# Patient Record
Sex: MALE | URBAN - METROPOLITAN AREA
[De-identification: names, ages, dates, MRNs, and addresses within clinical notes are randomized per-mention and may not be internally consistent; named-entity substitution may affect disease eponyms.]

---

## 2021-06-29 ENCOUNTER — APPOINTMENT (OUTPATIENT)
Dept: URBAN - METROPOLITAN AREA CLINIC 254 | Age: 71
Setting detail: DERMATOLOGY
End: 2021-06-30

## 2021-06-29 VITALS — HEIGHT: 72 IN | WEIGHT: 175 LBS

## 2021-06-29 DIAGNOSIS — D485 NEOPLASM OF UNCERTAIN BEHAVIOR OF SKIN: ICD-10-CM

## 2021-06-29 DIAGNOSIS — R21 RASH AND OTHER NONSPECIFIC SKIN ERUPTION: ICD-10-CM

## 2021-06-29 PROBLEM — D48.5 NEOPLASM OF UNCERTAIN BEHAVIOR OF SKIN: Status: ACTIVE | Noted: 2021-06-29

## 2021-06-29 PROCEDURE — OTHER COUNSELING: OTHER

## 2021-06-29 PROCEDURE — OTHER MEDICATION COUNSELING: OTHER

## 2021-06-29 PROCEDURE — OTHER MIPS QUALITY: OTHER

## 2021-06-29 PROCEDURE — OTHER SEPARATE AND IDENTIFIABLE DOCUMENTATION: OTHER

## 2021-06-29 PROCEDURE — 11104 PUNCH BX SKIN SINGLE LESION: CPT

## 2021-06-29 PROCEDURE — OTHER PATHOLOGY BILLING: OTHER

## 2021-06-29 PROCEDURE — OTHER BIOPSY BY PUNCH METHOD: OTHER

## 2021-06-29 PROCEDURE — OTHER PRESCRIPTION: OTHER

## 2021-06-29 PROCEDURE — 88305 TISSUE EXAM BY PATHOLOGIST: CPT

## 2021-06-29 PROCEDURE — OTHER OTHER (SELF PAY): OTHER

## 2021-06-29 RX ORDER — PREDNISONE 10 MG/1
10MG TABLET ORAL
Qty: 49 | Refills: 0 | Status: ERX | COMMUNITY
Start: 2021-06-29

## 2021-06-29 RX ORDER — MOMETASONE FUROATE 1 MG/G
0.1% OINTMENT TOPICAL BID
Qty: 1 | Refills: 2 | Status: ERX | COMMUNITY
Start: 2021-06-29

## 2021-06-29 ASSESSMENT — LOCATION DETAILED DESCRIPTION DERM
LOCATION DETAILED: LEFT RADIAL PALM
LOCATION DETAILED: RIGHT RADIAL PALM
LOCATION DETAILED: 1ST WEB SPACE LEFT HAND
LOCATION DETAILED: RIGHT LOWER CUTANEOUS LIP

## 2021-06-29 ASSESSMENT — LOCATION SIMPLE DESCRIPTION DERM
LOCATION SIMPLE: RIGHT HAND
LOCATION SIMPLE: RIGHT LIP
LOCATION SIMPLE: LEFT HAND

## 2021-06-29 ASSESSMENT — SEVERITY ASSESSMENT: SEVERITY: MODERATE TO SEVERE

## 2021-06-29 ASSESSMENT — BSA RASH: BSA RASH: 2

## 2021-06-29 ASSESSMENT — PAIN INTENSITY VAS: HOW INTENSE IS YOUR PAIN 0 BEING NO PAIN, 10 BEING THE MOST SEVERE PAIN POSSIBLE?: 1/10 PAIN

## 2021-06-29 ASSESSMENT — LOCATION ZONE DERM
LOCATION ZONE: HAND
LOCATION ZONE: LIP

## 2021-06-29 NOTE — PROCEDURE: PATHOLOGY BILLING
Immunohistochemistry ([id number] and [id number]) billing is not performed here. Please use the Immunohistochemistry Stain Billing plan to accomplish this. Immunohistochemistry ([id number] and [id number]) billing is not performed here. Please use the Immunohistochemistry Stain Billing plan to accomplish this.

## 2021-06-29 NOTE — PROCEDURE: MEDICATION COUNSELING
not examined Tetracycline Counseling: Patient counseled regarding possible photosensitivity and increased risk for sunburn.  Patient instructed to avoid sunlight, if possible.  When exposed to sunlight, patients should wear protective clothing, sunglasses, and sunscreen.  The patient was instructed to call the office immediately if the following severe adverse effects occur:  hearing changes, easy bruising/bleeding, severe headache, or vision changes.  The patient verbalized understanding of the proper use and possible adverse effects of tetracycline.  All of the patient's questions and concerns were addressed. Patient understands to avoid pregnancy while on therapy due to potential birth defects.

## 2021-06-29 NOTE — PROCEDURE: MEDICATION COUNSELING
Xelcherylz Pregnancy And Lactation Text: This medication is Pregnancy Category D and is not considered safe during pregnancy.  The risk during breast feeding is also uncertain.

## 2021-06-29 NOTE — PROCEDURE: MEDICATION COUNSELING
Order placed   Bactrim Pregnancy And Lactation Text: This medication is Pregnancy Category D and is known to cause fetal risk.  It is also excreted in breast milk.

## 2021-06-29 NOTE — PROCEDURE: BIOPSY BY PUNCH METHOD
Hemostasis: None
Consent: Written consent was obtained and risks were reviewed including but not limited to scarring, infection, bleeding, scabbing, incomplete removal, nerve damage and allergy to anesthesia.
Suture Removal: 7 days
X Size Of Lesion In Cm (Optional): 0
Home Suture Removal Text: Patient was provided a home suture removal kit and will remove their sutures at home.  If they have any questions or difficulties they will call the office.
Post-Care Instructions: I reviewed with the patient in detail post-care instructions. Patient is to keep the biopsy site dry overnight, and then apply bacitracin twice daily until healed. Patient may apply hydrogen peroxide soaks to remove any crusting.
Anesthesia Volume In Cc (Will Not Render If 0): 0.5
Dressing: bandage
Epidermal Sutures: 4-0 Ethilon
Validate Triangulation: No
Validate Note Data (See Information Below): Yes
Billing Type: Patient Bill
Anesthesia Type: 2% lidocaine with epinephrine
Information: Selecting Yes will display possible errors in your note based on the variables you have selected. This validation is only offered as a suggestion for you. PLEASE NOTE THAT THE VALIDATION TEXT WILL BE REMOVED WHEN YOU FINALIZE YOUR NOTE. IF YOU WANT TO FAX A PRELIMINARY NOTE YOU WILL NEED TO TOGGLE THIS TO 'NO' IF YOU DO NOT WANT IT IN YOUR FAXED NOTE.
Detail Level: Detailed
Punch Size In Mm: 4
Wound Care: Petrolatum
Biopsy Type: H and E
Notification Instructions: Patient will be notified of biopsy results. However, patient instructed to call the office if not contacted within 2 weeks.

## 2021-06-29 NOTE — PROCEDURE: PATHOLOGY BILLING
Rendering Text In Billing: The slides will be read by [employer] and reported in the attached document Rendering Text In Billing: The slides will be read by [facility] and reported in the attached document

## 2021-07-06 ENCOUNTER — APPOINTMENT (OUTPATIENT)
Dept: URBAN - METROPOLITAN AREA CLINIC 252 | Age: 71
Setting detail: DERMATOLOGY
End: 2021-07-06

## 2021-07-06 DIAGNOSIS — Z48.02 ENCOUNTER FOR REMOVAL OF SUTURES: ICD-10-CM

## 2021-07-06 PROCEDURE — OTHER ADDITIONAL NOTES: OTHER

## 2021-07-06 PROCEDURE — OTHER SUTURE REMOVAL (GLOBAL PERIOD): OTHER

## 2021-07-06 ASSESSMENT — LOCATION ZONE DERM: LOCATION ZONE: HAND

## 2021-07-06 ASSESSMENT — LOCATION DETAILED DESCRIPTION DERM: LOCATION DETAILED: 1ST WEB SPACE LEFT HAND

## 2021-07-06 ASSESSMENT — LOCATION SIMPLE DESCRIPTION DERM: LOCATION SIMPLE: LEFT HAND

## 2021-07-06 NOTE — PROCEDURE: ADDITIONAL NOTES
Additional Notes: Pt worried about bumps forming on hands now from using Mometasone. OE evaluated and advised pt to continue use of Mometasone and that bumps are a normal result of condition. Advised to use Mometasone for one more week with glove and then take break.
Render Risk Assessment In Note?: no
Detail Level: Simple

## 2021-07-06 NOTE — PROCEDURE: SUTURE REMOVAL (GLOBAL PERIOD)
Add 77447 Cpt? (Important Note: In 2017 The Use Of 17048 Is Being Tracked By Cms To Determine Future Global Period Reimbursement For Global Periods): no

## 2021-07-29 ENCOUNTER — APPOINTMENT (OUTPATIENT)
Dept: URBAN - METROPOLITAN AREA CLINIC 254 | Age: 71
Setting detail: DERMATOLOGY
End: 2021-07-29

## 2021-07-29 VITALS — WEIGHT: 180 LBS | HEIGHT: 70 IN

## 2021-07-29 DIAGNOSIS — D485 NEOPLASM OF UNCERTAIN BEHAVIOR OF SKIN: ICD-10-CM

## 2021-07-29 DIAGNOSIS — L40.0 PSORIASIS VULGARIS: ICD-10-CM

## 2021-07-29 PROBLEM — L30.9 DERMATITIS, UNSPECIFIED: Status: ACTIVE | Noted: 2021-07-29

## 2021-07-29 PROBLEM — D48.5 NEOPLASM OF UNCERTAIN BEHAVIOR OF SKIN: Status: ACTIVE | Noted: 2021-07-29

## 2021-07-29 PROCEDURE — OTHER MEDICATION COUNSELING: OTHER

## 2021-07-29 PROCEDURE — OTHER OTHER (SELF PAY): OTHER

## 2021-07-29 PROCEDURE — OTHER PRESCRIPTION: OTHER

## 2021-07-29 PROCEDURE — OTHER MIPS QUALITY: OTHER

## 2021-07-29 PROCEDURE — OTHER COUNSELING: OTHER

## 2021-07-29 PROCEDURE — OTHER BIOPSY BY SHAVE METHOD (SELF-PAY): OTHER

## 2021-07-29 RX ORDER — MOMETASONE FUROATE 1 MG/G
0.1% OINTMENT TOPICAL BID
Qty: 1 | Refills: 6 | Status: ERX | COMMUNITY
Start: 2021-07-29

## 2021-07-29 ASSESSMENT — LOCATION DETAILED DESCRIPTION DERM
LOCATION DETAILED: RIGHT LOWER CUTANEOUS LIP
LOCATION DETAILED: RIGHT RADIAL PALM
LOCATION DETAILED: LEFT RADIAL PALM

## 2021-07-29 ASSESSMENT — LOCATION ZONE DERM
LOCATION ZONE: HAND
LOCATION ZONE: LIP

## 2021-07-29 ASSESSMENT — LOCATION SIMPLE DESCRIPTION DERM
LOCATION SIMPLE: RIGHT LIP
LOCATION SIMPLE: LEFT HAND
LOCATION SIMPLE: RIGHT HAND

## 2021-07-29 NOTE — PROCEDURE: BIOPSY BY SHAVE METHOD (SELF-PAY)
Anesthesia Volume In Cc: 0.5
Render Path Notes In Note?: No
Post-Care Instructions: I reviewed with the patient in detail post-care instructions. Patient is to keep the biopsy site dry overnight, and then apply bacitracin twice daily until healed. Patient may apply hydrogen peroxide soaks to remove any crusting.
Size Of Lesion In Cm (Optional): 0
Detail Level: Detailed
Biopsy Method: Dermablade
Wound Care: Petrolatum
Anesthesia Type: 1% lidocaine with epinephrine
Consent: Written consent was obtained and risks were reviewed including but not limited to scarring, infection, bleeding, scabbing, incomplete removal, nerve damage and allergy to anesthesia.
Biopsy Type: H and E
Billing Type: Client Bill
Hemostasis: Drysol
Notification Instructions: Patient will be notified of biopsy results. However, patient instructed to call the office if not contacted within 2 weeks.

## 2021-07-29 NOTE — PROCEDURE: COUNSELING
Detail Level: Detailed
Patient Specific Counseling (Will Not Stick From Patient To Patient): Patient was advised to take longer breaks between mometasone applications due to skin thinning. Patient expressed understanding.
Detail Level: Zone

## 2021-07-29 NOTE — HPI: SECONDARY COMPLAINT
Additional History: Patient is here for a biopsy of the lesion that was evaluated by OE at the previous visit

## 2021-08-13 NOTE — PROCEDURE: MEDICATION COUNSELING
Clindamycin Pregnancy And Lactation Text: This medication can be used in pregnancy if certain situations. Clindamycin is also present in breast milk. Methotrexate Counseling:  Patient counseled regarding adverse effects of methotrexate including but not limited to nausea, vomiting, abnormalities in liver function tests. Patients may develop mouth sores, rash, diarrhea, and abnormalities in blood counts. The patient understands that monitoring is required including LFT's and blood counts.  There is a rare possibility of scarring of the liver and lung problems that can occur when taking methotrexate. Persistent nausea, loss of appetite, pale stools, dark urine, cough, and shortness of breath should be reported immediately. Patient advised to discontinue methotrexate treatment at least three months before attempting to become pregnant.  I discussed the need for folate supplements while taking methotrexate.  These supplements can decrease side effects during methotrexate treatment. The patient verbalized understanding of the proper use and possible adverse effects of methotrexate.  All of the patient's questions and concerns were addressed.

## 2022-04-05 ENCOUNTER — TRANSFERRED RECORDS (OUTPATIENT)
Dept: HEALTH INFORMATION MANAGEMENT | Facility: HOSPITAL | Age: 72
End: 2022-04-05

## 2022-04-05 ENCOUNTER — HOSPITAL ENCOUNTER (EMERGENCY)
Facility: HOSPITAL | Age: 72
Discharge: ANOTHER HOSPITAL | End: 2022-04-05
Attending: INTERNAL MEDICINE | Admitting: INTERNAL MEDICINE

## 2022-04-05 ENCOUNTER — APPOINTMENT (OUTPATIENT)
Dept: GENERAL RADIOLOGY | Facility: HOSPITAL | Age: 72
End: 2022-04-05
Attending: INTERNAL MEDICINE

## 2022-04-05 VITALS
SYSTOLIC BLOOD PRESSURE: 164 MMHG | TEMPERATURE: 98.1 F | OXYGEN SATURATION: 95 % | HEART RATE: 59 BPM | RESPIRATION RATE: 21 BRPM | WEIGHT: 166 LBS | DIASTOLIC BLOOD PRESSURE: 89 MMHG

## 2022-04-05 DIAGNOSIS — I21.19 ST ELEVATION MYOCARDIAL INFARCTION (STEMI) INVOLVING OTHER CORONARY ARTERY OF INFERIOR WALL (H): ICD-10-CM

## 2022-04-05 LAB
ALBUMIN SERPL-MCNC: 4 G/DL (ref 3.4–5)
ALP SERPL-CCNC: 101 U/L (ref 40–150)
ALT SERPL W P-5'-P-CCNC: 24 U/L (ref 0–70)
ANION GAP SERPL CALCULATED.3IONS-SCNC: 7 MMOL/L (ref 3–14)
AST SERPL W P-5'-P-CCNC: 46 U/L (ref 0–45)
BASOPHILS # BLD AUTO: 0.1 10E3/UL (ref 0–0.2)
BASOPHILS NFR BLD AUTO: 0 %
BILIRUB SERPL-MCNC: 0.7 MG/DL (ref 0.2–1.3)
BUN SERPL-MCNC: 19 MG/DL (ref 7–30)
CALCIUM SERPL-MCNC: 8.8 MG/DL (ref 8.5–10.1)
CHLORIDE BLD-SCNC: 102 MMOL/L (ref 94–109)
CHOLESTEROL (EXTERNAL): 161 MG/DL (ref 114–200)
CO2 SERPL-SCNC: 26 MMOL/L (ref 20–32)
CREAT SERPL-MCNC: 1.08 MG/DL (ref 0.66–1.25)
EOSINOPHIL # BLD AUTO: 0.1 10E3/UL (ref 0–0.7)
EOSINOPHIL NFR BLD AUTO: 1 %
ERYTHROCYTE [DISTWIDTH] IN BLOOD BY AUTOMATED COUNT: 13.5 % (ref 10–15)
GFR SERPL CREATININE-BSD FRML MDRD: 74 ML/MIN/1.73M2
GLUCOSE BLD-MCNC: 263 MG/DL (ref 70–99)
HBA1C MFR BLD: 6.7 % (ref 4–5.6)
HCT VFR BLD AUTO: 46.3 % (ref 40–53)
HDLC SERPL-MCNC: 42 MG/DL (ref 40–60)
HGB BLD-MCNC: 16.1 G/DL (ref 13.3–17.7)
HOLD SPECIMEN: NORMAL
IMM GRANULOCYTES # BLD: 0 10E3/UL
IMM GRANULOCYTES NFR BLD: 0 %
LDL CHOLESTEROL CALCULATED (EXTERNAL): 104 MG/DL
LYMPHOCYTES # BLD AUTO: 1 10E3/UL (ref 0.8–5.3)
LYMPHOCYTES NFR BLD AUTO: 7 %
MCH RBC QN AUTO: 31 PG (ref 26.5–33)
MCHC RBC AUTO-ENTMCNC: 34.8 G/DL (ref 31.5–36.5)
MCV RBC AUTO: 89 FL (ref 78–100)
MONOCYTES # BLD AUTO: 0.3 10E3/UL (ref 0–1.3)
MONOCYTES NFR BLD AUTO: 2 %
NEUTROPHILS # BLD AUTO: 11.5 10E3/UL (ref 1.6–8.3)
NEUTROPHILS NFR BLD AUTO: 90 %
NRBC # BLD AUTO: 0 10E3/UL
NRBC BLD AUTO-RTO: 0 /100
PLATELET # BLD AUTO: 241 10E3/UL (ref 150–450)
POTASSIUM BLD-SCNC: 4.4 MMOL/L (ref 3.4–5.3)
PROT SERPL-MCNC: 7.2 G/DL (ref 6.8–8.8)
RBC # BLD AUTO: 5.2 10E6/UL (ref 4.4–5.9)
SARS-COV-2 RNA RESP QL NAA+PROBE: NEGATIVE
SODIUM SERPL-SCNC: 135 MMOL/L (ref 133–144)
TRIGLYCERIDES (EXTERNAL): 74 MG/DL (ref 10–200)
TROPONIN I SERPL HS-MCNC: 5249 NG/L
WBC # BLD AUTO: 12.9 10E3/UL (ref 4–11)

## 2022-04-05 PROCEDURE — 80053 COMPREHEN METABOLIC PANEL: CPT | Performed by: INTERNAL MEDICINE

## 2022-04-05 PROCEDURE — 93005 ELECTROCARDIOGRAM TRACING: CPT

## 2022-04-05 PROCEDURE — 85025 COMPLETE CBC W/AUTO DIFF WBC: CPT | Performed by: INTERNAL MEDICINE

## 2022-04-05 PROCEDURE — 93010 ELECTROCARDIOGRAM REPORT: CPT | Performed by: INTERNAL MEDICINE

## 2022-04-05 PROCEDURE — 96374 THER/PROPH/DIAG INJ IV PUSH: CPT

## 2022-04-05 PROCEDURE — 84484 ASSAY OF TROPONIN QUANT: CPT | Performed by: INTERNAL MEDICINE

## 2022-04-05 PROCEDURE — 250N000011 HC RX IP 250 OP 636: Performed by: INTERNAL MEDICINE

## 2022-04-05 PROCEDURE — 99285 EMERGENCY DEPT VISIT HI MDM: CPT | Performed by: INTERNAL MEDICINE

## 2022-04-05 PROCEDURE — U0005 INFEC AGEN DETEC AMPLI PROBE: HCPCS | Performed by: INTERNAL MEDICINE

## 2022-04-05 PROCEDURE — 36415 COLL VENOUS BLD VENIPUNCTURE: CPT | Performed by: INTERNAL MEDICINE

## 2022-04-05 PROCEDURE — C9803 HOPD COVID-19 SPEC COLLECT: HCPCS

## 2022-04-05 PROCEDURE — 250N000013 HC RX MED GY IP 250 OP 250 PS 637: Performed by: INTERNAL MEDICINE

## 2022-04-05 PROCEDURE — 71045 X-RAY EXAM CHEST 1 VIEW: CPT

## 2022-04-05 PROCEDURE — 99285 EMERGENCY DEPT VISIT HI MDM: CPT | Mod: 25

## 2022-04-05 PROCEDURE — 250N000013 HC RX MED GY IP 250 OP 250 PS 637

## 2022-04-05 RX ORDER — ATENOLOL 100 MG/1
100 TABLET ORAL DAILY
COMMUNITY
End: 2022-10-17

## 2022-04-05 RX ORDER — METHYLPREDNISOLONE SODIUM SUCCINATE 125 MG/2ML
125 INJECTION, POWDER, LYOPHILIZED, FOR SOLUTION INTRAMUSCULAR; INTRAVENOUS ONCE
Status: DISCONTINUED | OUTPATIENT
Start: 2022-04-05 | End: 2022-04-05

## 2022-04-05 RX ORDER — NITROGLYCERIN 0.4 MG/1
0.4 TABLET SUBLINGUAL ONCE
Status: COMPLETED | OUTPATIENT
Start: 2022-04-05 | End: 2022-04-05

## 2022-04-05 RX ORDER — ASPIRIN 81 MG/1
TABLET, CHEWABLE ORAL
Status: COMPLETED
Start: 2022-04-05 | End: 2022-04-05

## 2022-04-05 RX ORDER — HEPARIN SODIUM 5000 [USP'U]/.5ML
4000 INJECTION, SOLUTION INTRAVENOUS; SUBCUTANEOUS ONCE
Status: COMPLETED | OUTPATIENT
Start: 2022-04-05 | End: 2022-04-05

## 2022-04-05 RX ORDER — ASPIRIN 81 MG/1
324 TABLET, CHEWABLE ORAL ONCE
Status: COMPLETED | OUTPATIENT
Start: 2022-04-05 | End: 2022-04-05

## 2022-04-05 RX ADMIN — ASPIRIN 324 MG: 81 TABLET, CHEWABLE ORAL at 02:50

## 2022-04-05 RX ADMIN — NITROGLYCERIN 0.4 MG: 0.4 TABLET SUBLINGUAL at 03:16

## 2022-04-05 RX ADMIN — HEPARIN SODIUM 4000 UNITS: 5000 INJECTION, SOLUTION INTRAVENOUS; SUBCUTANEOUS at 02:58

## 2022-04-05 RX ADMIN — NITROGLYCERIN 0.4 MG: 0.4 TABLET SUBLINGUAL at 02:52

## 2022-04-05 RX ADMIN — TICAGRELOR 180 MG: 90 TABLET ORAL at 02:55

## 2022-04-05 ASSESSMENT — ENCOUNTER SYMPTOMS
WHEEZING: 0
ABDOMINAL PAIN: 0
DYSURIA: 0
FREQUENCY: 0
LIGHT-HEADEDNESS: 0
BACK PAIN: 0
VOMITING: 0
PALPITATIONS: 0
SLEEP DISTURBANCE: 0
HEADACHES: 0
CHILLS: 0
DIAPHORESIS: 1
DIZZINESS: 0
FLANK PAIN: 0
VOICE CHANGE: 0
SHORTNESS OF BREATH: 0
CONFUSION: 0
NECK PAIN: 0
ANAL BLEEDING: 0
WEAKNESS: 0
ABDOMINAL DISTENTION: 0
FEVER: 0
NUMBNESS: 0
COLOR CHANGE: 0
BLOOD IN STOOL: 0
NAUSEA: 1
MYALGIAS: 0
CHEST TIGHTNESS: 0
COUGH: 0

## 2022-04-05 NOTE — ED PROVIDER NOTES
History     Chief Complaint   Patient presents with     Chest Pain     The history is provided by the patient.   Chest Pain  Pain location:  Substernal area  Pain quality: aching    Pain radiates to:  Mid back  Pain severity:  Moderate  Onset quality:  Gradual  Timing:  Intermittent  Progression:  Worsening  Chronicity:  New  Context: not breathing    Associated symptoms: diaphoresis and nausea    Associated symptoms: no abdominal pain, no back pain, no cough, no dizziness, no fever, no headache, no numbness, no palpitations, no shortness of breath, no vomiting and no weakness        Allergies:  No Known Allergies    Problem List:    There are no problems to display for this patient.       Past Medical History:    No past medical history on file.    Past Surgical History:    No past surgical history on file.    Family History:    No family history on file.    Social History:  Marital Status:   [4]  Social History     Tobacco Use     Smoking status: Not on file     Smokeless tobacco: Not on file   Substance Use Topics     Alcohol use: Not on file     Drug use: Not on file        Medications:    atenolol (TENORMIN) 100 MG tablet  metFORMIN (GLUCOPHAGE) 500 MG tablet          Review of Systems   Constitutional: Positive for diaphoresis. Negative for chills and fever.   HENT: Negative for voice change.    Eyes: Negative for visual disturbance.   Respiratory: Negative for cough, chest tightness, shortness of breath and wheezing.    Cardiovascular: Positive for chest pain. Negative for palpitations and leg swelling.   Gastrointestinal: Positive for nausea. Negative for abdominal distention, abdominal pain, anal bleeding, blood in stool and vomiting.   Genitourinary: Negative for decreased urine volume, dysuria, flank pain and frequency.   Musculoskeletal: Negative for back pain, gait problem, myalgias and neck pain.   Skin: Negative for color change, pallor and rash.   Neurological: Negative for dizziness,  syncope, weakness, light-headedness, numbness and headaches.   Psychiatric/Behavioral: Negative for confusion, sleep disturbance and suicidal ideas.       Physical Exam   BP: (!) 197/121  Pulse: 69  Temp: 98.1  F (36.7  C)  Resp: 14  Weight: 81.6 kg (180 lb)  SpO2: 96 %      Physical Exam  Vitals and nursing note reviewed.   Constitutional:       Appearance: He is well-developed.   HENT:      Head: Normocephalic and atraumatic.   Eyes:      Conjunctiva/sclera: Conjunctivae normal.      Pupils: Pupils are equal, round, and reactive to light.   Neck:      Thyroid: No thyromegaly.      Vascular: No JVD.      Trachea: No tracheal deviation.   Cardiovascular:      Rate and Rhythm: Normal rate and regular rhythm.      Heart sounds: Normal heart sounds. No murmur heard.    No gallop.   Pulmonary:      Effort: Pulmonary effort is normal. No respiratory distress.      Breath sounds: Normal breath sounds. No stridor. No wheezing or rales.   Chest:      Chest wall: No tenderness.   Abdominal:      General: Bowel sounds are normal. There is no distension.      Palpations: Abdomen is soft. There is no mass.      Tenderness: There is no abdominal tenderness. There is no guarding or rebound.   Musculoskeletal:         General: No tenderness. Normal range of motion.      Cervical back: Normal range of motion and neck supple.   Lymphadenopathy:      Cervical: No cervical adenopathy.   Skin:     General: Skin is warm.      Coloration: Skin is not pale.      Findings: No erythema or rash.   Neurological:      Mental Status: He is alert and oriented to person, place, and time.   Psychiatric:         Behavior: Behavior normal.         ED Course                 Procedures                Results for orders placed or performed during the hospital encounter of 04/05/22 (from the past 24 hour(s))   [facility] *Canceled*    Narrative    The following orders were created for panel order [facility].  Procedure                                Abnormality         Status                     ---------                               -----------         ------                       Please view results for these tests on the individual orders.   [facility] Draw    Narrative    The following orders were created for panel order [facility] Draw.  Procedure                               Abnormality         Status                     ---------                               -----------         ------                     Extra Blue Top Tube[[id number]]                              In process                 Extra Red Top Tube[[id number]]                               In process                 Extra Green Top (Lithium...[[id number]]                      In process                 Extra Purple Top Tube[[id number]]                            In process                 Extra Heparinized Syringe[[id number]]                        Final result                 Please view results for these tests on the individual orders.   CBC with platelets differential    Narrative    The following orders were created for panel order CBC with platelets differential.  Procedure                               Abnormality         Status                     ---------                               -----------         ------                     CBC with platelets and d...[[id number]]  Abnormal            Final result                 Please view results for these tests on the individual orders.   Comprehensive metabolic panel   Result Value Ref Range    Sodium 135 133 - 144 mmol/L    Potassium 4.4 3.4 - 5.3 mmol/L    Chloride 102 94 - 109 mmol/L    Carbon Dioxide (CO2) 26 20 - 32 mmol/L    Anion Gap 7 3 - 14 mmol/L    Urea Nitrogen 19 7 - 30 mg/dL    Creatinine 1.08 0.66 - 1.25 mg/dL    Calcium 8.8 8.5 - 10.1 mg/dL    Glucose 263 (H) 70 - 99 mg/dL    Alkaline Phosphatase 101 40 - 150 U/L    AST 46 (H) 0 - 45 U/L    ALT 24 0 - 70 U/L    Protein Total 7.2 6.8 - 8.8 g/dL    Albumin 4.0 3.4 - 5.0 g/dL     Bilirubin Total 0.7 0.2 - 1.3 mg/dL    GFR Estimate 74 >60 mL/min/1.73m2   Troponin I   Result Value Ref Range    Troponin I High Sensitivity 5,249 (HH) <79 ng/L   Extra Heparinized Syringe   Result Value Ref Range    Hold Specimen     CBC with platelets and differential   Result Value Ref Range    WBC Count 12.9 (H) 4.0 - 11.0 10e3/uL    RBC Count 5.20 4.40 - 5.90 10e6/uL    Hemoglobin 16.1 13.3 - 17.7 g/dL    Hematocrit 46.3 40.0 - 53.0 %    MCV 89 78 - 100 fL    MCH 31.0 26.5 - 33.0 pg    MCHC 34.8 31.5 - 36.5 g/dL    RDW 13.5 10.0 - 15.0 %    Platelet Count 241 150 - 450 10e3/uL    % Neutrophils 90 %    % Lymphocytes 7 %    % Monocytes 2 %    % Eosinophils 1 %    % Basophils 0 %    % Immature Granulocytes 0 %    NRBCs per 100 WBC 0 <1 /100    Absolute Neutrophils 11.5 (H) 1.6 - 8.3 10e3/uL    Absolute Lymphocytes 1.0 0.8 - 5.3 10e3/uL    Absolute Monocytes 0.3 0.0 - 1.3 10e3/uL    Absolute Eosinophils 0.1 0.0 - 0.7 10e3/uL    Absolute Basophils 0.1 0.0 - 0.2 10e3/uL    Absolute Immature Granulocytes 0.0 <=0.4 10e3/uL    Absolute NRBCs 0.0 10e3/uL   Asymptomatic COVID-19 Virus (Coronavirus) by PCR Nasopharyngeal    Specimen: Nasopharyngeal; Swab   Result Value Ref Range    SARS CoV2 PCR Negative Negative    Narrative    Testing was performed using the Xpert Xpress SARS-CoV-2 Assay on the   Cepheid Gene-Xpert Instrument Systems. Additional information about   this Emergency Use Authorization (EUA) assay can be found via the Lab   Guide. This test should be ordered for the detection of SARS-CoV-2 in   individuals who meet SARS-CoV-2 clinical and/or epidemiological   criteria. Test performance is unknown in asymptomatic patients. This   test is for in vitro diagnostic use under the FDA EUA for   laboratories certified under CLIA to perform high complexity testing.   This test has not been FDA cleared or approved. A negative result   does not rule out the presence of PCR inhibitors in the specimen or   target RNA in  concentration below the limit of detection for the   assay. The possibility of a false negative should be considered if   the patient's recent exposure or clinical presentation suggests   COVID-19. This test was validated by [facility] laboratory. This laboratory is certified under the Clinical Laboratory Improve  ment Amendments (CLIA) as qualified to perform high complexity testing.       Medications   ticagrelor (BRILINTA) tablet 180 mg (180 mg Oral Given 4/5/22 0255)   heparin ANTICOAGULANT injection 4,000 Units (4,000 Units Intravenous Given 4/5/22 0258)   aspirin (ASA) chewable tablet 324 mg (324 mg Oral Given 4/5/22 0250)   nitroGLYcerin (NITROSTAT) sublingual tablet 0.4 mg (0.4 mg Sublingual Given 4/5/22 0252)   nitroGLYcerin (NITROSTAT) sublingual tablet 0.4 mg (0.4 mg Sublingual Given 4/5/22 0316)       Assessments & Plan (with Medical Decision Making)   Chest pain started since 10 pm off and on and since 12 AM became constant  Hx of HTN, DMm, Denies hx of CAD  EKG : ST elevation in inferior lead  ticgrelor 180, , heprain bolus given, + nitroglycerin   I spoke to Dr Rodriguez in [facility] for transfer.   I have reviewed the nursing notes.    I have reviewed the findings, diagnosis, plan and need for follow up with the patient.      New Prescriptions    No medications on file       Final diagnoses:   ST elevation myocardial infarction (STEMI) involving other coronary artery of inferior wall (H)       4/5/2022   [facility]     Varghese Nicholson MD  04/05/22 5769

## 2022-04-05 NOTE — ED TRIAGE NOTES
Pt reports centralized CP that started around 10pm that radiates through to his back. Pt denies any N/V, diaphoresis but does report SOB but also reports a history of COPD.

## 2022-04-07 LAB
HEP C HIM: NORMAL
HIV 1&2 EXT: NORMAL

## 2022-10-17 ENCOUNTER — HOSPITAL ENCOUNTER (EMERGENCY)
Facility: HOSPITAL | Age: 72
Discharge: HOME | End: 2022-10-18
Attending: EMERGENCY MEDICINE
Payer: MEDICARE

## 2022-10-17 ENCOUNTER — APPOINTMENT (OUTPATIENT)
Dept: GENERAL RADIOLOGY | Facility: HOSPITAL | Age: 72
End: 2022-10-17
Attending: EMERGENCY MEDICINE
Payer: MEDICARE

## 2022-10-17 DIAGNOSIS — I49.3 PVC'S (PREMATURE VENTRICULAR CONTRACTIONS): ICD-10-CM

## 2022-10-17 LAB
ALBUMIN SERPL BCG-MCNC: 4.7 G/DL (ref 3.5–5.2)
ALP SERPL-CCNC: 107 U/L (ref 40–129)
ALT SERPL W P-5'-P-CCNC: 20 U/L (ref 10–50)
ANION GAP SERPL CALCULATED.3IONS-SCNC: 10 MMOL/L (ref 7–15)
APTT PPP: 28 SECONDS (ref 22–38)
AST SERPL W P-5'-P-CCNC: 22 U/L (ref 10–50)
BASE EXCESS BLDV CALC-SCNC: 0 MMOL/L (ref -7.7–1.9)
BASOPHILS # BLD AUTO: 0 10E3/UL (ref 0–0.2)
BASOPHILS NFR BLD AUTO: 1 %
BILIRUB SERPL-MCNC: 0.6 MG/DL
BUN SERPL-MCNC: 14.4 MG/DL (ref 8–23)
CALCIUM SERPL-MCNC: 8.9 MG/DL (ref 8.8–10.2)
CHLORIDE SERPL-SCNC: 105 MMOL/L (ref 98–107)
CREAT SERPL-MCNC: 0.81 MG/DL (ref 0.67–1.17)
DEPRECATED HCO3 PLAS-SCNC: 26 MMOL/L (ref 22–29)
EOSINOPHIL # BLD AUTO: 0.4 10E3/UL (ref 0–0.7)
EOSINOPHIL NFR BLD AUTO: 4 %
ERYTHROCYTE [DISTWIDTH] IN BLOOD BY AUTOMATED COUNT: 14.5 % (ref 10–15)
FLUAV RNA SPEC QL NAA+PROBE: NEGATIVE
FLUBV RNA RESP QL NAA+PROBE: NEGATIVE
GFR SERPL CREATININE-BSD FRML MDRD: >90 ML/MIN/1.73M2
GLUCOSE SERPL-MCNC: 94 MG/DL (ref 70–99)
HCO3 BLDV-SCNC: 26 MMOL/L (ref 21–28)
HCT VFR BLD AUTO: 43.1 % (ref 40–53)
HGB BLD-MCNC: 14.7 G/DL (ref 13.3–17.7)
IMM GRANULOCYTES # BLD: 0 10E3/UL
IMM GRANULOCYTES NFR BLD: 0 %
LACTATE SERPL-SCNC: 1.6 MMOL/L (ref 0.7–2)
LYMPHOCYTES # BLD AUTO: 1.1 10E3/UL (ref 0.8–5.3)
LYMPHOCYTES NFR BLD AUTO: 13 %
MAGNESIUM SERPL-MCNC: 1.7 MG/DL (ref 1.7–2.3)
MCH RBC QN AUTO: 30.9 PG (ref 26.5–33)
MCHC RBC AUTO-ENTMCNC: 34.1 G/DL (ref 31.5–36.5)
MCV RBC AUTO: 91 FL (ref 78–100)
MONOCYTES # BLD AUTO: 0.8 10E3/UL (ref 0–1.3)
MONOCYTES NFR BLD AUTO: 9 %
NEUTROPHILS # BLD AUTO: 6.2 10E3/UL (ref 1.6–8.3)
NEUTROPHILS NFR BLD AUTO: 73 %
NRBC # BLD AUTO: 0 10E3/UL
NRBC BLD AUTO-RTO: 0 /100
NT-PROBNP SERPL-MCNC: 1406 PG/ML (ref 0–900)
O2/TOTAL GAS SETTING VFR VENT: 21 %
OXYHGB MFR BLDV: 95 % (ref 70–75)
PCO2 BLDV: 47 MM HG (ref 40–50)
PH BLDV: 7.36 [PH] (ref 7.32–7.43)
PLATELET # BLD AUTO: 189 10E3/UL (ref 150–450)
PO2 BLDV: 133 MM HG (ref 25–47)
POTASSIUM SERPL-SCNC: 4 MMOL/L (ref 3.4–5.3)
PROT SERPL-MCNC: 6.6 G/DL (ref 6.4–8.3)
RBC # BLD AUTO: 4.75 10E6/UL (ref 4.4–5.9)
RSV RNA SPEC NAA+PROBE: NEGATIVE
SARS-COV-2 RNA RESP QL NAA+PROBE: NEGATIVE
SODIUM SERPL-SCNC: 141 MMOL/L (ref 136–145)
TROPONIN T SERPL HS-MCNC: 24 NG/L
WBC # BLD AUTO: 8.5 10E3/UL (ref 4–11)

## 2022-10-17 PROCEDURE — C9803 HOPD COVID-19 SPEC COLLECT: HCPCS

## 2022-10-17 PROCEDURE — 99284 EMERGENCY DEPT VISIT MOD MDM: CPT | Mod: 25 | Performed by: EMERGENCY MEDICINE

## 2022-10-17 PROCEDURE — 96365 THER/PROPH/DIAG IV INF INIT: CPT | Mod: XU

## 2022-10-17 PROCEDURE — 83880 ASSAY OF NATRIURETIC PEPTIDE: CPT | Performed by: EMERGENCY MEDICINE

## 2022-10-17 PROCEDURE — 71045 X-RAY EXAM CHEST 1 VIEW: CPT

## 2022-10-17 PROCEDURE — 250N000011 HC RX IP 250 OP 636: Performed by: EMERGENCY MEDICINE

## 2022-10-17 PROCEDURE — 87637 SARSCOV2&INF A&B&RSV AMP PRB: CPT | Performed by: EMERGENCY MEDICINE

## 2022-10-17 PROCEDURE — 99285 EMERGENCY DEPT VISIT HI MDM: CPT | Mod: 25

## 2022-10-17 PROCEDURE — 93308 TTE F-UP OR LMTD: CPT | Mod: 26 | Performed by: EMERGENCY MEDICINE

## 2022-10-17 PROCEDURE — 85730 THROMBOPLASTIN TIME PARTIAL: CPT | Performed by: EMERGENCY MEDICINE

## 2022-10-17 PROCEDURE — 85025 COMPLETE CBC W/AUTO DIFF WBC: CPT | Performed by: EMERGENCY MEDICINE

## 2022-10-17 PROCEDURE — 84484 ASSAY OF TROPONIN QUANT: CPT | Performed by: EMERGENCY MEDICINE

## 2022-10-17 PROCEDURE — 80053 COMPREHEN METABOLIC PANEL: CPT | Performed by: EMERGENCY MEDICINE

## 2022-10-17 PROCEDURE — 93308 TTE F-UP OR LMTD: CPT | Mod: TC

## 2022-10-17 PROCEDURE — 83605 ASSAY OF LACTIC ACID: CPT | Performed by: EMERGENCY MEDICINE

## 2022-10-17 PROCEDURE — 82805 BLOOD GASES W/O2 SATURATION: CPT | Performed by: EMERGENCY MEDICINE

## 2022-10-17 PROCEDURE — 83735 ASSAY OF MAGNESIUM: CPT | Performed by: EMERGENCY MEDICINE

## 2022-10-17 PROCEDURE — 36415 COLL VENOUS BLD VENIPUNCTURE: CPT | Performed by: EMERGENCY MEDICINE

## 2022-10-17 PROCEDURE — 93005 ELECTROCARDIOGRAM TRACING: CPT

## 2022-10-17 RX ORDER — ASPIRIN 81 MG/1
81 TABLET, CHEWABLE ORAL DAILY
COMMUNITY
Start: 2022-04-07

## 2022-10-17 RX ORDER — MOMETASONE FUROATE 1 MG/G
OINTMENT TOPICAL
COMMUNITY
Start: 2022-05-12 | End: 2023-01-25

## 2022-10-17 RX ORDER — CLOPIDOGREL BISULFATE 75 MG/1
TABLET ORAL
COMMUNITY
Start: 2022-09-03 | End: 2024-01-12

## 2022-10-17 RX ORDER — NITROGLYCERIN 0.4 MG/1
0.4 TABLET SUBLINGUAL EVERY 5 MIN PRN
COMMUNITY
Start: 2022-04-29

## 2022-10-17 RX ORDER — ATENOLOL 50 MG/1
1 TABLET ORAL DAILY
COMMUNITY
Start: 2022-04-29 | End: 2023-01-25 | Stop reason: ALTCHOICE

## 2022-10-17 RX ORDER — MAGNESIUM SULFATE HEPTAHYDRATE 40 MG/ML
2 INJECTION, SOLUTION INTRAVENOUS ONCE
Status: COMPLETED | OUTPATIENT
Start: 2022-10-17 | End: 2022-10-18

## 2022-10-17 RX ORDER — ATORVASTATIN CALCIUM 20 MG/1
20 TABLET, FILM COATED ORAL DAILY
COMMUNITY
Start: 2022-04-29

## 2022-10-17 RX ADMIN — MAGNESIUM SULFATE IN WATER 2 G: 40 INJECTION, SOLUTION INTRAVENOUS at 23:39

## 2022-10-17 ASSESSMENT — ACTIVITIES OF DAILY LIVING (ADL): ADLS_ACUITY_SCORE: 35

## 2022-10-18 ENCOUNTER — APPOINTMENT (OUTPATIENT)
Dept: ULTRASOUND IMAGING | Facility: HOSPITAL | Age: 72
End: 2022-10-18
Attending: EMERGENCY MEDICINE
Payer: MEDICARE

## 2022-10-18 VITALS
WEIGHT: 154.54 LBS | HEART RATE: 64 BPM | SYSTOLIC BLOOD PRESSURE: 158 MMHG | OXYGEN SATURATION: 95 % | RESPIRATION RATE: 16 BRPM | DIASTOLIC BLOOD PRESSURE: 82 MMHG | TEMPERATURE: 98.8 F

## 2022-10-18 LAB
HOLD SPECIMEN: NORMAL
TROPONIN T SERPL HS-MCNC: 23 NG/L

## 2022-10-18 PROCEDURE — 84484 ASSAY OF TROPONIN QUANT: CPT | Performed by: EMERGENCY MEDICINE

## 2022-10-18 PROCEDURE — 36415 COLL VENOUS BLD VENIPUNCTURE: CPT | Performed by: EMERGENCY MEDICINE

## 2022-10-18 ASSESSMENT — ACTIVITIES OF DAILY LIVING (ADL): ADLS_ACUITY_SCORE: 35

## 2022-10-18 NOTE — ED NOTES
Pt states that he has been short of breath for the last several days. Sats upper 90's. Pt can identify when he has an irregular heartbeat and states that this started approximately a week ago intermittently but now it's more consistent.

## 2022-10-18 NOTE — ED NOTES
Patient provided written and verbal discharge instructions and patient verbalizes understanding. Patient provided copy of EKG per Dr. Lobo for patient to bring to cardiology follow up. Patient left department ambulatory.

## 2022-10-18 NOTE — DISCHARGE INSTRUCTIONS
You presented to the emergency department with a sensation of abnormal heartbeats.  Your EKG showed PVCs.  Your electrolytes were fine however your magnesium was 1.7, not technically low but low enough to warrant IV magnesium.  You received 2 g.  After that, your sensation of PVCs decreased and we noticed less on the monitor.  Please discuss this with your cardiologist.

## 2022-10-18 NOTE — ED TRIAGE NOTES
Pt states around 2000 tonight he felt his heart beating different and started having SOB. Denies any recent illness.

## 2022-10-20 ASSESSMENT — ENCOUNTER SYMPTOMS
SHORTNESS OF BREATH: 1
CHILLS: 0
FEVER: 0

## 2022-10-20 NOTE — ED PROVIDER NOTES
History     Chief Complaint   Patient presents with     Shortness of Breath     Irregular Heart Beat     HPI  Jamie Chu is a 72 year old male who is here with abnormal heart rhythm.  Every once while he feels a very forceful beat.  No history of similar in the past.  No new medications.  No recreational drugs.  No chest pain.  Does have some shortness of breath with it but only when he feels a forceful beat.  No pain or swelling of lower extremities.    Allergies:  No Known Allergies    Problem List:    There are no problems to display for this patient.       Past Medical History:    No past medical history on file.    Past Surgical History:    No past surgical history on file.    Family History:    No family history on file.    Social History:  Marital Status:   [4]        Medications:    aspirin (ASA) 81 MG chewable tablet  atenolol (TENORMIN) 50 MG tablet  atorvastatin (LIPITOR) 80 MG tablet  clopidogrel (PLAVIX) 75 MG tablet  metFORMIN (GLUCOPHAGE) 500 MG tablet  mometasone (ELOCON) 0.1 % external ointment  nitroGLYcerin (NITROSTAT) 0.4 MG sublingual tablet          Review of Systems   Constitutional: Negative for chills and fever.   Respiratory: Positive for shortness of breath.    Cardiovascular: Negative for chest pain.   All other systems reviewed and are negative.      Physical Exam   BP: (!) 209/99  Pulse: 82  Temp: 98.8  F (37.1  C)  Resp: 16  Weight: 70.1 kg (154 lb 8.7 oz)  SpO2: 96 %      Physical Exam  Constitutional:       General: He is not in acute distress.     Appearance: Normal appearance.   HENT:      Head: Normocephalic and atraumatic.      Right Ear: External ear normal.      Left Ear: External ear normal.      Nose: Nose normal. No rhinorrhea.   Eyes:      Conjunctiva/sclera: Conjunctivae normal.   Cardiovascular:      Rate and Rhythm: Normal rate and regular rhythm.      Pulses: Normal pulses.   Pulmonary:      Effort: Pulmonary effort is normal. No respiratory distress.       Breath sounds: Normal breath sounds.   Abdominal:      General: There is no distension.      Palpations: Abdomen is soft.      Tenderness: There is no abdominal tenderness.   Musculoskeletal:         General: No deformity or signs of injury.   Skin:     General: Skin is warm and dry.      Capillary Refill: Capillary refill takes less than 2 seconds.   Neurological:      General: No focal deficit present.      Mental Status: He is alert. Mental status is at baseline.   Psychiatric:         Mood and Affect: Mood normal.         Behavior: Behavior normal.         ED Course                 Procedures    Results for orders placed during the hospital encounter of 10/17/22    POC US ECHO LIMITED    [facility] Procedure Note    Limited Bedside ED Cardiac Ultrasound:    PROCEDURE: PERFORMED BY: Dr. Cirilo Lobo MD  INDICATIONS/SYMPTOM:  Chest Pain  PROBE: Cardiac phased array probe  BODY LOCATION: Chest  FINDINGS:  The ultrasound was performed utilizing the subcostal and apical 4 chamber views.  Cardiac contractility:  Present  Gross estimation of cardiac kinesis: normal  Pericardial Effusion:  None  RV:LV ratio: LV > RV  INTERPRETATION:    Chamber size and motion were grossly normal with LV > RV, normal cardiac kinesis.  No pericardial effusion was found.  IVC visualized and findings indicate normovolemia.  IMAGE DOCUMENTATION: Images were archived to PACs system.            EKG Interpretation:      Interpreted by Cirilo Lobo MD  Time reviewed:   Symptoms at time of EKG: palpitations   Rhythm: normal sinus   Rate: Normal  Axis: Normal  Ectopy: premature ventricular contractions (infrequent)  Conduction: normal  ST Segments/ T Waves: No acute ischemic changes  Q Waves: none  Comparison to prior:     Clinical Impression: Normal ecg w/ some PVC's                Critical Care time:               No results found for this or any previous visit (from the past 24 hour(s)).    Medications   magnesium  sulfate 2 g in water intermittent infusion (0 g Intravenous Stopped 10/18/22 0032)       Assessments & Plan (with Medical Decision Making)     I have reviewed the nursing notes.    I have reviewed the findings, diagnosis, plan and need for follow up with the patient.  72-year-old male here with frequent PVCs.  Check electrolytes.  Magnesium low end of normal.  Repleted.  PVCs became much less frequent.  Okay to follow-up with cardiology.    Discharge Medication List as of 10/18/2022  2:08 AM          Final diagnoses:   PVC's (premature ventricular contractions)       10/17/2022   [facility]     Cirilo Lobo MD  10/21/22 8619

## 2023-01-16 ENCOUNTER — APPOINTMENT (OUTPATIENT)
Dept: CT IMAGING | Facility: HOSPITAL | Age: 73
DRG: 193 | End: 2023-01-16
Attending: NURSE PRACTITIONER
Payer: COMMERCIAL

## 2023-01-16 ENCOUNTER — HOSPITAL ENCOUNTER (INPATIENT)
Facility: HOSPITAL | Age: 73
LOS: 4 days | Discharge: HOME | DRG: 193 | End: 2023-01-20
Attending: NURSE PRACTITIONER | Admitting: INTERNAL MEDICINE
Payer: COMMERCIAL

## 2023-01-16 ENCOUNTER — APPOINTMENT (OUTPATIENT)
Dept: GENERAL RADIOLOGY | Facility: HOSPITAL | Age: 73
DRG: 193 | End: 2023-01-16
Attending: NURSE PRACTITIONER
Payer: COMMERCIAL

## 2023-01-16 DIAGNOSIS — J18.9 PNEUMONIA OF LEFT LOWER LOBE DUE TO INFECTIOUS ORGANISM: ICD-10-CM

## 2023-01-16 DIAGNOSIS — J96.01 ACUTE RESPIRATORY FAILURE WITH HYPOXIA (H): ICD-10-CM

## 2023-01-16 DIAGNOSIS — J44.9 CHRONIC OBSTRUCTIVE PULMONARY DISEASE, UNSPECIFIED COPD TYPE (H): Primary | ICD-10-CM

## 2023-01-16 PROBLEM — I25.10 CORONARY ARTERY DISEASE INVOLVING NATIVE CORONARY ARTERY OF NATIVE HEART WITHOUT ANGINA PECTORIS: Status: ACTIVE | Noted: 2022-11-07

## 2023-01-16 PROBLEM — I21.9 MYOCARDIAL INFARCTION (H): Status: ACTIVE | Noted: 2022-04-05

## 2023-01-16 PROBLEM — E78.5 DYSLIPIDEMIA: Status: ACTIVE | Noted: 2022-11-07

## 2023-01-16 PROBLEM — I10 ESSENTIAL (PRIMARY) HYPERTENSION: Status: ACTIVE | Noted: 2022-04-05

## 2023-01-16 PROBLEM — E11.9 TYPE 2 DIABETES MELLITUS WITHOUT COMPLICATION, WITHOUT LONG-TERM CURRENT USE OF INSULIN (H): Status: ACTIVE | Noted: 2022-04-05

## 2023-01-16 PROBLEM — I50.32 CHRONIC DIASTOLIC CONGESTIVE HEART FAILURE (H): Status: ACTIVE | Noted: 2022-11-07

## 2023-01-16 PROBLEM — F17.200 TOBACCO USE DISORDER: Status: ACTIVE | Noted: 2022-04-05

## 2023-01-16 LAB
ALBUMIN SERPL BCG-MCNC: 4.2 G/DL (ref 3.5–5.2)
ALP SERPL-CCNC: 95 U/L (ref 40–129)
ALT SERPL W P-5'-P-CCNC: 30 U/L (ref 10–50)
ANION GAP SERPL CALCULATED.3IONS-SCNC: 14 MMOL/L (ref 7–15)
AST SERPL W P-5'-P-CCNC: 44 U/L (ref 10–50)
BASOPHILS # BLD AUTO: 0 10E3/UL (ref 0–0.2)
BASOPHILS NFR BLD AUTO: 0 %
BILIRUB SERPL-MCNC: 0.8 MG/DL
BUN SERPL-MCNC: 27.1 MG/DL (ref 8–23)
CALCIUM SERPL-MCNC: 8.7 MG/DL (ref 8.8–10.2)
CHLORIDE SERPL-SCNC: 103 MMOL/L (ref 98–107)
CREAT SERPL-MCNC: 0.86 MG/DL (ref 0.67–1.17)
D DIMER PPP FEU-MCNC: 0.9 UG/ML FEU (ref 0–0.5)
DEPRECATED HCO3 PLAS-SCNC: 23 MMOL/L (ref 22–29)
EOSINOPHIL # BLD AUTO: 0 10E3/UL (ref 0–0.7)
EOSINOPHIL NFR BLD AUTO: 0 %
ERYTHROCYTE [DISTWIDTH] IN BLOOD BY AUTOMATED COUNT: 14.1 % (ref 10–15)
FLUAV RNA SPEC QL NAA+PROBE: NEGATIVE
FLUBV RNA RESP QL NAA+PROBE: NEGATIVE
GFR SERPL CREATININE-BSD FRML MDRD: >90 ML/MIN/1.73M2
GLUCOSE BLDC GLUCOMTR-MCNC: 120 MG/DL (ref 70–99)
GLUCOSE SERPL-MCNC: 149 MG/DL (ref 70–99)
HCT VFR BLD AUTO: 41.9 % (ref 40–53)
HGB BLD-MCNC: 14.6 G/DL (ref 13.3–17.7)
HOLD SPECIMEN: NORMAL
IMM GRANULOCYTES # BLD: 0 10E3/UL
IMM GRANULOCYTES NFR BLD: 0 %
LACTATE SERPL-SCNC: 1.8 MMOL/L (ref 0.7–2)
LYMPHOCYTES # BLD AUTO: 0.6 10E3/UL (ref 0.8–5.3)
LYMPHOCYTES NFR BLD AUTO: 7 %
MAGNESIUM SERPL-MCNC: 1.6 MG/DL (ref 1.7–2.3)
MCH RBC QN AUTO: 31.4 PG (ref 26.5–33)
MCHC RBC AUTO-ENTMCNC: 34.8 G/DL (ref 31.5–36.5)
MCV RBC AUTO: 90 FL (ref 78–100)
MONOCYTES # BLD AUTO: 1.2 10E3/UL (ref 0–1.3)
MONOCYTES NFR BLD AUTO: 12 %
NEUTROPHILS # BLD AUTO: 7.7 10E3/UL (ref 1.6–8.3)
NEUTROPHILS NFR BLD AUTO: 81 %
NRBC # BLD AUTO: 0 10E3/UL
NRBC BLD AUTO-RTO: 0 /100
PLATELET # BLD AUTO: 135 10E3/UL (ref 150–450)
POTASSIUM SERPL-SCNC: 4.5 MMOL/L (ref 3.4–5.3)
PROT SERPL-MCNC: 6.5 G/DL (ref 6.4–8.3)
RBC # BLD AUTO: 4.65 10E6/UL (ref 4.4–5.9)
RSV RNA SPEC NAA+PROBE: NEGATIVE
SARS-COV-2 RNA RESP QL NAA+PROBE: NEGATIVE
SODIUM SERPL-SCNC: 140 MMOL/L (ref 136–145)
TROPONIN T SERPL HS-MCNC: 17 NG/L
WBC # BLD AUTO: 9.5 10E3/UL (ref 4–11)

## 2023-01-16 PROCEDURE — 99222 1ST HOSP IP/OBS MODERATE 55: CPT | Mod: AI | Performed by: INTERNAL MEDICINE

## 2023-01-16 PROCEDURE — C9803 HOPD COVID-19 SPEC COLLECT: HCPCS

## 2023-01-16 PROCEDURE — 258N000003 HC RX IP 258 OP 636: Performed by: NURSE PRACTITIONER

## 2023-01-16 PROCEDURE — 85025 COMPLETE CBC W/AUTO DIFF WBC: CPT | Performed by: NURSE PRACTITIONER

## 2023-01-16 PROCEDURE — 250N000009 HC RX 250: Performed by: NURSE PRACTITIONER

## 2023-01-16 PROCEDURE — 84484 ASSAY OF TROPONIN QUANT: CPT | Performed by: NURSE PRACTITIONER

## 2023-01-16 PROCEDURE — 83735 ASSAY OF MAGNESIUM: CPT | Performed by: NURSE PRACTITIONER

## 2023-01-16 PROCEDURE — 71275 CT ANGIOGRAPHY CHEST: CPT | Mod: MG

## 2023-01-16 PROCEDURE — 99285 EMERGENCY DEPT VISIT HI MDM: CPT | Mod: CS | Performed by: NURSE PRACTITIONER

## 2023-01-16 PROCEDURE — 87040 BLOOD CULTURE FOR BACTERIA: CPT | Performed by: NURSE PRACTITIONER

## 2023-01-16 PROCEDURE — 80053 COMPREHEN METABOLIC PANEL: CPT | Performed by: NURSE PRACTITIONER

## 2023-01-16 PROCEDURE — 94640 AIRWAY INHALATION TREATMENT: CPT | Mod: 76

## 2023-01-16 PROCEDURE — 87637 SARSCOV2&INF A&B&RSV AMP PRB: CPT | Performed by: NURSE PRACTITIONER

## 2023-01-16 PROCEDURE — 93010 ELECTROCARDIOGRAM REPORT: CPT | Performed by: INTERNAL MEDICINE

## 2023-01-16 PROCEDURE — 93005 ELECTROCARDIOGRAM TRACING: CPT

## 2023-01-16 PROCEDURE — 250N000009 HC RX 250: Performed by: INTERNAL MEDICINE

## 2023-01-16 PROCEDURE — 96365 THER/PROPH/DIAG IV INF INIT: CPT

## 2023-01-16 PROCEDURE — 250N000011 HC RX IP 250 OP 636: Performed by: RADIOLOGY

## 2023-01-16 PROCEDURE — 83605 ASSAY OF LACTIC ACID: CPT | Performed by: NURSE PRACTITIONER

## 2023-01-16 PROCEDURE — 250N000011 HC RX IP 250 OP 636: Performed by: INTERNAL MEDICINE

## 2023-01-16 PROCEDURE — 120N000001 HC R&B MED SURG/OB

## 2023-01-16 PROCEDURE — 85379 FIBRIN DEGRADATION QUANT: CPT | Performed by: NURSE PRACTITIONER

## 2023-01-16 PROCEDURE — 99285 EMERGENCY DEPT VISIT HI MDM: CPT | Mod: 25,CS

## 2023-01-16 PROCEDURE — 36415 COLL VENOUS BLD VENIPUNCTURE: CPT | Performed by: NURSE PRACTITIONER

## 2023-01-16 PROCEDURE — 94640 AIRWAY INHALATION TREATMENT: CPT

## 2023-01-16 PROCEDURE — 258N000003 HC RX IP 258 OP 636: Performed by: INTERNAL MEDICINE

## 2023-01-16 PROCEDURE — 250N000011 HC RX IP 250 OP 636: Performed by: NURSE PRACTITIONER

## 2023-01-16 PROCEDURE — 71045 X-RAY EXAM CHEST 1 VIEW: CPT

## 2023-01-16 PROCEDURE — 999N000157 HC STATISTIC RCP TIME EA 10 MIN

## 2023-01-16 PROCEDURE — 250N000013 HC RX MED GY IP 250 OP 250 PS 637: Performed by: INTERNAL MEDICINE

## 2023-01-16 RX ORDER — ONDANSETRON 2 MG/ML
4 INJECTION INTRAMUSCULAR; INTRAVENOUS EVERY 6 HOURS PRN
Status: DISCONTINUED | OUTPATIENT
Start: 2023-01-16 | End: 2023-01-20 | Stop reason: HOSPADM

## 2023-01-16 RX ORDER — CEFTRIAXONE SODIUM 1 G/50ML
1 INJECTION, SOLUTION INTRAVENOUS EVERY 24 HOURS
Status: DISCONTINUED | OUTPATIENT
Start: 2023-01-17 | End: 2023-01-20 | Stop reason: HOSPADM

## 2023-01-16 RX ORDER — AZITHROMYCIN 500 MG/5ML
500 INJECTION, POWDER, LYOPHILIZED, FOR SOLUTION INTRAVENOUS ONCE
Status: COMPLETED | OUTPATIENT
Start: 2023-01-16 | End: 2023-01-16

## 2023-01-16 RX ORDER — ATORVASTATIN CALCIUM 40 MG/1
80 TABLET, FILM COATED ORAL EVERY EVENING
Status: DISCONTINUED | OUTPATIENT
Start: 2023-01-16 | End: 2023-01-20 | Stop reason: HOSPADM

## 2023-01-16 RX ORDER — DOXYCYCLINE 100 MG/1
100 CAPSULE ORAL EVERY 12 HOURS SCHEDULED
Status: DISCONTINUED | OUTPATIENT
Start: 2023-01-16 | End: 2023-01-20 | Stop reason: HOSPADM

## 2023-01-16 RX ORDER — DEXTROSE MONOHYDRATE 25 G/50ML
25-50 INJECTION, SOLUTION INTRAVENOUS
Status: DISCONTINUED | OUTPATIENT
Start: 2023-01-16 | End: 2023-01-20 | Stop reason: HOSPADM

## 2023-01-16 RX ORDER — NICOTINE POLACRILEX 4 MG
15-30 LOZENGE BUCCAL
Status: DISCONTINUED | OUTPATIENT
Start: 2023-01-16 | End: 2023-01-20 | Stop reason: HOSPADM

## 2023-01-16 RX ORDER — IOPAMIDOL 755 MG/ML
55 INJECTION, SOLUTION INTRAVASCULAR ONCE
Status: COMPLETED | OUTPATIENT
Start: 2023-01-16 | End: 2023-01-16

## 2023-01-16 RX ORDER — ALBUTEROL SULFATE 0.83 MG/ML
2.5 SOLUTION RESPIRATORY (INHALATION) EVERY 4 HOURS PRN
Status: DISCONTINUED | OUTPATIENT
Start: 2023-01-16 | End: 2023-01-20 | Stop reason: HOSPADM

## 2023-01-16 RX ORDER — ACETAMINOPHEN 650 MG/1
650 SUPPOSITORY RECTAL EVERY 6 HOURS PRN
Status: DISCONTINUED | OUTPATIENT
Start: 2023-01-16 | End: 2023-01-20 | Stop reason: HOSPADM

## 2023-01-16 RX ORDER — CLOPIDOGREL BISULFATE 75 MG/1
75 TABLET ORAL DAILY
Status: DISCONTINUED | OUTPATIENT
Start: 2023-01-16 | End: 2023-01-20 | Stop reason: HOSPADM

## 2023-01-16 RX ORDER — ENOXAPARIN SODIUM 100 MG/ML
40 INJECTION SUBCUTANEOUS EVERY 24 HOURS
Status: DISCONTINUED | OUTPATIENT
Start: 2023-01-16 | End: 2023-01-20 | Stop reason: HOSPADM

## 2023-01-16 RX ORDER — CEFTRIAXONE SODIUM 1 G/50ML
1 INJECTION, SOLUTION INTRAVENOUS ONCE
Status: COMPLETED | OUTPATIENT
Start: 2023-01-16 | End: 2023-01-16

## 2023-01-16 RX ORDER — LIDOCAINE 40 MG/G
CREAM TOPICAL
Status: DISCONTINUED | OUTPATIENT
Start: 2023-01-16 | End: 2023-01-20 | Stop reason: HOSPADM

## 2023-01-16 RX ORDER — ASPIRIN 81 MG/1
81 TABLET, CHEWABLE ORAL DAILY
Status: DISCONTINUED | OUTPATIENT
Start: 2023-01-17 | End: 2023-01-20 | Stop reason: HOSPADM

## 2023-01-16 RX ORDER — ATENOLOL 50 MG/1
50 TABLET ORAL DAILY
Status: DISCONTINUED | OUTPATIENT
Start: 2023-01-17 | End: 2023-01-19

## 2023-01-16 RX ORDER — SODIUM CHLORIDE 9 MG/ML
INJECTION, SOLUTION INTRAVENOUS CONTINUOUS
Status: ACTIVE | OUTPATIENT
Start: 2023-01-16 | End: 2023-01-17

## 2023-01-16 RX ORDER — IPRATROPIUM BROMIDE AND ALBUTEROL SULFATE 2.5; .5 MG/3ML; MG/3ML
3 SOLUTION RESPIRATORY (INHALATION)
Status: DISCONTINUED | OUTPATIENT
Start: 2023-01-16 | End: 2023-01-20 | Stop reason: HOSPADM

## 2023-01-16 RX ORDER — ALBUTEROL SULFATE 0.83 MG/ML
5 SOLUTION RESPIRATORY (INHALATION)
Status: DISCONTINUED | OUTPATIENT
Start: 2023-01-16 | End: 2023-01-16

## 2023-01-16 RX ORDER — ACETAMINOPHEN 325 MG/1
650 TABLET ORAL EVERY 6 HOURS PRN
Status: DISCONTINUED | OUTPATIENT
Start: 2023-01-16 | End: 2023-01-20 | Stop reason: HOSPADM

## 2023-01-16 RX ORDER — ONDANSETRON 4 MG/1
4 TABLET, ORALLY DISINTEGRATING ORAL EVERY 6 HOURS PRN
Status: DISCONTINUED | OUTPATIENT
Start: 2023-01-16 | End: 2023-01-20 | Stop reason: HOSPADM

## 2023-01-16 RX ADMIN — SODIUM CHLORIDE, PRESERVATIVE FREE: 5 INJECTION INTRAVENOUS at 20:35

## 2023-01-16 RX ADMIN — CEFTRIAXONE SODIUM 1 G: 1 INJECTION, SOLUTION INTRAVENOUS at 18:10

## 2023-01-16 RX ADMIN — AZITHROMYCIN 500 MG: 500 INJECTION, POWDER, LYOPHILIZED, FOR SOLUTION INTRAVENOUS at 18:54

## 2023-01-16 RX ADMIN — ALBUTEROL SULFATE 5 MG: 2.5 SOLUTION RESPIRATORY (INHALATION) at 15:38

## 2023-01-16 RX ADMIN — ENOXAPARIN SODIUM 40 MG: 40 INJECTION SUBCUTANEOUS at 20:54

## 2023-01-16 RX ADMIN — METFORMIN HYDROCHLORIDE 500 MG: 500 TABLET, FILM COATED ORAL at 21:22

## 2023-01-16 RX ADMIN — IOPAMIDOL 55 ML: 755 INJECTION, SOLUTION INTRAVENOUS at 16:37

## 2023-01-16 RX ADMIN — IPRATROPIUM BROMIDE AND ALBUTEROL SULFATE 3 ML: 2.5; .5 SOLUTION RESPIRATORY (INHALATION) at 20:40

## 2023-01-16 RX ADMIN — DOXYCYCLINE HYCLATE 100 MG: 100 CAPSULE ORAL at 20:49

## 2023-01-16 RX ADMIN — ATORVASTATIN CALCIUM 80 MG: 40 TABLET, FILM COATED ORAL at 20:49

## 2023-01-16 RX ADMIN — CLOPIDOGREL BISULFATE 75 MG: 75 TABLET ORAL at 20:49

## 2023-01-16 ASSESSMENT — ACTIVITIES OF DAILY LIVING (ADL)
ADLS_ACUITY_SCORE: 22
ADLS_ACUITY_SCORE: 35
ADLS_ACUITY_SCORE: 35
ADLS_ACUITY_SCORE: 22

## 2023-01-16 NOTE — ED TRIAGE NOTES
Pt presents with chest congestion and cough for past 3-4 days. Pt has SOB and dizziness. No fevers, no NVD. Pt sent here from [facility]. Pt denies pain.

## 2023-01-16 NOTE — ED PROVIDER NOTES
EMERGENCY MEDICINE NOTE - IRENE GAN, CNP    ID:   Jamie Chu    CC:  Chief Complaint   Patient presents with     Shortness of Breath        MEANS OF ARRIVAL:  ambulance    PCP:   No Ref-Primary, Physician    SUBJECTIVE:   HPI:   Jamie Chu is a 72 year old individual with history of chronic diastolic congestive heart failure, COPD, hypertension, MI with stent, DMT2, comes in today for shortness of breath for the past 3 days.   Patient states he has had shortness of breath and cough.  No fever, chills, chest pain, dizziness, lightheadedness, lower extremity swelling reported.  Only complaints of shortness of breath and cough.    Review of Systems:  Significant positives/negatives were reviewed and mentioned in HPI.  All remaining body systems are negative or non-contributory.    I have reviewed the PMH, Meds, Allergies, and SH, including:  ALLERGIES:  No Known Allergies    CURRENT MEDICATIONS:  Current Outpatient Rx   Medication Sig Dispense Refill     aspirin (ASA) 81 MG chewable tablet 81 mg       atenolol (TENORMIN) 50 MG tablet Take 1 tablet by mouth daily       atorvastatin (LIPITOR) 80 MG tablet Take 80 mg by mouth       clopidogrel (PLAVIX) 75 MG tablet TAKE FOUR TABLETS BY MOUTH FOR THE FIRST DAY, THEN 1 TABLET DAILY THEREAFTER       metFORMIN (GLUCOPHAGE) 500 MG tablet Take by mouth 2 times daily (with meals)       mometasone (ELOCON) 0.1 % external ointment APPLY TOPICALLY TO BOTH HANDS TWICE DAILY. USE FOR NO LONGER THAN 14 DAYS PER MONTH.       nitroGLYcerin (NITROSTAT) 0.4 MG sublingual tablet Place 0.4 mg under the tongue          PMH:  There is no problem list on file for this patient.    No past surgical history on file.       OBJECTIVE:     Vitals:    01/16/23 1730 01/16/23 1731 01/16/23 1741 01/16/23 1746   BP: 148/77 148/77  156/71   Pulse: 66 68 68 70   Resp:  24 24 24   Temp:       TempSrc:       SpO2:  95% (!) 89% 94%   Weight:       Height:         Body mass index is 22.38  kg/m .  GENERAL APPEARANCE:  The patient is a 72 year old well-developed, well-nourished individual who does have labored breathing upon arrival.  NECK:  Supple.  Trachea is midline.  No carotid bruits present on auscultation.  LUNGS: Breathing is labored.  Breath sounds are equal bilaterally.  Does rhonchi throughout the lung fields and congested cough.  HEART:  Regular rate and rhythm with normal S1 and S2.  No murmurs, gallops, or rubs.  ABDOMEN:  No CVA tenderness or flank mass.  No abdominal bruits or thrills present upon auscultation/palpation.  EXTREMITIES:  No cyanosis, clubbing, or edema.    NEUROLOGIC:  No focal sensory or motor deficits are noted.    PSYCHIATRIC:  The patient is awake, alert, and oriented x4.  Recent and remote memory is intact.  Appropriate mood and affect.  Calm and cooperative with history and physical exam.  SKIN:  Warm, dry, and well perfused.  Good turgor.  No lesions, nodules, or rashes are noted.  No bruising noted.      Comment: Discrepancies between my note and notes on behalf of the nursing team or other care providers are secondary to my findings reflecting my physical examination and questioning of the patient.  Any conflicting information provided is not in line with my examination of the patient.    ECG:    ECG competed at 1555 and personally reviewed at 1559 showing sinus rhythm with ventricular rate in the 60's.  Normal axis.  T wave abnormalities in inferior leads present.  When compared to ECG from 10/17/2022, sinus rhythm with PVCs versus PACs with aberrancy is now replaced by sinus rhythm with T wave abnormality in  inferior leads.    LAB:     Recent Results (from the past 24 hour(s))   Symptomatic Influenza A/B & SARS-CoV2 (COVID-19) Virus PCR Multiplex Nasopharyngeal    Collection Time: 01/16/23  3:13 PM    Specimen: Nasopharyngeal; Swab   Result Value Ref Range    Influenza A PCR Negative Negative    Influenza B PCR Negative Negative    RSV PCR Negative Negative     SARS CoV2 PCR Negative Negative   D dimer quantitative    Collection Time: 01/16/23  3:24 PM   Result Value Ref Range    D-Dimer Quantitative 0.90 (H) 0.00 - 0.50 ug/mL FEU   Comprehensive metabolic panel    Collection Time: 01/16/23  3:24 PM   Result Value Ref Range    Sodium 140 136 - 145 mmol/L    Potassium 4.5 3.4 - 5.3 mmol/L    Chloride 103 98 - 107 mmol/L    Carbon Dioxide (CO2) 23 22 - 29 mmol/L    Anion Gap 14 7 - 15 mmol/L    Urea Nitrogen 27.1 (H) 8.0 - 23.0 mg/dL    Creatinine 0.86 0.67 - 1.17 mg/dL    Calcium 8.7 (L) 8.8 - 10.2 mg/dL    Glucose 149 (H) 70 - 99 mg/dL    Alkaline Phosphatase 95 40 - 129 U/L    AST 44 10 - 50 U/L    ALT 30 10 - 50 U/L    Protein Total 6.5 6.4 - 8.3 g/dL    Albumin 4.2 3.5 - 5.2 g/dL    Bilirubin Total 0.8 <=1.2 mg/dL    GFR Estimate >90 >60 mL/min/1.73m2   Troponin T, High Sensitivity    Collection Time: 01/16/23  3:24 PM   Result Value Ref Range    Troponin T, High Sensitivity 17 <=22 ng/L   Magnesium    Collection Time: 01/16/23  3:24 PM   Result Value Ref Range    Magnesium 1.6 (L) 1.7 - 2.3 mg/dL   Lactic acid whole blood    Collection Time: 01/16/23  3:24 PM   Result Value Ref Range    Lactic Acid 1.8 0.7 - 2.0 mmol/L   CBC with platelets and differential    Collection Time: 01/16/23  3:24 PM   Result Value Ref Range    WBC Count 9.5 4.0 - 11.0 10e3/uL    RBC Count 4.65 4.40 - 5.90 10e6/uL    Hemoglobin 14.6 13.3 - 17.7 g/dL    Hematocrit 41.9 40.0 - 53.0 %    MCV 90 78 - 100 fL    MCH 31.4 26.5 - 33.0 pg    MCHC 34.8 31.5 - 36.5 g/dL    RDW 14.1 10.0 - 15.0 %    Platelet Count 135 (L) 150 - 450 10e3/uL    % Neutrophils 81 %    % Lymphocytes 7 %    % Monocytes 12 %    % Eosinophils 0 %    % Basophils 0 %    % Immature Granulocytes 0 %    NRBCs per 100 WBC 0 <1 /100    Absolute Neutrophils 7.7 1.6 - 8.3 10e3/uL    Absolute Lymphocytes 0.6 (L) 0.8 - 5.3 10e3/uL    Absolute Monocytes 1.2 0.0 - 1.3 10e3/uL    Absolute Eosinophils 0.0 0.0 - 0.7 10e3/uL    Absolute  Basophils 0.0 0.0 - 0.2 10e3/uL    Absolute Immature Granulocytes 0.0 <=0.4 10e3/uL    Absolute NRBCs 0.0 10e3/uL       IMAGING STUDIES:     Results for orders placed or performed during the hospital encounter of 01/16/23   XR Chest Port 1 View    Narrative    Procedure:XR CHEST PORT 1 VIEW    Clinical history:Male, 72 years, Shortness of breath    Technique: Single view was obtained.    Comparison: 10/17/2022    Findings: The cardiac silhouette is normal. The pulmonary vasculature  is normal.    The lungs are clear. Bony structures again demonstrate postoperative  changes of the right clavicle.      Impression    Impression:   No acute abnormality. No evidence of acute or active cardiopulmonary  disease.    MANOHAR BENNETT MD         SYSTEM ID:  [id number]   CTA Chest with Contrast    Narrative    CTA CHEST WITH CONTRAST  1/16/2023 5:00 PM    CLINICAL HISTORY: Male, age 72 years,  Shortness of breath;    Comparison:  Chest x-ray 1/16/2023    TECHNIQUE:  CT was performed of the chest  with IV contrast.   Axial; sagittal and coronal MIP images were reviewed..     FINDINGS:  Chest CT:    CTA chest: Good quality CTA demonstrates no evidence of pulmonary  embolus. The thoracic aorta is intact. The heart and great vessels  demonstrate no acute abnormality. Coronary artery calcifications are  present, as are calcifications within the aortic valve.    Lungs demonstrate patchy areas of atelectasis and moderate emphysema  with a predominance in the upper lobes. Subtle groundglass  opacification suggested within the periphery of the lingula and left  lower lobe.    There is no evidence of pathologic lymph node enlargement.    Thyroid gland and esophagus are grossly normal.    Visualized portions of the upper abdomen demonstrate no acute  abnormality.    Bony structures: No acute abnormality. Postoperative changes of the  right clavicle. Mild degenerative changes of the thoracic spine.         Impression    IMPRESSION:   Good  quality CTA demonstrates no evidence of acute vascular  abnormality.    Although limited by respiratory motion, subtle pneumonia is suggested  in the lingula and left lower lobe.    Moderate emphysema.    This facility minimizes radiation dose by adjusting the mA and/or kV  according to each patient size.      This CT scan was performed using one or more the following dose  reduction techniques:    -Automated exposure control,  -Adjustment of the mA and/or kV according to patient's size, and/or,  -Use of iterative reconstruction technique.    MANOHAR BENNETT MD         SYSTEM ID:  [id number]         ED COURSE/ MDM/ ASSESSMENT/ PLAN:   Diagnostic Testing:  Diagnostic testing was conducted.  Show WBC of 9.5 with hemoglobin of 14.6.  Electrolytes, renal, and hepatic functions benign.  Troponin negative.  Negative influenza, COVID, RSV.  Does have a D-dimer of 0.90.  The Plain Film X-rays have been read by the radiologist and personally reviewed with the interpretation of no acute cardiopulmonary abnormalities.    The CT has been read by the radiologist with the interpretation of no acute vascular abnormality.  Does have subtle pneumonia in the lingula and left lower lobe.  Moderate emphysema noted.    Assessment:   Patient presents to the ER today hypoxia and shortness of breath.  Upon arrival, vitals signs show blood pressure 188/86 with a pulse 74.  Temperature 36.2  C.  Respirations 24 with oxygenation of 85% on room air.  Examination was completed.  The patient is alert and oriented but is having labored breathing.  Cardiac examination benign.  Does have rhonchi throughout the lobes with congested cough.    Potential diagnosis which have been considered and evaluated include COVID, influenza, RSV, pneumonia, pneumothorax, MI/NSTEMI, CHF, PE, as well as others. Many of these have been excluded using the various modalities and assessment as noted on the chart. At the present time, the diagnosis given seems to be the  most likely pneumonia which is causing hypoxia.    ED Course/MDM/Plan:   Patient comes in with shortness of breath for the past 3 days.  Was sent over from [facility] due to low oxygenation.  Upon arrival patient is hypoxic with oxygenation of 85%.  Patient is dyspneic.  Evaluation does show rhonchi throughout the lung fields with congested cough.  Patient placed on O2 at 4 L with improvement of oxygenation to 94%.  Patient did feel better with this.  No reported fever, chills, chest pain.  No increased weight gain reported.  ECG obtained showing no acute abnormality.  Troponin negative.  No leukocytosis present.  Lactic acid negative.  COVID, influenza, RSV are also negative.  D-dimer was mildly elevated at 0.90.  CT angio of the chest was conducted which is negative for PE but does show left lower lobe and lingular consolidations consistent with pneumonia.  Patient given ceftriaxone 1 g IV in addition to azithromycin 500 mg IV in the ER.  Patient unable to get off O2.  On room air after nebulizers, patient's desatted to the 80's.  Patient continued on O2 at this reason.    Due to hypoxia and pneumonia, discussed case with Hospitalist Dr. Norman who graciously excepted patient for admit to [facility].      DIAGNOSIS:   (J18.9) Pneumonia of left lower lobe due to infectious organism  (J96.01) Acute respiratory failure with hypoxia (H)        Critical Care Time: None    Impression and plan discussed with patient. Questions answered, concerns addressed, indications for urgent re-evaluation reviewed, and  given. Patient/Parent/Caregiver agree with treatment plan and have no further questions at this time.       This note was created by the Dragon Voice Dictation System. Inadvertent typographical errors, due to software recognition problems, may still exist.      Wilton GAN, CNP  [facility]  EMERGENCY DEPARTMENT  [address] [address]  [phone]        Wilton Platt, APRN CNP  01/16/23 1822

## 2023-01-17 LAB
ANION GAP SERPL CALCULATED.3IONS-SCNC: 9 MMOL/L (ref 7–15)
BUN SERPL-MCNC: 26.1 MG/DL (ref 8–23)
CALCIUM SERPL-MCNC: 8.4 MG/DL (ref 8.8–10.2)
CHLORIDE SERPL-SCNC: 105 MMOL/L (ref 98–107)
CREAT SERPL-MCNC: 0.87 MG/DL (ref 0.67–1.17)
DEPRECATED HCO3 PLAS-SCNC: 25 MMOL/L (ref 22–29)
ERYTHROCYTE [DISTWIDTH] IN BLOOD BY AUTOMATED COUNT: 14.1 % (ref 10–15)
GFR SERPL CREATININE-BSD FRML MDRD: >90 ML/MIN/1.73M2
GLUCOSE BLDC GLUCOMTR-MCNC: 112 MG/DL (ref 70–99)
GLUCOSE BLDC GLUCOMTR-MCNC: 134 MG/DL (ref 70–99)
GLUCOSE BLDC GLUCOMTR-MCNC: 147 MG/DL (ref 70–99)
GLUCOSE BLDC GLUCOMTR-MCNC: 164 MG/DL (ref 70–99)
GLUCOSE BLDC GLUCOMTR-MCNC: 51 MG/DL (ref 70–99)
GLUCOSE BLDC GLUCOMTR-MCNC: 52 MG/DL (ref 70–99)
GLUCOSE BLDC GLUCOMTR-MCNC: 55 MG/DL (ref 70–99)
GLUCOSE BLDC GLUCOMTR-MCNC: 95 MG/DL (ref 70–99)
GLUCOSE BLDC GLUCOMTR-MCNC: 95 MG/DL (ref 70–99)
GLUCOSE SERPL-MCNC: 121 MG/DL (ref 70–99)
HCT VFR BLD AUTO: 37.3 % (ref 40–53)
HGB BLD-MCNC: 13 G/DL (ref 13.3–17.7)
MAGNESIUM SERPL-MCNC: 1.6 MG/DL (ref 1.7–2.3)
MCH RBC QN AUTO: 31.5 PG (ref 26.5–33)
MCHC RBC AUTO-ENTMCNC: 34.9 G/DL (ref 31.5–36.5)
MCV RBC AUTO: 90 FL (ref 78–100)
PLATELET # BLD AUTO: 127 10E3/UL (ref 150–450)
POTASSIUM SERPL-SCNC: 4 MMOL/L (ref 3.4–5.3)
RBC # BLD AUTO: 4.13 10E6/UL (ref 4.4–5.9)
SODIUM SERPL-SCNC: 139 MMOL/L (ref 136–145)
WBC # BLD AUTO: 7.7 10E3/UL (ref 4–11)

## 2023-01-17 PROCEDURE — 94640 AIRWAY INHALATION TREATMENT: CPT | Mod: 76

## 2023-01-17 PROCEDURE — 258N000003 HC RX IP 258 OP 636: Performed by: INTERNAL MEDICINE

## 2023-01-17 PROCEDURE — 85027 COMPLETE CBC AUTOMATED: CPT | Performed by: INTERNAL MEDICINE

## 2023-01-17 PROCEDURE — 36415 COLL VENOUS BLD VENIPUNCTURE: CPT | Performed by: INTERNAL MEDICINE

## 2023-01-17 PROCEDURE — 999N000157 HC STATISTIC RCP TIME EA 10 MIN

## 2023-01-17 PROCEDURE — 250N000011 HC RX IP 250 OP 636: Performed by: INTERNAL MEDICINE

## 2023-01-17 PROCEDURE — 250N000009 HC RX 250: Performed by: INTERNAL MEDICINE

## 2023-01-17 PROCEDURE — 120N000001 HC R&B MED SURG/OB

## 2023-01-17 PROCEDURE — 99232 SBSQ HOSP IP/OBS MODERATE 35: CPT | Performed by: INTERNAL MEDICINE

## 2023-01-17 PROCEDURE — 83735 ASSAY OF MAGNESIUM: CPT | Performed by: INTERNAL MEDICINE

## 2023-01-17 PROCEDURE — 80048 BASIC METABOLIC PNL TOTAL CA: CPT | Performed by: INTERNAL MEDICINE

## 2023-01-17 PROCEDURE — 250N000013 HC RX MED GY IP 250 OP 250 PS 637: Performed by: INTERNAL MEDICINE

## 2023-01-17 RX ADMIN — DOXYCYCLINE HYCLATE 100 MG: 100 CAPSULE ORAL at 20:04

## 2023-01-17 RX ADMIN — SODIUM CHLORIDE, PRESERVATIVE FREE: 5 INJECTION INTRAVENOUS at 05:56

## 2023-01-17 RX ADMIN — ENOXAPARIN SODIUM 40 MG: 40 INJECTION SUBCUTANEOUS at 20:04

## 2023-01-17 RX ADMIN — DEXTROSE 15 G: 15 GEL ORAL at 01:57

## 2023-01-17 RX ADMIN — IPRATROPIUM BROMIDE AND ALBUTEROL SULFATE 3 ML: 2.5; .5 SOLUTION RESPIRATORY (INHALATION) at 03:34

## 2023-01-17 RX ADMIN — DOXYCYCLINE HYCLATE 100 MG: 100 CAPSULE ORAL at 08:11

## 2023-01-17 RX ADMIN — IPRATROPIUM BROMIDE AND ALBUTEROL SULFATE 3 ML: 2.5; .5 SOLUTION RESPIRATORY (INHALATION) at 20:05

## 2023-01-17 RX ADMIN — METFORMIN HYDROCHLORIDE 500 MG: 500 TABLET, FILM COATED ORAL at 08:11

## 2023-01-17 RX ADMIN — ASPIRIN 81 MG 81 MG: 81 TABLET ORAL at 08:13

## 2023-01-17 RX ADMIN — ATORVASTATIN CALCIUM 80 MG: 40 TABLET, FILM COATED ORAL at 20:04

## 2023-01-17 RX ADMIN — IPRATROPIUM BROMIDE AND ALBUTEROL SULFATE 3 ML: 2.5; .5 SOLUTION RESPIRATORY (INHALATION) at 12:31

## 2023-01-17 RX ADMIN — CEFTRIAXONE SODIUM 1 G: 1 INJECTION, SOLUTION INTRAVENOUS at 17:19

## 2023-01-17 RX ADMIN — ATENOLOL 50 MG: 50 TABLET ORAL at 08:10

## 2023-01-17 RX ADMIN — CLOPIDOGREL BISULFATE 75 MG: 75 TABLET ORAL at 08:10

## 2023-01-17 RX ADMIN — METFORMIN HYDROCHLORIDE 500 MG: 500 TABLET, FILM COATED ORAL at 17:17

## 2023-01-17 ASSESSMENT — ACTIVITIES OF DAILY LIVING (ADL)
ADLS_ACUITY_SCORE: 22

## 2023-01-17 NOTE — H&P
[facility]    History and Physical - Hospitalist Service       Date of Admission:  1/16/2023    Assessment & Plan      Jmaie Chu is a 72 year old male admitted on 1/16/2023. He presents with dyspnea and cough x3 days. Admitted for CAP treatment.     Hospital problems  1.  Community-acquired pneumonia  -Rocephin and doxycycline  -DuoNeb scheduled and albuterol as needed    2.  Hypoxic respiratory failure  -Due to pneumonia  -Oxygen supplementation through nasal cannula.    3.  COPD  -Possible COPD exacerbation also.  -Hold steroids for now but continue nebulizers and antibiotics.    4.  Diabetes type 2  -Hold metformin and sliding scale  -Diabetic diet    5.  Hypertension  -Presents to emergency room not controlled  -Monitor and continue atenolol which is his home medication.  -If he needs second medication I will add ACE inhibitor    6.  Coronary artery disease status post stenting  -Remote no acute ischemic episodes  -Only monitoring required  -Continue aspirin and Plavix.  He was also on atenolol.    7.  Hypomagnesemia  -Present on admission  -Replace and monitor    8. Severe psoriasis  - local treatment will be continued.     Diet:  Combination diet including amount of carbs low-cholesterol and low-sodium  DVT Prophylaxis: Enoxaparin (Lovenox) SQ  Patel Catheter: Not present  Lines: None     Cardiac Monitoring: None  Code Status:  Full code    Clinically Significant Risk Factors Present on Admission            # Hypomagnesemia: Lowest Mg = 1.6 mg/dL in last 2 days, will replace as needed     # Drug Induced Platelet Defect: home medication list includes an antiplatelet medication     # Acute Respiratory Failure: Documented O2 saturation < 91%.  Continue supplemental oxygen as needed             Disposition Plan Home in stable     Expected Discharge Date: 01/17/2023                  Susanna Norman MD  Hospitalist Service  [facility]  Securely message with [employer] (more info)  Text  page via [facility] Paging/Directory     ______________________________________________________________________    Chief Complaint   Cough and shortness of breath for 3 days    History is obtained from the patient, electronic health record and emergency department physician    History of Present Illness   Jamie Chu is a 72 year old male who has past medical history significant for heart failure preserved ejection fraction, COPD, hypertension, coronary artery disease status post MI and PCI, type 2 diabetes comes to emergency room with shortness of breath for the last 3 days.  Patient states that he has been short of breath and having cough.  Denies fever chills chest pain dizziness lightheadedness hemoptysis or extremity swelling.  Denies the rest.  12 points of review of system obtained.  He presents to emergency room hypoxic and saturating 86% on room air.  ED work-up revealed magnesium 1.6 and platelets 135.  The rest of blood work unremarkable.  Chest x-ray showed mild emphysema but no acute changes.  When D-dimer checked and came back elevated CTA chest was ordered.  Radiology report states that there is emphysema, left lower lobe and lingular infiltrates.  Patient currently stable.  Requires 3 L oxygen to saturate 94%.  He will be admitted for continued treatment for his community-acquired pneumonia.  Rocephin and doxycycline will be used.      Past Medical History    As per history of present illness  Past Surgical History   As per history of present illness  Prior to Admission Medications   Prior to Admission Medications   Prescriptions Last Dose Informant Patient Reported? Taking?   aspirin (ASA) 81 MG chewable tablet   Yes No   Si mg   atenolol (TENORMIN) 50 MG tablet   Yes No   Sig: Take 1 tablet by mouth daily   atorvastatin (LIPITOR) 80 MG tablet   Yes No   Sig: Take 80 mg by mouth   clopidogrel (PLAVIX) 75 MG tablet   Yes No   Sig: TAKE FOUR TABLETS BY MOUTH FOR THE FIRST DAY, THEN 1 TABLET DAILY  THEREAFTER   metFORMIN (GLUCOPHAGE) 500 MG tablet   Yes No   Sig: Take by mouth 2 times daily (with meals)   mometasone (ELOCON) 0.1 % external ointment   Yes No   Sig: APPLY TOPICALLY TO BOTH HANDS TWICE DAILY. USE FOR NO LONGER THAN 14 DAYS PER MONTH.   nitroGLYcerin (NITROSTAT) 0.4 MG sublingual tablet   Yes No   Sig: Place 0.4 mg under the tongue      Facility-Administered Medications: None        Review of Systems    The 10 point Review of Systems is negative other than noted in the HP.  Physical Exam   Vital Signs: Temp: 97.1  F (36.2  C) Temp src: Tympanic BP: 151/80 Pulse: 73   Resp: 16 SpO2: 96 % O2 Device: Nasal cannula Oxygen Delivery: 3 LPM  Weight: 165 lbs 0 oz    General Appearance: He is not in distress  Respiratory: Normal respiratory effort.  Prolonged expiration left lower lobe.  Bronchial breath sounds.  No wheezing.  Cardiovascular: Regular rhythm and rate no murmurs gallops or rubs  GI: Abdomen nondistended nontender no hepatosplenomegaly  Skin: Warm, well perfused. Severe hand psoriasis (see picture of the left hand)  Musculoskeletal: No sarcopenia, no major joint effusion.  Lymphatic/hematologic: No petechia, no ecchymoses, no regional lymphadenopathy.    Medical Decision Making       55 MINUTES SPENT BY ME on the date of service doing chart review, history, exam, documentation & further activities per the note.      Data     I have personally reviewed the following data over the past 24 hrs:    9.5  \   14.6   / 135 (L)     140 103 27.1 (H) /  149 (H)   4.5 23 0.86 \       ALT: 30 AST: 44 AP: 95 TBILI: 0.8   ALB: 4.2 TOT PROTEIN: 6.5 LIPASE: N/A       Trop: 17 BNP: N/A       Procal: N/A CRP: N/A Lactic Acid: 1.8       INR:  N/A PTT:  N/A   D-dimer:  0.90 (H) Fibrinogen:  N/A       Imaging results reviewed over the past 24 hrs:   Recent Results (from the past 24 hour(s))   XR Chest Port 1 View    Narrative    Procedure:XR CHEST PORT 1 VIEW    Clinical history:Male, 72 years, Shortness of  breath    Technique: Single view was obtained.    Comparison: 10/17/2022    Findings: The cardiac silhouette is normal. The pulmonary vasculature  is normal.    The lungs are clear. Bony structures again demonstrate postoperative  changes of the right clavicle.      Impression    Impression:   No acute abnormality. No evidence of acute or active cardiopulmonary  disease.    MANOHAR BENNETT MD         SYSTEM ID:  [id number]   CTA Chest with Contrast    Narrative    CTA CHEST WITH CONTRAST  1/16/2023 5:00 PM    CLINICAL HISTORY: Male, age 72 years,  Shortness of breath;    Comparison:  Chest x-ray 1/16/2023    TECHNIQUE:  CT was performed of the chest  with IV contrast.   Axial; sagittal and coronal MIP images were reviewed..     FINDINGS:  Chest CT:    CTA chest: Good quality CTA demonstrates no evidence of pulmonary  embolus. The thoracic aorta is intact. The heart and great vessels  demonstrate no acute abnormality. Coronary artery calcifications are  present, as are calcifications within the aortic valve.    Lungs demonstrate patchy areas of atelectasis and moderate emphysema  with a predominance in the upper lobes. Subtle groundglass  opacification suggested within the periphery of the lingula and left  lower lobe.    There is no evidence of pathologic lymph node enlargement.    Thyroid gland and esophagus are grossly normal.    Visualized portions of the upper abdomen demonstrate no acute  abnormality.    Bony structures: No acute abnormality. Postoperative changes of the  right clavicle. Mild degenerative changes of the thoracic spine.         Impression    IMPRESSION:   Good quality CTA demonstrates no evidence of acute vascular  abnormality.    Although limited by respiratory motion, subtle pneumonia is suggested  in the lingula and left lower lobe.    Moderate emphysema.    This facility minimizes radiation dose by adjusting the mA and/or kV  according to each patient size.      This CT scan was performed  using one or more the following dose  reduction techniques:    -Automated exposure control,  -Adjustment of the mA and/or kV according to patient's size, and/or,  -Use of iterative reconstruction technique.    MANOHAR BENNETT MD         SYSTEM ID:  [id number]

## 2023-01-17 NOTE — DISCHARGE INSTRUCTIONS
Establish Care Appointment    Feb 13, 2023  3:20 PM   (Arrive by 3:05 PM)   New Patient with Jaime Hebert MD   [facility] ([facility] ) [address] [place] [address]   [phone]       Instruct patient to bring all current medications for their  initial appointment.

## 2023-01-17 NOTE — PLAN OF CARE
Most recent vitals: /73 (BP Location: Right arm, Patient Position: Sitting, Cuff Size: Adult Regular)   Pulse 73   Temp 97.2  F (36.2  C) (Tympanic)   Resp 20   Ht 1.829 m (6')   Wt 69.9 kg (154 lb)   SpO2 95%   BMI 20.89 kg/m       Pt is A&O, very pleasant, vss, denying pain. Assessments are as charted. BG levels upon admission were 120 at 0200 check pt BG level was found to be 52. 15 G of glucose gel given and pt was rechecked in 15-20 minutes. BG at this time was 51. MD was contacted and orders were given to advance diet to consistent carb with low NA and fat. Pt was provided a box lunch from pantry with orange juice. BG upon recheck was 55. Pt was provided two more juices, which brought him up to 95. Pt was encouraged to finish second juice and on final check was up to 112. Will continue to monitor levels. Magnesium replacement RN managed. No replacement called for at this time, morning lab draw ordered. Sats are mid 90's on 2.5 LPM. IV has NS running at 100 mL/hr. Tele has shown frequent PVC's and sinus bradycardia per ICU nurses charted report. Sliding scale insulin ordered however pt is controlled at home on metformin and declined insulin during his stay. MD aware and ordered home dose Metformin. IV rocephin and PO doxycycline ordered. Pt has been moving around well independently in room. Bed is locked and low, call light within reach, calls appropriately.     Face to face report given with opportunity to observe patient.    Report given to SAM Dave RN   1/17/2023  7:01 AM

## 2023-01-17 NOTE — PROVIDER NOTIFICATION
Pt 0200 BG check was 52. 15 G glucose gel given and pt rechecked 15-20 mins later. Pt BG was 51. Md contacted and gave permission to update diet orders to consistent carb, low NA, low fat diet.

## 2023-01-17 NOTE — PROGRESS NOTES
Assessment completed with Jose R, johny Spence was present.    LOC: alert     Dx: PNA  Chronic Disease Management: COPD, DM2, HTN, CAD, severe psoriasis    Lives with: johny Spence lives with Jose R  Living at:  Home in [place]  Transportation: YES, drives self    Primary PCP: No Ref-Primary, Physician, establish care set up with Dr Hebert  Insurance:  [employer] Medicare  Medicare IMM letter reviewed with Jose R.    Support System:  family  Homecare/PCA: no  /County Services:   no  : NO      How was the [facility] notification completed: n/a    Health Care Directive: no, provided info.  Guardian: no  POA: no    Pharmacy: [facility]  Meds management: manages own    Adequate Resources for needs (housing, utilities, food/med): YES  Household chores: shared  Work/community/social activity: YES, gets out as desired    ADLs: independent  Ambulation:independent  Falls: no  Nutrition: no concern  Sleep: sleeps wonderfully    Equipment used: grab bars      Oxygen supplier: no      Does the supplier have valid oxygen orders: n/a    Mental health: denies  Substance abuse: denies  Exposure to violence/abuse: denies  Stressors: denies    Able to Return to Prior Living Arrangements: YES    Choice of Vendor: n/a    Barriers: no primary care, has establish care appt with Dr Malik BERMUDEZ: low    Plan: home with johny lott    Sammie Ferrara CM

## 2023-01-17 NOTE — PROVIDER NOTIFICATION
Clarified diet order with MD. Doctor stated that he wanted pt to be clear liquids throughout night to see how well pt tolerates then we can advance to consistent carb, low Na, low fat.

## 2023-01-17 NOTE — PLAN OF CARE
[facility] Inpatient Admission Note:    Patient admitted to 3226/3226-1 at approximately 2015 via wheel chair from emergency room . Report received from SAM Chamorro in SBAR format at 1935 via telephone. Patient ambulated to bed via self.. Patient is alert and oriented X 3, denies pain; rates at 0 on 0-10 scale.  Patient oriented to room, unit, hourly rounding, and plan of care. Explained admission packet and patient handbook with patient bill of rights brochure. Will continue to monitor and document as needed.     Inpatient Nursing criteria listed below was met:    Health care directives status obtained and documented: Yes    Patient identifies a surrogate decision maker: No If yes, who: NA Contact Information: NA     If initial lactic acid greater than 2.0, repeat lactic acid drawn within one hour of arrival to unit: NA. If no, state reason: NA    Clergy visit ordered if patient requests: N/A    Skin issues/needs documented: Yes    Isolation Patient: no Education given, correct sign in place and documentation row added to PCS:  NA    Fall Prevention Yes: Care plan updated, education given and documented, sticker and magnet in place: Yes    Care Plan initiated: Yes    Education Documented (including assessment): Yes    Patient has discharge needs : Yes If yes, please explain: TBD

## 2023-01-17 NOTE — PHARMACY
Pharmacy Antimicrobial Stewardship Assessment     Current Antimicrobial Therapy:  Anti-infectives (From now, onward)    Start     Dose/Rate Route Frequency Ordered Stop    23 1800  cefTRIAXone in d5w (ROCEPHIN) intermittent infusion 1 g         1 g  over 30 Minutes Intravenous EVERY 24 HOURS 23  doxycycline hyclate (VIBRAMYCIN) capsule 100 mg         100 mg Oral EVERY 12 HOURS SCHEDULED 23            Indication: CAP    Days of Therapy: 2     Pertinent Labs:    Recent labs: (last 7 days)     23  1524 23  0524   WBC 9.5 7.7   LACT 1.8  --        Temperature:  Temp (24hrs), Av.8  F (36.6  C), Min:97.1  F (36.2  C), Max:98.5  F (36.9  C)      Culture Results:       Recommendations/Interventions:  1. None at this time.    Mora Galaviz, [facility]  2023

## 2023-01-17 NOTE — PROGRESS NOTES
[facility]    Medicine Progress Note - Hospitalist Service    Date of Admission:  1/16/2023    History of Present Illness:   Jamie Chu is a 72 year old male who presented to the ED on 01/16/23 for shortness of breath and cough x 3 days. He was sent over from [facility] for low oxygenation. He has a past medical history significant for COPD, heart failure with preserved ejection fraction, CAD status post MI and PCI, type 2 diabetes, hypertension. He had no fevers, chills, chest pain, dizziness, hemoptysis, or peripheral edema. In the ER vitals were 188/66, 74 HR, temperature 36.2 C, respirations 24, oxygenation 85% on room air. EKG showed no signs of acute ischemia. Troponin was negative, respiratory viral panel was negative. D-dimer was elevated at 0.9 so a CTA of chest was completed showing emphysema and left lower lobe and lingular consolidation, suggesting pneumonia. He was given ceftriaxone 1g IV and azithromycin 500mg IV in ER. Patient continued to have low oxygenation so patient was admitted for treatment of pneumonia and hypoxia.     When admitted the patient had rocephin IV continued and doxycycline PO for the pneumonia. He has scheduled DuoNebs and albuterol as needed. Overnight he required 4 LPM of oxygen. This morning have been able to get him to mid-90s on 1.5 LPM. He continues to cough but shortness of breath has improved. No current chest pain or palpitations. He continues to have an appetite but is fatigued.     He had tele monitoring due to extensive cardiac history of MI and PCI but has had no concerning findings so will be discontinued. His blood glucose was low overnight but corrected and has been stable since and will continue to be monitored.        Assessment & Plan    1. Community-acquired pneumonia: He remains on rocephin and doxycycline for pneumonia. Has been continuing scheduled DuoNebs and albuterol as needed. Will get a sputum culture. No significant  fevers. WBCs have remained in range and are trending down.    2. Hypoxic respiratory failure: Likely due to pneumonia and possible COPD exacerbation. Overnight titrated up to 4 LPM. Today he has been on 1.5 LPM via nasal cannula and stats have remained in 90s.      3. COPD: COPD exacerbation could be contributing to symptoms. Continuing antibiotics and nebulizers. Will continue to monitor and reassess possible need for steroids tomorrow.     4. Diabetes type 2: Had multiple low BGs overnight, diet was switched to regular diet this morning. Taking home dose of Metformin. Will continue to monitor.     5. Hypertension: Most recent blood pressure 158/74. Taking atenolol, will continue to monitor.     6. Coronary artery disease status post stenting: No chest pain or signs of ischemic episode. No concerning findings on tele monitoring. Monitoring will be discontinued today. Will continue aspirin and Plavix.     7. Hypomagnesemia: Hypomagnesemia was present on admission. 1.6 this morning. Monitored by nursing and replaced as needed.      8. Psoriasis: Continue home treatment.     Diet: Combination Diet Regular Diet    DVT Prophylaxis: Enoxaparin (Lovenox) SQ  Patel Catheter: Not present  Lines: None     Cardiac Monitoring: ACTIVE order. Indication: hypoxia, cad  Code Status: Full Code      Clinically Significant Risk Factors Present on Admission          # Hypocalcemia: Lowest Ca = 8.4 mg/dL in last 2 days, will monitor and replace as appropriate   # Hypomagnesemia: Lowest Mg = 1.6 mg/dL in last 2 days, will replace as needed     # Drug Induced Platelet Defect: home medication list includes an antiplatelet medication     # Acute Respiratory Failure: Documented O2 saturation < 91%.  Continue supplemental oxygen as needed             Disposition Plan   Home when stable.     Margarette SHERIDAN  Hospitalist Service  [facility]  Securely message with [employer] (more info)  Text page via [employer] Paging/Directory    ______________________________________________________________________    Interval History   Patient alert and pleasant today. He has been feeling fatigued and run down. Still having coughing and some wheezing. BG levels were low overnight but corrected. Switched him to regular diet. Tele showed no concerning findings overnight, will discontinue today. He is going to continue to rest and will remain inpatient until he feels better.     Physical Exam   Vital Signs: Temp: 98.5  F (36.9  C) Temp src: Tympanic BP: 158/74 Pulse: 77   Resp: 20 SpO2: 93 % O2 Device: Nasal cannula Oxygen Delivery: 1.5 LPM  Weight: 154 lbs 0 oz    Constitutional: awake, alert, cooperative, no apparent distress.  Respiratory: normal respiratory effort. Crackles and wheezing diffuse.  Cardiovascular: regular rate and rhythm, normal S1 and S2, no S3 or S4, no murmurs, rubs or gallops  GI:  normal bowel sounds, soft, non-distended, non-tender, no masses palpated, no hepatosplenomegaly  Skin: Warm, well perfused. Left hand psoriasis.  Hematologic: No petechia, no ecchymosis, no regional lymphadenopathy.      Data   ------------------------- PAST 24 HR DATA REVIEWED -----------------------------------------------    I have personally reviewed the following data over the past 24 hrs:    7.7  \   13.0 (L)   / 127 (L)     139 105 26.1 (H) /  134 (H)   4.0 25 0.87 \       ALT: 30 AST: 44 AP: 95 TBILI: 0.8   ALB: 4.2 TOT PROTEIN: 6.5 LIPASE: N/A       Trop: 17 BNP: N/A       Procal: N/A CRP: N/A Lactic Acid: 1.8       INR:  N/A PTT:  N/A   D-dimer:  0.90 (H) Fibrinogen:  N/A       Imaging results reviewed over the past 24 hrs:   Recent Results (from the past 24 hour(s))   XR Chest Port 1 View    Narrative    Procedure:XR CHEST PORT 1 VIEW    Clinical history:Male, 72 years, Shortness of breath    Technique: Single view was obtained.    Comparison: 10/17/2022    Findings: The cardiac silhouette is normal. The pulmonary vasculature  is normal.    The  lungs are clear. Bony structures again demonstrate postoperative  changes of the right clavicle.      Impression    Impression:   No acute abnormality. No evidence of acute or active cardiopulmonary  disease.    MANOHAR BENNETT MD         SYSTEM ID:  [id number]   CTA Chest with Contrast    Narrative    CTA CHEST WITH CONTRAST  1/16/2023 5:00 PM    CLINICAL HISTORY: Male, age 72 years,  Shortness of breath;    Comparison:  Chest x-ray 1/16/2023    TECHNIQUE:  CT was performed of the chest  with IV contrast.   Axial; sagittal and coronal MIP images were reviewed..     FINDINGS:  Chest CT:    CTA chest: Good quality CTA demonstrates no evidence of pulmonary  embolus. The thoracic aorta is intact. The heart and great vessels  demonstrate no acute abnormality. Coronary artery calcifications are  present, as are calcifications within the aortic valve.    Lungs demonstrate patchy areas of atelectasis and moderate emphysema  with a predominance in the upper lobes. Subtle groundglass  opacification suggested within the periphery of the lingula and left  lower lobe.    There is no evidence of pathologic lymph node enlargement.    Thyroid gland and esophagus are grossly normal.    Visualized portions of the upper abdomen demonstrate no acute  abnormality.    Bony structures: No acute abnormality. Postoperative changes of the  right clavicle. Mild degenerative changes of the thoracic spine.         Impression    IMPRESSION:   Good quality CTA demonstrates no evidence of acute vascular  abnormality.    Although limited by respiratory motion, subtle pneumonia is suggested  in the lingula and left lower lobe.    Moderate emphysema.    This facility minimizes radiation dose by adjusting the mA and/or kV  according to each patient size.      This CT scan was performed using one or more the following dose  reduction techniques:    -Automated exposure control,  -Adjustment of the mA and/or kV according to patient's size, and/or,  -Use  of iterative reconstruction technique.    MANOHAR BENNETT MD         SYSTEM ID:  [id number]

## 2023-01-17 NOTE — PLAN OF CARE
Goal Outcome Evaluation:       Reason for hospital stay:  pneumonia   Living situation PTA: home   Most recent vitals: /61 (BP Location: Right arm, Patient Position: Semi-Ogden's, Cuff Size: Adult Regular)   Pulse 60   Temp 98.5  F (36.9  C) (Tympanic)   Resp 16   Ht 1.829 m (6')   Wt 69.9 kg (154 lb)   SpO2 92%   BMI 20.89 kg/m      Pain Management:  denied pain   LOC:  A&O   Cardiac:  apical regular   Respiratory:  2 LPM NC - dyspnea on exertion   GI:  WNL  :  WNL  Skin Issues:  please see charting - pt did refuse to let nurse look at buttocks and groin     IVF:  saline locked   ABX:  rocephin     Nutrition: regular diet   ADL's:  independent   Ambulation:independent  Safety:  call light in place, bed in low position with wheels locked       Comments:      Face to face report given with opportunity to observe patient.    Report given to Rach Orozco RN   1/17/2023  7:30 PM

## 2023-01-18 LAB
ANION GAP SERPL CALCULATED.3IONS-SCNC: 7 MMOL/L (ref 7–15)
BUN SERPL-MCNC: 18.8 MG/DL (ref 8–23)
CALCIUM SERPL-MCNC: 8.3 MG/DL (ref 8.8–10.2)
CHLORIDE SERPL-SCNC: 108 MMOL/L (ref 98–107)
CREAT SERPL-MCNC: 0.81 MG/DL (ref 0.67–1.17)
DEPRECATED HCO3 PLAS-SCNC: 27 MMOL/L (ref 22–29)
ERYTHROCYTE [DISTWIDTH] IN BLOOD BY AUTOMATED COUNT: 14.1 % (ref 10–15)
GFR SERPL CREATININE-BSD FRML MDRD: >90 ML/MIN/1.73M2
GLUCOSE BLDC GLUCOMTR-MCNC: 157 MG/DL (ref 70–99)
GLUCOSE BLDC GLUCOMTR-MCNC: 166 MG/DL (ref 70–99)
GLUCOSE BLDC GLUCOMTR-MCNC: 187 MG/DL (ref 70–99)
GLUCOSE BLDC GLUCOMTR-MCNC: 189 MG/DL (ref 70–99)
GLUCOSE BLDC GLUCOMTR-MCNC: 258 MG/DL (ref 70–99)
GLUCOSE BLDC GLUCOMTR-MCNC: 74 MG/DL (ref 70–99)
GLUCOSE SERPL-MCNC: 119 MG/DL (ref 70–99)
HCT VFR BLD AUTO: 35.5 % (ref 40–53)
HGB BLD-MCNC: 12.1 G/DL (ref 13.3–17.7)
MAGNESIUM SERPL-MCNC: 1.5 MG/DL (ref 1.7–2.3)
MAGNESIUM SERPL-MCNC: 2.3 MG/DL (ref 1.7–2.3)
MCH RBC QN AUTO: 31.2 PG (ref 26.5–33)
MCHC RBC AUTO-ENTMCNC: 34.1 G/DL (ref 31.5–36.5)
MCV RBC AUTO: 92 FL (ref 78–100)
PLATELET # BLD AUTO: 106 10E3/UL (ref 150–450)
POTASSIUM SERPL-SCNC: 3.7 MMOL/L (ref 3.4–5.3)
PROCALCITONIN SERPL IA-MCNC: 0.13 NG/ML
RBC # BLD AUTO: 3.88 10E6/UL (ref 4.4–5.9)
SODIUM SERPL-SCNC: 142 MMOL/L (ref 136–145)
WBC # BLD AUTO: 6.3 10E3/UL (ref 4–11)

## 2023-01-18 PROCEDURE — 94640 AIRWAY INHALATION TREATMENT: CPT

## 2023-01-18 PROCEDURE — 83735 ASSAY OF MAGNESIUM: CPT | Performed by: INTERNAL MEDICINE

## 2023-01-18 PROCEDURE — 250N000011 HC RX IP 250 OP 636: Performed by: INTERNAL MEDICINE

## 2023-01-18 PROCEDURE — 85027 COMPLETE CBC AUTOMATED: CPT | Performed by: INTERNAL MEDICINE

## 2023-01-18 PROCEDURE — 80048 BASIC METABOLIC PNL TOTAL CA: CPT | Performed by: INTERNAL MEDICINE

## 2023-01-18 PROCEDURE — 250N000013 HC RX MED GY IP 250 OP 250 PS 637: Performed by: INTERNAL MEDICINE

## 2023-01-18 PROCEDURE — 87070 CULTURE OTHR SPECIMN AEROBIC: CPT | Performed by: INTERNAL MEDICINE

## 2023-01-18 PROCEDURE — 999N000157 HC STATISTIC RCP TIME EA 10 MIN

## 2023-01-18 PROCEDURE — 84145 PROCALCITONIN (PCT): CPT | Performed by: INTERNAL MEDICINE

## 2023-01-18 PROCEDURE — 120N000001 HC R&B MED SURG/OB

## 2023-01-18 PROCEDURE — 94640 AIRWAY INHALATION TREATMENT: CPT | Mod: 76

## 2023-01-18 PROCEDURE — 99232 SBSQ HOSP IP/OBS MODERATE 35: CPT | Performed by: INTERNAL MEDICINE

## 2023-01-18 PROCEDURE — 250N000009 HC RX 250: Performed by: INTERNAL MEDICINE

## 2023-01-18 PROCEDURE — 36415 COLL VENOUS BLD VENIPUNCTURE: CPT | Performed by: INTERNAL MEDICINE

## 2023-01-18 PROCEDURE — 250N000012 HC RX MED GY IP 250 OP 636 PS 637: Performed by: INTERNAL MEDICINE

## 2023-01-18 RX ORDER — PREDNISONE 20 MG/1
40 TABLET ORAL DAILY
Status: DISCONTINUED | OUTPATIENT
Start: 2023-01-18 | End: 2023-01-20 | Stop reason: HOSPADM

## 2023-01-18 RX ORDER — MAGNESIUM SULFATE HEPTAHYDRATE 40 MG/ML
4 INJECTION, SOLUTION INTRAVENOUS ONCE
Status: COMPLETED | OUTPATIENT
Start: 2023-01-18 | End: 2023-01-18

## 2023-01-18 RX ORDER — PREDNISONE 20 MG/1
40 TABLET ORAL DAILY
Status: DISCONTINUED | OUTPATIENT
Start: 2023-01-18 | End: 2023-01-18

## 2023-01-18 RX ADMIN — DOXYCYCLINE HYCLATE 100 MG: 100 CAPSULE ORAL at 08:14

## 2023-01-18 RX ADMIN — IPRATROPIUM BROMIDE AND ALBUTEROL SULFATE 3 ML: 2.5; .5 SOLUTION RESPIRATORY (INHALATION) at 01:55

## 2023-01-18 RX ADMIN — MAGNESIUM SULFATE HEPTAHYDRATE 4 G: 40 INJECTION, SOLUTION INTRAVENOUS at 08:37

## 2023-01-18 RX ADMIN — DOXYCYCLINE HYCLATE 100 MG: 100 CAPSULE ORAL at 20:09

## 2023-01-18 RX ADMIN — CLOPIDOGREL BISULFATE 75 MG: 75 TABLET ORAL at 10:27

## 2023-01-18 RX ADMIN — PREDNISONE 40 MG: 20 TABLET ORAL at 10:24

## 2023-01-18 RX ADMIN — IPRATROPIUM BROMIDE AND ALBUTEROL SULFATE 3 ML: 2.5; .5 SOLUTION RESPIRATORY (INHALATION) at 19:43

## 2023-01-18 RX ADMIN — IPRATROPIUM BROMIDE AND ALBUTEROL SULFATE 3 ML: 2.5; .5 SOLUTION RESPIRATORY (INHALATION) at 07:58

## 2023-01-18 RX ADMIN — IPRATROPIUM BROMIDE AND ALBUTEROL SULFATE 3 ML: 2.5; .5 SOLUTION RESPIRATORY (INHALATION) at 13:06

## 2023-01-18 RX ADMIN — METFORMIN HYDROCHLORIDE 500 MG: 500 TABLET, FILM COATED ORAL at 10:31

## 2023-01-18 RX ADMIN — ATORVASTATIN CALCIUM 80 MG: 40 TABLET, FILM COATED ORAL at 20:07

## 2023-01-18 RX ADMIN — METFORMIN HYDROCHLORIDE 500 MG: 500 TABLET, FILM COATED ORAL at 16:29

## 2023-01-18 RX ADMIN — CEFTRIAXONE SODIUM 1 G: 1 INJECTION, SOLUTION INTRAVENOUS at 18:43

## 2023-01-18 RX ADMIN — ASPIRIN 81 MG 81 MG: 81 TABLET ORAL at 10:26

## 2023-01-18 RX ADMIN — ATENOLOL 50 MG: 50 TABLET ORAL at 10:25

## 2023-01-18 ASSESSMENT — ACTIVITIES OF DAILY LIVING (ADL)
ADLS_ACUITY_SCORE: 22

## 2023-01-18 NOTE — PROGRESS NOTES
[facility]    Medicine Progress Note - Hospitalist Service    Date of Admission:  1/16/2023    History of Present Illness:   Jamie Chu is a 72 year old male who presented to the ED on 01/16/23 for shortness of breath and cough x 3 days. He was sent over from [facility] for low oxygenation. He has a past medical history significant for COPD, heart failure with preserved ejection fraction, CAD status post MI and PCI, type 2 diabetes, hypertension. He had no fevers, chills, chest pain, dizziness, hemoptysis, or peripheral edema. In the ER vitals were 188/66, 74 HR, temperature 36.2 C, respirations 24, oxygenation 85% on room air. EKG showed no signs of acute ischemia. Troponin was negative, respiratory viral panel was negative. D-dimer was elevated at 0.9 so a CTA of chest was completed showing emphysema and left lower lobe and lingular consolidation, suggesting pneumonia. He was given ceftriaxone 1g IV and azithromycin 500mg IV in ER. Patient continued to have low oxygenation so patient was admitted for treatment of pneumonia and hypoxia.      When admitted the patient had rocephin IV continued and doxycycline PO for the pneumonia. He has scheduled DuoNebs and albuterol as needed. Currently on 1 LPM nasal cannula, continues to cough but shortness of breath has improved since arrival. He had tele monitoring due to extensive cardiac history of MI and PCI but has had no concerning findings so was discontinued 1/17. No current chest pain or palpitations. He continues to have an appetite but is fatigued.     Assessment & Plan   1. Community-acquired pneumonia: He remains on rocephin and doxycycline for pneumonia. Has been continuing scheduled DuoNebs and albuterol as needed. Will get a sputum culture. No significant fevers. WBCs have remained in range and are trending down.  - 1/18: Feeling okay today, still intermittently coughing, doing well on 1 LPM oxygenating in mid 90s. Continuing  rocephin IV and doxycycline PO. No fevers, no chest pain. He has not been able to get enough sputum to culture yet. Still waiting on blood culture results. WBC decreasing at 6.3, procalcitonin is 0.13. Going to try and get him up and walking today. Platelets are 106 today, likely due to the pneumonia. He is on ASA, plavix and lovenox. Going to continue to monitor platelet counts.    2. Hypoxic respiratory failure: Likely due to pneumonia and possible COPD exacerbation. Overnight titrated up to 4 LPM. Today he has been on 1.5 LPM via nasal cannula and stats have remained in 90s.   - 1/18: Was able to lower oxygen requirement. 1 LPM via nasal cannula, stats have remained in 90s. He is going to start short course of prednisone today to hopefully help get him off the oxygen.      3. COPD: COPD exacerbation could be contributing to symptoms. Continuing antibiotics and nebulizers. Will continue to monitor oxygen.    4. Diabetes type 2: Had multiple low BGs overnight 1/17, diet was switched to regular diet. Taking home dose of Metformin. Will continue to monitor.   - 1/18: Discontinued the sliding scale insulin due to patient refusal. Blood glucose has been stable during stay with Metformin. Appetite is good.     5. Hypertension: Most recent blood pressure 139/60. Taking atenolol, will continue to monitor.     6. Coronary artery disease status post stenting: No chest pain or signs of ischemic episode. No concerning findings on tele monitoring. Tele monitoring discontinued 1/17/23. Will continue aspirin and Plavix.     7. Hypomagnesemia: Hypomagnesemia was present on admission. 1.5 this morning. Monitored by nursing and replaced as needed.      8. Psoriasis: Continue home treatment.       Diet: Combination Diet Regular Diet    DVT Prophylaxis: Enoxaparin (Lovenox) SQ  Patel Catheter: Not present  Lines: None     Cardiac Monitoring: None  Code Status: Full Code      Clinically Significant Risk Factors          #  Hypocalcemia: Lowest Ca = 8.3 mg/dL in last 2 days, will monitor and replace as appropriate   # Hypomagnesemia: Lowest Mg = 1.5 mg/dL in last 2 days, will replace as needed     # Thrombocytopenia: Lowest platelets = 106 in last 2 days, will monitor for bleeding                 Disposition Plan  Likely needs at least 1 more day before discharge to hopefully discontinue the oxygen. Will discharge home when stable, lives with nephew.        FATIMAH Dailey  Hospitalist Service  [facility]  Securely message with [employer] (more info)  Text page via [facility] Paging/Directory   ______________________________________________________________________    Interval History   Patient alert and pleasant today. Intermittent productive coughing and wheezing. Wheezing has improved since yesterday. Did well overnight, still fatigued. Still dependent on oxygen but improving, on 1 LPM. Going to try getting up and walking today. Hopefully will be able to discharge in the next 1-2 days.    Physical Exam   Vital Signs: Temp: 99.6  F (37.6  C) Temp src: Tympanic BP: 133/64 Pulse: 52   Resp: 18 SpO2: 93 % O2 Device: Nasal cannula Oxygen Delivery: 1 LPM  Weight: 154 lbs 0 oz    Constitutional: awake, alert, cooperative, no apparent distress, and appears stated age  Respiratory: no increased work of breathing and wheezing diffuse  Cardiovascular:  regular rate and rhythm, normal S1 and S2, no S3 or S4, and no murmur noted  GI: No scars, normal bowel sounds, soft, non-distended, non-tender, no masses palpated, no hepatosplenomegally  Skin: no bruising or bleeding and normal skin color, texture, turgor      Data   ------------------------- PAST 24 HR DATA REVIEWED -----------------------------------------------    I have personally reviewed the following data over the past 24 hrs:    6.3  \   12.1 (L)   / 106 (L)     142 108 (H) 18.8 /  119 (H)   3.7 27 0.81 \       Procal: 0.13 (H) CRP: N/A Lactic Acid: N/A         Imaging  results reviewed over the past 24 hrs:     Recent Labs   Lab 01/18/23  0533 01/18/23  0204 01/17/23  2132 01/17/23  0816 01/17/23  0524 01/16/23 2121 01/16/23  1524   WBC 6.3  --   --   --  7.7  --  9.5   HGB 12.1*  --   --   --  13.0*  --  14.6   MCV 92  --   --   --  90  --  90   *  --   --   --  127*  --  135*     --   --   --  139  --  140   POTASSIUM 3.7  --   --   --  4.0  --  4.5   CHLORIDE 108*  --   --   --  105  --  103   CO2 27  --   --   --  25  --  23   BUN 18.8  --   --   --  26.1*  --  27.1*   CR 0.81  --   --   --  0.87  --  0.86   ANIONGAP 7  --   --   --  9  --  14   RUMA 8.3*  --   --   --  8.4*  --  8.7*   * 157* 95   < > 121*   < > 149*   ALBUMIN  --   --   --   --   --   --  4.2   PROTTOTAL  --   --   --   --   --   --  6.5   BILITOTAL  --   --   --   --   --   --  0.8   ALKPHOS  --   --   --   --   --   --  95   ALT  --   --   --   --   --   --  30   AST  --   --   --   --   --   --  44    < > = values in this interval not displayed.

## 2023-01-18 NOTE — PLAN OF CARE
Goal Outcome Evaluation:    Max T 99.6, remains on 1 LPM O2 , SATS 92%.  Removed O2 for 20 mins, sats dropped to 88% and stayed there, place pt back on 1 LPM.  Has prolonged ex wheezes t/o.  NP cough, minimal.  Prednisone started today, continues with nebs. Walked 2x in su today and up in chair for meals.   Mag replaced, recheck in a.m.  Call light in reach, makes needs known    Face to face report given with opportunity to observe patient.    Report given to Era Spring RN   1/18/2023  3:43 PM

## 2023-01-18 NOTE — PLAN OF CARE
Most recent vitals: T 98.5F Tympanic, Pulse 62 BPM, 20 Resp, SaO2 94% Nasal cannula 1 /64 BP Right Arm  @1215.    Pt is A&O, VSS except SaO2 drops to between 88 and 91 after ambulation, revovers to 93/94% within two minutes of sitting. Remains on 1LPM via NC. Patient ate well for breakfast and lunch. Ate lunch in chair. Patient was ambulated 3/4 around [facility] unit twice today. Patient had a strong steady gait. Patient reports concern about housing upon discharge, lives with nephew who was just diagnosed with bronchitis.  Face to face report given with opportunity to observe patient.    Report given to SAM Ivan   1/18/2023  2:04 PM

## 2023-01-18 NOTE — PLAN OF CARE
Most recent vitals: /61 (BP Location: Right arm, Patient Position: Semi-Ogden's, Cuff Size: Adult Regular)   Pulse 60   Temp 99.5  F (37.5  C) (Tympanic)   Resp 20   Ht 1.829 m (6')   Wt 69.9 kg (154 lb)   SpO2 95%   BMI 20.89 kg/m      Pt is A&O, vss except for bouts of bradycardia which have been present since admission, afebrile, denying pain. Assessments as charted. Remains on 1 LPM o2 via NC with sats in low to mid 90's. Up independently in room without issue. BG at bed time was 95. Pt was provided with night time snack as he stated he didn't eat much of his supper. Still refusing sliding scale insulin coverage and wishes to remain controlled on metformin. 0200 BG check 157. Morning magnesium was 1.5. RN managed order awaiting replacement verification from pharmacy. Pt was able to produce sputum sample throughout night, lab contacted staff to let them know that the sample was not sufficient and needs to be recollected. Pt is aware and new specimen cup is in room. IV SL. Rocephin and doxycycline for abx. Bed is locked and low, call light is within reach, pt calls appropriately.      Face to face report given with opportunity to observe patient.    Report given to SAM Ivan RN   1/18/2023  7:00 AM

## 2023-01-18 NOTE — PHARMACY
Pharmacy Antimicrobial Stewardship Assessment     Current Antimicrobial Therapy:  Anti-infectives (From now, onward)    Start     Dose/Rate Route Frequency Ordered Stop    23 1800  cefTRIAXone in d5w (ROCEPHIN) intermittent infusion 1 g         1 g  over 30 Minutes Intravenous EVERY 24 HOURS 23  doxycycline hyclate (VIBRAMYCIN) capsule 100 mg         100 mg Oral EVERY 12 HOURS SCHEDULED 23            Indication: CAP    Days of Therapy: 3     Pertinent Labs:    Recent labs: (last 7 days)     23  1524 23  0524 23  0533   WBC 9.5 7.7 6.3   LACT 1.8  --   --    PCAL  --   --  0.13*       Temperature:  Temp (24hrs), Av.2  F (37.3  C), Min:98.5  F (36.9  C), Max:99.6  F (37.6  C)      Culture Results:       Recommendations/Interventions:  1. None at this time.    Mora Galaviz, [facility]  2023

## 2023-01-19 LAB
ANION GAP SERPL CALCULATED.3IONS-SCNC: 8 MMOL/L (ref 7–15)
BUN SERPL-MCNC: 17.9 MG/DL (ref 8–23)
CALCIUM SERPL-MCNC: 8.2 MG/DL (ref 8.8–10.2)
CHLORIDE SERPL-SCNC: 104 MMOL/L (ref 98–107)
CREAT SERPL-MCNC: 0.77 MG/DL (ref 0.67–1.17)
DEPRECATED HCO3 PLAS-SCNC: 27 MMOL/L (ref 22–29)
ERYTHROCYTE [DISTWIDTH] IN BLOOD BY AUTOMATED COUNT: 13.6 % (ref 10–15)
GFR SERPL CREATININE-BSD FRML MDRD: >90 ML/MIN/1.73M2
GLUCOSE BLDC GLUCOMTR-MCNC: 142 MG/DL (ref 70–99)
GLUCOSE BLDC GLUCOMTR-MCNC: 190 MG/DL (ref 70–99)
GLUCOSE BLDC GLUCOMTR-MCNC: 210 MG/DL (ref 70–99)
GLUCOSE BLDC GLUCOMTR-MCNC: 262 MG/DL (ref 70–99)
GLUCOSE BLDC GLUCOMTR-MCNC: 279 MG/DL (ref 70–99)
GLUCOSE SERPL-MCNC: 184 MG/DL (ref 70–99)
HCT VFR BLD AUTO: 35.2 % (ref 40–53)
HGB BLD-MCNC: 12.4 G/DL (ref 13.3–17.7)
MAGNESIUM SERPL-MCNC: 1.8 MG/DL (ref 1.7–2.3)
MCH RBC QN AUTO: 31.6 PG (ref 26.5–33)
MCHC RBC AUTO-ENTMCNC: 35.2 G/DL (ref 31.5–36.5)
MCV RBC AUTO: 90 FL (ref 78–100)
PLATELET # BLD AUTO: 117 10E3/UL (ref 150–450)
POTASSIUM SERPL-SCNC: 4.1 MMOL/L (ref 3.4–5.3)
RBC # BLD AUTO: 3.93 10E6/UL (ref 4.4–5.9)
SODIUM SERPL-SCNC: 139 MMOL/L (ref 136–145)
WBC # BLD AUTO: 5.2 10E3/UL (ref 4–11)

## 2023-01-19 PROCEDURE — 99232 SBSQ HOSP IP/OBS MODERATE 35: CPT | Performed by: INTERNAL MEDICINE

## 2023-01-19 PROCEDURE — 36415 COLL VENOUS BLD VENIPUNCTURE: CPT | Performed by: INTERNAL MEDICINE

## 2023-01-19 PROCEDURE — 80048 BASIC METABOLIC PNL TOTAL CA: CPT | Performed by: INTERNAL MEDICINE

## 2023-01-19 PROCEDURE — 999N000157 HC STATISTIC RCP TIME EA 10 MIN

## 2023-01-19 PROCEDURE — 94640 AIRWAY INHALATION TREATMENT: CPT | Mod: 76

## 2023-01-19 PROCEDURE — 120N000001 HC R&B MED SURG/OB

## 2023-01-19 PROCEDURE — 85027 COMPLETE CBC AUTOMATED: CPT | Performed by: INTERNAL MEDICINE

## 2023-01-19 PROCEDURE — 250N000013 HC RX MED GY IP 250 OP 250 PS 637: Performed by: INTERNAL MEDICINE

## 2023-01-19 PROCEDURE — 250N000009 HC RX 250: Performed by: INTERNAL MEDICINE

## 2023-01-19 PROCEDURE — 250N000012 HC RX MED GY IP 250 OP 636 PS 637: Performed by: INTERNAL MEDICINE

## 2023-01-19 PROCEDURE — 250N000011 HC RX IP 250 OP 636: Performed by: INTERNAL MEDICINE

## 2023-01-19 PROCEDURE — 83735 ASSAY OF MAGNESIUM: CPT | Performed by: INTERNAL MEDICINE

## 2023-01-19 PROCEDURE — 94640 AIRWAY INHALATION TREATMENT: CPT

## 2023-01-19 RX ORDER — CARVEDILOL 12.5 MG/1
25 TABLET ORAL 2 TIMES DAILY WITH MEALS
Status: DISCONTINUED | OUTPATIENT
Start: 2023-01-19 | End: 2023-01-20 | Stop reason: HOSPADM

## 2023-01-19 RX ADMIN — ATORVASTATIN CALCIUM 80 MG: 40 TABLET, FILM COATED ORAL at 20:18

## 2023-01-19 RX ADMIN — ATENOLOL 50 MG: 50 TABLET ORAL at 08:40

## 2023-01-19 RX ADMIN — DOXYCYCLINE HYCLATE 100 MG: 100 CAPSULE ORAL at 08:40

## 2023-01-19 RX ADMIN — IPRATROPIUM BROMIDE AND ALBUTEROL SULFATE 3 ML: 2.5; .5 SOLUTION RESPIRATORY (INHALATION) at 19:27

## 2023-01-19 RX ADMIN — CEFTRIAXONE SODIUM 1 G: 1 INJECTION, SOLUTION INTRAVENOUS at 17:19

## 2023-01-19 RX ADMIN — ASPIRIN 81 MG 81 MG: 81 TABLET ORAL at 08:47

## 2023-01-19 RX ADMIN — ENOXAPARIN SODIUM 40 MG: 40 INJECTION SUBCUTANEOUS at 20:18

## 2023-01-19 RX ADMIN — IPRATROPIUM BROMIDE AND ALBUTEROL SULFATE 3 ML: 2.5; .5 SOLUTION RESPIRATORY (INHALATION) at 01:49

## 2023-01-19 RX ADMIN — METFORMIN HYDROCHLORIDE 500 MG: 500 TABLET, FILM COATED ORAL at 17:19

## 2023-01-19 RX ADMIN — METFORMIN HYDROCHLORIDE 500 MG: 500 TABLET, FILM COATED ORAL at 08:40

## 2023-01-19 RX ADMIN — DOXYCYCLINE HYCLATE 100 MG: 100 CAPSULE ORAL at 20:18

## 2023-01-19 RX ADMIN — IPRATROPIUM BROMIDE AND ALBUTEROL SULFATE 3 ML: 2.5; .5 SOLUTION RESPIRATORY (INHALATION) at 07:37

## 2023-01-19 RX ADMIN — IPRATROPIUM BROMIDE AND ALBUTEROL SULFATE 3 ML: 2.5; .5 SOLUTION RESPIRATORY (INHALATION) at 13:30

## 2023-01-19 RX ADMIN — CARVEDILOL 25 MG: 12.5 TABLET, FILM COATED ORAL at 17:19

## 2023-01-19 RX ADMIN — CLOPIDOGREL BISULFATE 75 MG: 75 TABLET ORAL at 08:40

## 2023-01-19 RX ADMIN — PREDNISONE 40 MG: 20 TABLET ORAL at 08:39

## 2023-01-19 ASSESSMENT — ACTIVITIES OF DAILY LIVING (ADL)
ADLS_ACUITY_SCORE: 22

## 2023-01-19 NOTE — PLAN OF CARE
Most recent vitals: /65 (BP Location: Right arm, Patient Position: Semi-Ogden's, Cuff Size: Adult Regular)   Pulse 58   Temp 97  F (36.1  C) (Tympanic)   Resp 18   Ht 1.829 m (6')   Wt 69.9 kg (154 lb)   SpO2 96%   BMI 20.89 kg/m      Pt is A&O, bradycardic at times but otherwise vitally stable, denying pain. Remains on 1 LPM o2 via NC with sats in low to mid 90's. Pt has reported some SOB with activity but it subsides quickly with rest. Nebs scheduled throughout night seem to assist pt in breathing. Assessments as charted. Pt has been pleasant this shift, voicing concerns about potentially discharging home tomorrow d/t a nephew being sick. Pt was provided with reassurance and encouraged to speak with provider. IV SL. Roceph q24. BG checks have been 258 and 190. Independent in room. Bed is locked and low, call light in reach, calls appropriately.     Face to face report given with opportunity to observe patient.    Report given to SAM Girard RN   1/19/2023  7:08 AM

## 2023-01-19 NOTE — PLAN OF CARE
/70 (BP Location: Right arm, Patient Position: Semi-Ogden's, Cuff Size: Adult Regular)   Pulse 55   Temp 97.3  F (36.3  C) (Tympanic)   Resp 18   Ht 1.829 m (6')   Wt 69.9 kg (154 lb)   SpO2 (!) 90%   BMI 20.89 kg/m        Pt is A&O, bradycardic, afebrile, denying pain.  Remains on 1 LPM O2 via NC with sats in low to mid 90's.  Wheezes LLL, LILIANA, dim on right.  Sputum sample was not acquired. IV SL. Abx tx as ordered.  Family brought in fast food before RN could get an accucheck.  fsbs 279.  Bed is locked and in low position, call light is within reach, pt calls appropriately. Pt ambulating in room.  Family here to visit this evening.      Face to face report given with opportunity to observe patient.    Report given to SAM Connell RN   1/19/2023  7:04 PM

## 2023-01-19 NOTE — PROGRESS NOTES
[facility]    Medicine Progress Note - Hospitalist Service    Date of Admission:  1/16/2023    History of Present Illness:   Jamie Chu is a 72 year old male who presented to the ED on 01/16/23 for shortness of breath and cough x 3 days. He was sent over from [facility] and the [facility] for low oxygenation. He has a past medical history significant for COPD, heart failure with preserved ejection fraction, CAD status post MI and PCI, type 2 diabetes, hypertension. He had no fevers, chills, chest pain, dizziness, hemoptysis, or peripheral edema. In the ER vitals were 188/66, 74 HR, temperature 36.2 C, respirations 24, oxygenation 85% on room air. EKG showed no signs of acute ischemia. Troponin was negative, respiratory viral panel was negative. D-dimer was elevated at 0.9 so a CTA of chest was completed showing emphysema and left lower lobe and lingular consolidation, suggesting pneumonia. He was given ceftriaxone 1g IV and azithromycin 500mg IV in ER. Patient continued to have low oxygenation so patient was admitted for treatment of pneumonia and hypoxia.      When admitted the patient had rocephin IV continued and doxycycline PO for the pneumonia. He has scheduled DuoNebs and albuterol as needed. Currently on 1 LPM nasal cannula, continues to cough but shortness of breath has improved since arrival. He had tele monitoring due to extensive cardiac history of MI and PCI but has had no concerning findings so was discontinued 1/17. No current chest pain or palpitations. He continues to have an appetite but is fatigued.     Assessment & Plan   1. Community-acquired pneumonia: With resultant acute hypoxic respiratory failure. He remains on rocephin and doxycycline for pneumonia. Has been continuing scheduled DuoNebs and albuterol as needed. Will get a sputum culture. No significant fevers. WBCs have remained in range and are trending down.  - 1/18: Feeling okay today, still intermittently coughing, doing  well on 1 LPM oxygenating in mid 90s. Continuing rocephin IV and doxycycline PO. No fevers, no chest pain. He has not been able to get enough sputum to culture yet. Still waiting on blood culture results. WBC decreasing at 6.3, procalcitonin is 0.13. Going to try and get him up and walking today. Platelets are 106 today, likely due to the pneumonia. He is on ASA, plavix and lovenox. Going to continue to monitor platelet counts.  - 1/19: He looks good today. Very alert and appears to have more energy. Was able to rest well overnight. He is still intermittently coughing, not coughing up any sputum anymore. Nebs have been helping his breathing, will continue. Continuing rocephin IV and doxycycline PO. No fevers, no chest pain. WBC continuing to decrease. Platelets increased to 117 today.      2.  Acute hypoxic respiratory failure: Likely due to pneumonia and possible COPD exacerbation. Overnight titrated up to 4 LPM. Today he has been on 1.5 LPM via nasal cannula and stats have remained in 90s.   - 1/18: Was able to lower oxygen requirement. 1 LPM via nasal cannula, stats have remained in 90s. He is going to start short course of prednisone today to hopefully help get him off the oxygen.   -1/19: One 1 LPM with stats in low 90s. Continues to get short of breath when getting up to walk to the bathroom. Was able to complete multiple half-laps yesterday. Would like to see him do a full lap today and hopefully get him off the oxygen and if he can do that he can likely be discharged tomorrow. Will continue the prednisone.      3. COPD: COPD exacerbation could be contributing to symptoms. Continuing antibiotics and nebulizers. Will continue to monitor oxygen.    4. Diabetes type 2: Had multiple low BGs overnight 1/17, diet was switched to regular diet. Taking home dose of Metformin. Will continue to monitor.   - 1/18: Discontinued the sliding scale insulin due to patient refusal. Blood glucose has been stable during stay  with Metformin. Appetite is good.   - 1/19: Appetite is good. Glucoses have been high but stable. No lows.     5. Hypertension: Most recent blood pressure 162/70. Taking atenolol, will continue to monitor.     6. Coronary artery disease status post stenting: No chest pain or signs of ischemic episode. No concerning findings on tele monitoring. Tele monitoring discontinued 1/17/23. Will continue aspirin and Plavix.     7. Hypomagnesemia: Hypomagnesemia was present on admission. 1.5 this morning. Monitored by nursing and replaced as needed.   - 1/19: Magnesium 1.8 today.      8. Psoriasis: Continue home treatment.     Diet: Combination Diet Regular Diet    DVT Prophylaxis: Enoxaparin (Lovenox) SQ  Patel Catheter: Not present  Lines: None     Cardiac Monitoring: None  Code Status: Full Code      Clinically Significant Risk Factors            # Hypomagnesemia: Lowest Mg = 1.5 mg/dL in last 2 days, will replace as needed     # Thrombocytopenia: Lowest platelets = 106 in last 2 days, will monitor for bleeding               Disposition Plan    Would like to get patient off oxygen, then discharge hopefully tomorrow. Will discharge home, lives with nephew.       FATIMAH Dailey  Hospitalist Service  [facility]  Securely message with [employer] (more info)  Text page via [employer] Paging/Directory     I personally saw patient in formulated plan with student.  I agree with information as documented, changes made by me.    Hal You MD      ______________________________________________________________________    Interval History   Patient is alert and pleasant today. He was able to sleep well overnight. At around 5 this morning he started having a more persistent cough but has improved since then. He is still on 1 LPM, would like to see if he can get off the oxygen today and tolerate more walking. Hopefully will be able to discharge tomorrow.     Physical Exam   Vital Signs: Temp: 97  F (36.1  C) Temp src:  Tympanic BP: 146/65 Pulse: 58   Resp: 18 SpO2: 96 % O2 Device: Nasal cannula Oxygen Delivery: 1 LPM  Weight: 154 lbs 0 oz    Constitutional: awake, alert, cooperative, no apparent distress, and appears stated age  Respiratory: no increased work of breathing, no retractions,  wheezes diffuse throughout lung fields  Cardiovascular: Regular rate and rhythm, normal S1 and S2, no S3 or S4, and no murmur noted, no peripheral edema  GI: Normal bowel sounds, soft, non-distended, non-tender, no masses palpated, no hepatosplenomegally  Skin: no bruising or bleeding and normal skin color, texture, turgor      Data   ------------------------- PAST 24 HR DATA REVIEWED -----------------------------------------------    I have personally reviewed the following data over the past 24 hrs:    5.2  \   12.4 (L)   / 117 (L)     139 104 17.9 /  142 (H)   4.1 27 0.77 \       ------------------------- ENCOUNTER LABS ----------------------------------------------------------------  Recent Labs   Lab 01/19/23  0837 01/19/23  0526 01/19/23  0209 01/18/23  0843 01/18/23  0533 01/17/23  0816 01/17/23  0524 01/16/23  2121 01/16/23  1524   WBC  --  5.2  --   --  6.3  --  7.7  --  9.5   HGB  --  12.4*  --   --  12.1*  --  13.0*  --  14.6   MCV  --  90  --   --  92  --  90  --  90   PLT  --  117*  --   --  106*  --  127*  --  135*   NA  --  139  --   --  142  --  139  --  140   POTASSIUM  --  4.1  --   --  3.7  --  4.0  --  4.5   CHLORIDE  --  104  --   --  108*  --  105  --  103   CO2  --  27  --   --  27  --  25  --  23   BUN  --  17.9  --   --  18.8  --  26.1*  --  27.1*   CR  --  0.77  --   --  0.81  --  0.87  --  0.86   ANIONGAP  --  8  --   --  7  --  9  --  14   RUMA  --  8.2*  --   --  8.3*  --  8.4*  --  8.7*   * 184* 190*   < > 119*   < > 121*   < > 149*   ALBUMIN  --   --   --   --   --   --   --   --  4.2   PROTTOTAL  --   --   --   --   --   --   --   --  6.5   BILITOTAL  --   --   --   --   --   --   --   --  0.8   ALKPHOS  --    --   --   --   --   --   --   --  95   ALT  --   --   --   --   --   --   --   --  30   AST  --   --   --   --   --   --   --   --  44    < > = values in this interval not displayed.

## 2023-01-19 NOTE — PLAN OF CARE
/68 (BP Location: Right arm, Patient Position: Semi-Ogden's, Cuff Size: Adult Regular)   Pulse 64   Temp 98  F (36.7  C) (Tympanic)   Resp 18   Ht 1.829 m (6')   Wt 69.9 kg (154 lb)   SpO2 93%   BMI 20.89 kg/m        Took over care of patient at 1500.  Pt is a/o, pleasant and cooperative.  No pain reported.  IV ABX administered at ordered.  No cough.  1 L O2 NC.  VSS.  Pt remained  Safe and free from injury remainder of shift.    Call light in reach, makes needs known, skid free socks on, bed in low position, upper side rails engaged.    Face to face report given with opportunity to observe patient.    Report given to SAM Mosley RN   1/18/2023  8:07 PM

## 2023-01-19 NOTE — PROVIDER NOTIFICATION
Pt platelets are 106 which seemed to be a significant decrease from baseline noted a few months ago. Nurse paged MD to make him aware and ask if he would like lovenox to be held. MD called nurse and instructed nurse to hold med for tonight. Med returned to [facility] and documented as not given.

## 2023-01-19 NOTE — PHARMACY
Pharmacy Antimicrobial Stewardship Assessment     Current Antimicrobial Therapy:  Anti-infectives (From now, onward)    Start     Dose/Rate Route Frequency Ordered Stop    23 1800  cefTRIAXone in d5w (ROCEPHIN) intermittent infusion 1 g         1 g  over 30 Minutes Intravenous EVERY 24 HOURS 23  doxycycline hyclate (VIBRAMYCIN) capsule 100 mg         100 mg Oral EVERY 12 HOURS SCHEDULED 23            Indication: CAP    Days of Therapy: 4     Pertinent Labs:    Recent labs: (last 7 days)     23  1524 23  0524 23  0533 23  0526   WBC 9.5 7.7 6.3 5.2   LACT 1.8  --   --   --    PCAL  --   --  0.13*  --        Temperature:  Temp (24hrs), Av.6  F (36.4  C), Min:97  F (36.1  C), Max:98.1  F (36.7  C)      Culture Results:       Recommendations/Interventions:  1. None at this time.    Mora Galaviz, [facility]  2023

## 2023-01-20 VITALS
WEIGHT: 154 LBS | OXYGEN SATURATION: 88 % | SYSTOLIC BLOOD PRESSURE: 178 MMHG | TEMPERATURE: 98.1 F | HEART RATE: 78 BPM | DIASTOLIC BLOOD PRESSURE: 82 MMHG | BODY MASS INDEX: 20.86 KG/M2 | RESPIRATION RATE: 24 BRPM | HEIGHT: 72 IN

## 2023-01-20 LAB
ANION GAP SERPL CALCULATED.3IONS-SCNC: 7 MMOL/L (ref 7–15)
BUN SERPL-MCNC: 18.7 MG/DL (ref 8–23)
CALCIUM SERPL-MCNC: 8.5 MG/DL (ref 8.8–10.2)
CHLORIDE SERPL-SCNC: 104 MMOL/L (ref 98–107)
CREAT SERPL-MCNC: 0.8 MG/DL (ref 0.67–1.17)
DEPRECATED HCO3 PLAS-SCNC: 29 MMOL/L (ref 22–29)
ERYTHROCYTE [DISTWIDTH] IN BLOOD BY AUTOMATED COUNT: 13.4 % (ref 10–15)
GFR SERPL CREATININE-BSD FRML MDRD: >90 ML/MIN/1.73M2
GLUCOSE BLDC GLUCOMTR-MCNC: 118 MG/DL (ref 70–99)
GLUCOSE BLDC GLUCOMTR-MCNC: 172 MG/DL (ref 70–99)
GLUCOSE SERPL-MCNC: 160 MG/DL (ref 70–99)
HCT VFR BLD AUTO: 36 % (ref 40–53)
HGB BLD-MCNC: 12.6 G/DL (ref 13.3–17.7)
MAGNESIUM SERPL-MCNC: 1.6 MG/DL (ref 1.7–2.3)
MCH RBC QN AUTO: 31.3 PG (ref 26.5–33)
MCHC RBC AUTO-ENTMCNC: 35 G/DL (ref 31.5–36.5)
MCV RBC AUTO: 89 FL (ref 78–100)
PLATELET # BLD AUTO: 151 10E3/UL (ref 150–450)
POTASSIUM SERPL-SCNC: 4 MMOL/L (ref 3.4–5.3)
RBC # BLD AUTO: 4.03 10E6/UL (ref 4.4–5.9)
SODIUM SERPL-SCNC: 140 MMOL/L (ref 136–145)
WBC # BLD AUTO: 7.3 10E3/UL (ref 4–11)

## 2023-01-20 PROCEDURE — 94640 AIRWAY INHALATION TREATMENT: CPT | Mod: 76

## 2023-01-20 PROCEDURE — 999N000157 HC STATISTIC RCP TIME EA 10 MIN

## 2023-01-20 PROCEDURE — 85014 HEMATOCRIT: CPT | Performed by: INTERNAL MEDICINE

## 2023-01-20 PROCEDURE — 250N000009 HC RX 250: Performed by: INTERNAL MEDICINE

## 2023-01-20 PROCEDURE — 36415 COLL VENOUS BLD VENIPUNCTURE: CPT | Performed by: INTERNAL MEDICINE

## 2023-01-20 PROCEDURE — 94640 AIRWAY INHALATION TREATMENT: CPT

## 2023-01-20 PROCEDURE — 83735 ASSAY OF MAGNESIUM: CPT | Performed by: INTERNAL MEDICINE

## 2023-01-20 PROCEDURE — 250N000013 HC RX MED GY IP 250 OP 250 PS 637: Performed by: INTERNAL MEDICINE

## 2023-01-20 PROCEDURE — 250N000012 HC RX MED GY IP 250 OP 636 PS 637: Performed by: INTERNAL MEDICINE

## 2023-01-20 PROCEDURE — 99239 HOSP IP/OBS DSCHRG MGMT >30: CPT | Performed by: INTERNAL MEDICINE

## 2023-01-20 PROCEDURE — 80048 BASIC METABOLIC PNL TOTAL CA: CPT | Performed by: INTERNAL MEDICINE

## 2023-01-20 RX ORDER — IPRATROPIUM BROMIDE AND ALBUTEROL SULFATE 2.5; .5 MG/3ML; MG/3ML
3 SOLUTION RESPIRATORY (INHALATION) EVERY 6 HOURS
Qty: 90 ML | Refills: 3 | Status: SHIPPED | OUTPATIENT
Start: 2023-01-20 | End: 2024-01-12

## 2023-01-20 RX ORDER — DOXYCYCLINE 100 MG/1
100 CAPSULE ORAL EVERY 12 HOURS
Qty: 6 CAPSULE | Refills: 0 | Status: SHIPPED | OUTPATIENT
Start: 2023-01-20 | End: 2023-01-23

## 2023-01-20 RX ORDER — PREDNISONE 20 MG/1
40 TABLET ORAL DAILY
Qty: 2 TABLET | Refills: 0 | Status: SHIPPED | OUTPATIENT
Start: 2023-01-21 | End: 2023-01-22

## 2023-01-20 RX ORDER — BENZONATATE 200 MG/1
200 CAPSULE ORAL 3 TIMES DAILY PRN
Qty: 30 CAPSULE | Refills: 0 | Status: SHIPPED | OUTPATIENT
Start: 2023-01-20 | End: 2023-01-25

## 2023-01-20 RX ADMIN — CLOPIDOGREL BISULFATE 75 MG: 75 TABLET ORAL at 09:17

## 2023-01-20 RX ADMIN — METFORMIN HYDROCHLORIDE 500 MG: 500 TABLET, FILM COATED ORAL at 08:14

## 2023-01-20 RX ADMIN — IPRATROPIUM BROMIDE AND ALBUTEROL SULFATE 3 ML: 2.5; .5 SOLUTION RESPIRATORY (INHALATION) at 02:05

## 2023-01-20 RX ADMIN — IPRATROPIUM BROMIDE AND ALBUTEROL SULFATE 3 ML: 2.5; .5 SOLUTION RESPIRATORY (INHALATION) at 07:39

## 2023-01-20 RX ADMIN — DOXYCYCLINE HYCLATE 100 MG: 100 CAPSULE ORAL at 08:22

## 2023-01-20 RX ADMIN — CARVEDILOL 25 MG: 12.5 TABLET, FILM COATED ORAL at 08:13

## 2023-01-20 RX ADMIN — PREDNISONE 40 MG: 20 TABLET ORAL at 09:17

## 2023-01-20 RX ADMIN — ASPIRIN 81 MG 81 MG: 81 TABLET ORAL at 09:15

## 2023-01-20 ASSESSMENT — ACTIVITIES OF DAILY LIVING (ADL)
ADLS_ACUITY_SCORE: 22

## 2023-01-20 NOTE — PROVIDER NOTIFICATION
Notified provider following patient about patients's concerns about his shortness of breath during and after ambulation. Saturations after ambulation range 85-88%. Patient takes 15 seconds for saturation to recover from 85% to 88%. Provider will order oxygen qualification evaluation per RT and prescribed a cough suppressive. Nelli Shelton RN

## 2023-01-20 NOTE — PLAN OF CARE
Reason for hospital stay:  Pneumonia of LLL  Living situation PTA: Home w/ nephew  Most recent vitals: /67 (BP Location: Right arm, Patient Position: Semi-Ogden's, Cuff Size: Adult Regular)   Pulse 62   Temp 97.5  F (36.4  C) (Tympanic)   Resp 18   Ht 1.829 m (6')   Wt 69.9 kg (154 lb)   SpO2 94%   BMI 20.89 kg/m      Pain Management:  denies pain  LOC:  A&O x 4  Cardiac:  HRR regular  Respiratory:  Expiratory wheezes throughout bilaterally and diminished in the LLL.  GI:  Normoactive BS x 4  :  Voids spontaneously without difficulty  Skin Issues:  very dry, cracked and peeing skin on hands and feet due to Psoriasis. Currently wearing glove on right hand.    IVF:  Sl  ABX:  Vibramycin and Rocephin    Nutrition: Combination Reg Diet  Activity: Independent. Ambulates in room. Steady gait  Safety:  bed in lowest position w/ wheel locked. Call light and personal items within reach and uses call light appropriately.  Face to face report given with opportunity to observe patient.    Report given to SAM Coburn RN   1/20/2023  7:03 AM

## 2023-01-20 NOTE — CARE PLAN
Patient discharged at 3:35 PM via wheel chair accompanied by other:nephew and staff. Prescriptions sent to patients preferred pharmacy. All belongings sent with patient.     Discharge instructions reviewed with Jamie. Listed belongings gathered and returned to patient. yes    Patient discharged to home.   Report called to NA    Surgical Patient   Surgical Procedures during stay: no  Did patient receive discharge instruction on wound care and recognition of infection symptoms? N/A    MISC  Follow up appointment made:  Yes  Home medications returned to patient: N/A  Patient reports pain was well managed at discharge: Yes

## 2023-01-20 NOTE — DISCHARGE SUMMARY
[facility]  Hospitalist Discharge Summary      Date of Admission:  1/16/2023  Date of Discharge:  1/20/2023  Discharging Provider: Dr. You  Discharge Service: Hospitalist Service    Discharge Diagnoses   Active Problems:     Pneumonia of left lower lobe due to infectious organism  Chronic obstructive pulmonary disease (H)  Acute hypoxic respiratory failure  Type 2 diabetes mellitus without complication, without long-term current use of insulin (H)  History of myocardial infarction (H)  Coronary artery disease involving native coronary artery of native heart without angina pectoris  Chronic diastolic congestive heart failure (H)  Essential (primary) hypertension  Tobacco use disorder  Dyslipidemia  Hypomagnesemia  History of psoriasis       Follow-ups Needed After Discharge   Follow up with PCP in 1 week.     Unresulted Labs Ordered in the Past 30 Days of this Admission     Date and Time Order Name Status Description    1/16/2023  6:08 PM Blood Culture Peripheral Blood Preliminary     1/16/2023  6:08 PM Blood Culture Peripheral Blood Preliminary       Blood Cultures had no growth in 3 days.    Discharge Disposition   Discharged to home  Condition at discharge: Stable    History of Present Illness  Jamie Chu is a 72 year old male who presented to the ED on 01/16/23 for shortness of breath and cough x 3 days. He was sent over from [facility] for low oxygenation. Please see admission H&P for further details.        Hospital Course      Jamie was admitted on 1/17/23 for treatment of community-acquired pneumonia with acute hypoxic respiratory failure. He has a past medical history significant for COPD, heart failure with preserved ejection fraction, CAD status post MI and PCI, type 2 diabetes, hypertension. He was treated with rocephin and doxycycline along with prednisone and oxygen. He was treated with duonebs and albuterol as needed. His blood cultures showed no growth. No sputum  culture was able to be collected. Since admit, he is now tolerating room air and has significantly improved his shortness of breath and cough. He is now stable and can continue treatment at home. He lives independently with nephew. He will be discharged on prednisone and doxycycline. He will continue chronic disease management at home.  He will need to follow-up with his primary care physician within the week to assess for continued improvement.      Consultations This Hospital Stay   RESPIRATORY CARE IP CONSULT  RESPIRATORY CARE IP CONSULT  SMOKING CESSATION PROGRAM IP CONSULT    Code Status   Full Code    Time Spent on this Encounter   Hal SHELBY MD, personally saw the patient today and spent greater than 30 minutes discharging this patient.       FATIMAH Dailey  [facility]  [address]  [facility] [address]  Phone: [phone]  Fax: [phone]  ______________________________________________________________________    Physical Exam   Vital Signs: Temp: 97.5  F (36.4  C) Temp src: Tympanic BP: 178/82 Pulse: 78   Resp: 18 SpO2: (!) 89 % O2 Device: None (Room air) (re,moved from Oxygen after nebulizer) Oxygen Delivery: 1 LPM (placed pt on r/a)  Weight: 154 lbs 0 oz  Constitutional: awake, alert, cooperative, no apparent distress, and appears stated age  Eyes: PERRLA, no redness, or icterus   ENT: Normocephalic, without obvious abnormality, external ears without lesions, oral pharynx with moist mucous membranes  Lymph: No lymphadenopathy  Respiratory: No increased work of breathing, good air exchange and some diffuse expiratory wheezes  Cardiovascular: Regular rate and rhythm, normal S1 and S2, no S3 or S4, and no murmur noted, no edema noted   GI: Normal bowel sounds, soft, non-distended, non-tender, no masses palpated, no hepatosplenomegally  Skin: no bruising or bleeding, psoriasis patches on left hand.       Primary Care Physician   Physician No Ref-Primary    Discharge Orders    No discharge procedures on file.    Significant Results and Procedures   Most Recent 3 CBC's:Recent Labs   Lab Test 01/20/23  0530 01/19/23  0526 01/18/23  0533   WBC 7.3 5.2 6.3   HGB 12.6* 12.4* 12.1*   MCV 89 90 92    117* 106*     Most Recent 3 BMP's:Recent Labs   Lab Test 01/20/23  0808 01/20/23  0530 01/19/23  2154 01/19/23  0837 01/19/23  0526 01/18/23  0843 01/18/23  0533   NA  --  140  --   --  139  --  142   POTASSIUM  --  4.0  --   --  4.1  --  3.7   CHLORIDE  --  104  --   --  104  --  108*   CO2  --  29  --   --  27  --  27   BUN  --  18.7  --   --  17.9  --  18.8   CR  --  0.80  --   --  0.77  --  0.81   ANIONGAP  --  7  --   --  8  --  7   RUMA  --  8.5*  --   --  8.2*  --  8.3*   * 160* 262*   < > 184*   < > 119*    < > = values in this interval not displayed.   ,   Results for orders placed or performed during the hospital encounter of 01/16/23   XR Chest Port 1 View    Narrative    Procedure:XR CHEST PORT 1 VIEW    Clinical history:Male, 72 years, Shortness of breath    Technique: Single view was obtained.    Comparison: 10/17/2022    Findings: The cardiac silhouette is normal. The pulmonary vasculature  is normal.    The lungs are clear. Bony structures again demonstrate postoperative  changes of the right clavicle.      Impression    Impression:   No acute abnormality. No evidence of acute or active cardiopulmonary  disease.    MANOHAR BENNETT MD         SYSTEM ID:  [id number]   CTA Chest with Contrast    Narrative    CTA CHEST WITH CONTRAST  1/16/2023 5:00 PM    CLINICAL HISTORY: Male, age 72 years,  Shortness of breath;    Comparison:  Chest x-ray 1/16/2023    TECHNIQUE:  CT was performed of the chest  with IV contrast.   Axial; sagittal and coronal MIP images were reviewed..     FINDINGS:  Chest CT:    CTA chest: Good quality CTA demonstrates no evidence of pulmonary  embolus. The thoracic aorta is intact. The heart and great vessels  demonstrate no acute abnormality. Coronary  artery calcifications are  present, as are calcifications within the aortic valve.    Lungs demonstrate patchy areas of atelectasis and moderate emphysema  with a predominance in the upper lobes. Subtle groundglass  opacification suggested within the periphery of the lingula and left  lower lobe.    There is no evidence of pathologic lymph node enlargement.    Thyroid gland and esophagus are grossly normal.    Visualized portions of the upper abdomen demonstrate no acute  abnormality.    Bony structures: No acute abnormality. Postoperative changes of the  right clavicle. Mild degenerative changes of the thoracic spine.         Impression    IMPRESSION:   Good quality CTA demonstrates no evidence of acute vascular  abnormality.    Although limited by respiratory motion, subtle pneumonia is suggested  in the lingula and left lower lobe.    Moderate emphysema.    This facility minimizes radiation dose by adjusting the mA and/or kV  according to each patient size.      This CT scan was performed using one or more the following dose  reduction techniques:    -Automated exposure control,  -Adjustment of the mA and/or kV according to patient's size, and/or,  -Use of iterative reconstruction technique.    MANOHAR BENNETT MD         SYSTEM ID:  [id number]       Discharge Medications         Reason for your hospital stay    COPD exacerbation, CAP     Follow-up and recommended labs and tests     Follow up with primary care provider, Physician No Ref-Primary, within 7 days for hospital follow- up.  No follow up labs or test are needed.     Activity    Your activity upon discharge: activity as tolerated     Diet    Follow this diet upon discharge: Orders Placed This Encounter      Combination Diet Regular Diet          Review of your medicines      START taking      Dose / Directions   doxycycline hyclate 100 MG capsule  Commonly known as: VIBRAMYCIN  Indication: Community Acquired Pneumonia      Dose: 100 mg  Take 1 capsule (100  mg) by mouth every 12 hours for 3 days  Quantity: 6 capsule  Refills: 0     predniSONE 20 MG tablet  Commonly known as: DELTASONE      Dose: 40 mg  Start taking on: January 21, 2023  Take 2 tablets (40 mg) by mouth daily for 1 day  Quantity: 2 tablet  Refills: 0        CONTINUE these medicines which have NOT CHANGED      Dose / Directions   aspirin 81 MG chewable tablet  Commonly known as: ASA      Dose: 81 mg  81 mg  Refills: 0     atenolol 50 MG tablet  Commonly known as: TENORMIN      Dose: 1 tablet  Take 1 tablet by mouth daily  Refills: 0     atorvastatin 80 MG tablet  Commonly known as: LIPITOR      Dose: 80 mg  Take 80 mg by mouth  Refills: 0     clopidogrel 75 MG tablet  Commonly known as: PLAVIX      TAKE FOUR TABLETS BY MOUTH FOR THE FIRST DAY, THEN 1 TABLET DAILY THEREAFTER  Refills: 0     metFORMIN 500 MG tablet  Commonly known as: GLUCOPHAGE      Take by mouth 2 times daily (with meals)  Refills: 0     mometasone 0.1 % external ointment  Commonly known as: ELOCON      APPLY TOPICALLY TO BOTH HANDS TWICE DAILY. USE FOR NO LONGER THAN 14 DAYS PER MONTH.  Refills: 0     nitroGLYcerin 0.4 MG sublingual tablet  Commonly known as: NITROSTAT      Dose: 0.4 mg  Place 0.4 mg under the tongue  Refills: 0           Where to get your medicines      These medications were sent to [id number] - [place] - [address] [facility] [address] [id number] [address] [address]    Phone: [phone]     doxycycline hyclate 100 MG capsule    predniSONE 20 MG tablet         Allergies   No Known Allergies       Hal You MD

## 2023-01-20 NOTE — PLAN OF CARE
Reason for hospital stay:  Pneumonia. Shortness of Breath  Most recent vitals: /82 (BP Location: Right arm, Patient Position: Semi-Ogden's, Cuff Size: Adult Regular)   Pulse 78   Temp 98.1  F (36.7  C) (Temporal)   Resp 24   Ht 1.829 m (6')   Wt 69.9 kg (154 lb)   SpO2 (!) 90%   BMI 20.89 kg/m      Pain Management:  No pain reported.   LOC:  alert and orientated. Knows his limits with the shortness of breath. Cooperative  Cardiac:  WNL  Respiratory:  Has expiratory wheeze and an intermittent productive cough. Cough at times makes chest feel tight. Rest of lobes are clear and equal. Increase shortness of breath when walking hallways. Ambulated x2 on shift, second ambulation only made it half the distance of his first ambulation.   GI:  WNL  :  WNL  Skin Issues:  Continued Psoriasis issues. Skin is peeling and red mostly in hands.   ABX: Discharge home with antibiotic and steroid. Transitioning antibiotic to oral once ome.   Patient is independent ambulating and knows his limits with shortness of breaths. Understands when to take breaks. He is clear with O2 sats between 88-89% in room air, will drop to 85% for 15 seconds after walking. Then will recover back to 88%. Still has consistent cough that can be productive and nonproductive.     Reviewed discharge plan with MD   Report given to Rosey VARGAS       1/20/2023  0217  Nelli Shelton RN

## 2023-01-21 NOTE — PROGRESS NOTES
Patient has been assessed for Home Oxygen needs. Oxygen readings:    *Pulse oximetry (SpO2) = 92% on room air at rest while awake.    *SpO2 improved to 94% on 1liters/minute at rest.    *SpO2 = 88-90% on room air during activity/with exercise.    Did not place pt on oxygen during ambulation as his lowest Sp02 was 88% & recovered to 90-93% after 15-20 seconds when at rest.

## 2023-01-22 LAB
BACTERIA BLD CULT: NO GROWTH
BACTERIA BLD CULT: NO GROWTH

## 2023-01-25 ENCOUNTER — OFFICE VISIT (OUTPATIENT)
Dept: FAMILY MEDICINE | Facility: OTHER | Age: 73
End: 2023-01-25
Attending: STUDENT IN AN ORGANIZED HEALTH CARE EDUCATION/TRAINING PROGRAM
Payer: COMMERCIAL

## 2023-01-25 VITALS
HEART RATE: 70 BPM | OXYGEN SATURATION: 94 % | SYSTOLIC BLOOD PRESSURE: 150 MMHG | WEIGHT: 154.25 LBS | BODY MASS INDEX: 20.92 KG/M2 | TEMPERATURE: 97.6 F | DIASTOLIC BLOOD PRESSURE: 66 MMHG

## 2023-01-25 DIAGNOSIS — Z09 HOSPITAL DISCHARGE FOLLOW-UP: Primary | ICD-10-CM

## 2023-01-25 DIAGNOSIS — J44.9 CHRONIC OBSTRUCTIVE PULMONARY DISEASE, UNSPECIFIED COPD TYPE (H): ICD-10-CM

## 2023-01-25 DIAGNOSIS — I25.10 CORONARY ARTERY DISEASE INVOLVING NATIVE CORONARY ARTERY OF NATIVE HEART WITHOUT ANGINA PECTORIS: ICD-10-CM

## 2023-01-25 DIAGNOSIS — H91.13 PRESBYCUSIS OF BOTH EARS: ICD-10-CM

## 2023-01-25 DIAGNOSIS — I50.32 CHRONIC DIASTOLIC CONGESTIVE HEART FAILURE (H): ICD-10-CM

## 2023-01-25 DIAGNOSIS — E78.5 DYSLIPIDEMIA: ICD-10-CM

## 2023-01-25 DIAGNOSIS — J18.9 PNEUMONIA OF LEFT LOWER LOBE DUE TO INFECTIOUS ORGANISM: ICD-10-CM

## 2023-01-25 DIAGNOSIS — L40.9 PSORIASIS: ICD-10-CM

## 2023-01-25 DIAGNOSIS — Z87.891 FORMER SMOKER: ICD-10-CM

## 2023-01-25 DIAGNOSIS — H93.13 TINNITUS, BILATERAL: ICD-10-CM

## 2023-01-25 DIAGNOSIS — E11.9 TYPE 2 DIABETES MELLITUS WITHOUT COMPLICATION, WITHOUT LONG-TERM CURRENT USE OF INSULIN (H): ICD-10-CM

## 2023-01-25 DIAGNOSIS — I10 ESSENTIAL (PRIMARY) HYPERTENSION: ICD-10-CM

## 2023-01-25 PROBLEM — R09.89 ABDOMINAL BRUIT: Status: ACTIVE | Noted: 2022-11-07

## 2023-01-25 PROBLEM — F17.210 TOBACCO DEPENDENCE DUE TO CIGARETTES: Status: ACTIVE | Noted: 2022-04-05

## 2023-01-25 PROCEDURE — G0463 HOSPITAL OUTPT CLINIC VISIT: HCPCS

## 2023-01-25 PROCEDURE — 99204 OFFICE O/P NEW MOD 45 MIN: CPT | Performed by: STUDENT IN AN ORGANIZED HEALTH CARE EDUCATION/TRAINING PROGRAM

## 2023-01-25 PROCEDURE — G0463 HOSPITAL OUTPT CLINIC VISIT: HCPCS | Mod: 25

## 2023-01-25 RX ORDER — CARVEDILOL 25 MG/1
25 TABLET ORAL 2 TIMES DAILY WITH MEALS
COMMUNITY
End: 2024-01-12

## 2023-01-25 RX ORDER — MOMETASONE FUROATE 1 MG/G
OINTMENT TOPICAL
Qty: 45 G | Refills: 1 | Status: SHIPPED | OUTPATIENT
Start: 2023-01-25 | End: 2023-04-24

## 2023-01-25 ASSESSMENT — PAIN SCALES - GENERAL: PAINLEVEL: NO PAIN (0)

## 2023-01-25 NOTE — PROGRESS NOTES
Assessment & Plan     Hospital discharge follow-up  - Hospitalized 1/16 through 1/20 for pneumonia/COPD exacerbation  - No current PCP  - Will schedule establish care visit    Pneumonia of left lower lobe due to infectious organism  Chronic obstructive pulmonary disease, unspecified type  Former smoker  Recent pneumonia complicated by COPD exacerbation with significant interval improvement.  Mild ongoing SOB and lingering cough.  Quit smoking after his MI but likely fairly significant underlying lung disease.  Suspect there is room for optimization of maintenance regimen and will follow-up to further address.  - Consider pulmonology referral/PFTs  - Continue DuoNeb  - Follow-up to discuss maintenance regimen  -Reviewed S/S that warrant reevaluation    Coronary artery disease involving native coronary artery of native heart without angina pectoris; Hx of STEMI s/p SHER x1 4/2022.  Chronic diastolic congestive heart failure (H)  Follows with [facility] cardiology [place].  Last follow-up 11/7/2022.  Clinically stable from coronary ischemia perspective on medical therapy.  Unable to locate echo report.  - Follow-up to work on optimization of lipids, diabetes, hypertension  - ASA 81 mg  - Atorvastatin 80 mg  - Carvedilol 25 mg twice daily  - Plavix and 5 mg  - Nitro Sublingual tab  - Cardiology follow-up 3/2023    Essential (primary) hypertension  Suboptimal control per chart review.  Most recently was switched from atenolol to carvedilol with cardiology.  Does not check BP at home.  - Recommended starting home BP checks 2-3 times per week  - Coreg 25 mg twice daily  - Tentative plan to start ACE/ARB at follow-up if BP remains elevated    Type 2 diabetes mellitus without complication, without long-term current use of insulin (H)  Last A1c 6.7 on 4/5/2022.  - Metformin  - Follow-up for problem focused visit, A1c, BMP, microalbumin  - ASA, Lipitor  - Likely benefit from ACE/ARB    Dyslipidemia  - Lipitor 80 mg  daily    Psoriasis  Fairly severe psoriasis bilateral hands, less so on his feet, exacerbation after running out of topical steroid several months ago.  Previously followed and diagnosed with dermatology at least 15 years ago.  Resume prior regimen which had kept symptoms well controlled for years.  Do not use high potency steroid for more than 14 consecutive days.  - mometasone (ELOCON) 0.1 % external ointment; Apply topically to hand and feet twice daily and as needed    Presbycusis of both ears  Tinnitus, bilateral  - Adult Audiology  Referral; Future    48 minutes spent on the date of the encounter doing chart review, history and exam, documentation and further activities per the note     MED REC REQUIRED  Post Medication Reconciliation Status:  Discharge medications reconciled and changed, see notes/orders    Follow-up in 1 month for establish care.    Jaime Hebert MD  [facility] - KEVIN Ayala is a 72 year old, presenting for the following health issues:  Hospital F/U      [facility]       Hospital Follow-up Visit:    Hospital/Nursing Home/IP Rehab Facility: [facility]  Date of Admission: 01/16/2023  Date of Discharge: 01/20/2023  Reason(s) for Admission: Pneumonia of left lower lobe due to infectious organism, COPD Exacerbation    Was your hospitalization related to COVID-19? No   Problems taking medications regularly:  None  Medication changes since discharge: Doxycycline 100 mg X 3 days, Prednisone 40 mg X I day  Problems adhering to non-medication therapy:  None    Summary of hospitalization:  [facility] discharge summary reviewed  Diagnostic Tests/Treatments reviewed.  Follow up needed: none  Other Healthcare Providers Involved in Patient s Care:         Cardiology  Update since discharge: improved.     -Continued gradual improvement since discharge  -Completed doxycycline, prednisone  -Still doing DuoNeb 3 times daily, does not have any  maintenance inhalers  -Shortness of breath improved but not completely resolved  -Still has some coarse upper airway sounds at times but no wheezing  -Cough is drastically improved although has become slightly more productive, no blood or purulence in sputum  -No fevers, chills, flulike symptoms  -No chest pain or palpitations with activity    Psoriasis  -Diagnosed with psoriasis about 15 years ago  -Previously followed with dermatology  -Significant exacerbation of symptoms of predominantly hands, somewhat feet over the last several months after running out of topical steroid  -Request refill of mometasone  -Previously had good routine where he would use gloves after steroid, has good insight    Hearing issues  -Longstanding history of diminishing hearing  -Many years of bilateral tinnitus without recent change  -Career working in the mines around loud heavy equipment  -Would like audiology referral    Plan of care communicated with patient           Review of Systems   Constitutional, HEENT, cardiovascular, pulmonary, gi and gu systems are negative, except as otherwise noted.      Objective    BP (!) 150/66 (BP Location: Left arm, Patient Position: Chair, Cuff Size: Adult Regular)   Pulse 70   Temp 97.6  F (36.4  C) (Tympanic)   Wt 70 kg (154 lb 4 oz)   SpO2 94%   BMI 20.92 kg/m    Body mass index is 20.92 kg/m .  Physical Exam  Constitutional:       General: He is not in acute distress.     Appearance: Normal appearance. He is not ill-appearing or toxic-appearing.   HENT:      Head: Normocephalic and atraumatic.      Right Ear: Tympanic membrane, ear canal and external ear normal.      Left Ear: Tympanic membrane, ear canal and external ear normal.      Mouth/Throat:      Mouth: Mucous membranes are moist.      Pharynx: No oropharyngeal exudate.   Eyes:      General: No scleral icterus.     Pupils: Pupils are equal, round, and reactive to light.   Cardiovascular:      Rate and Rhythm: Normal rate and regular  rhythm.      Heart sounds: No murmur heard.  Pulmonary:      Effort: Pulmonary effort is normal. No respiratory distress.      Breath sounds: No stridor. No wheezing or rhonchi.      Comments: Globally diminished but clear breath sounds heard throughout all lung fields, occasional accessory upper airway sounds  Abdominal:      General: Abdomen is flat.      Palpations: Abdomen is soft.   Musculoskeletal:         General: Normal range of motion.      Cervical back: Normal range of motion.      Right lower leg: No edema.      Left lower leg: No edema.   Lymphadenopathy:      Cervical: No cervical adenopathy.   Skin:     General: Skin is warm and dry.   Neurological:      General: No focal deficit present.      Mental Status: He is alert and oriented to person, place, and time.   Psychiatric:         Mood and Affect: Mood normal.         Behavior: Behavior normal.

## 2023-01-27 ENCOUNTER — TELEPHONE (OUTPATIENT)
Dept: AUDIOLOGY | Facility: OTHER | Age: 73
End: 2023-01-27

## 2023-01-27 NOTE — TELEPHONE ENCOUNTER
Patient was called 3 times and voicemails left to get appt with audiology scheduled from referral.  1/27-letter was sent to patient to call and schedule.      Carly Godoy

## 2023-02-13 ENCOUNTER — OFFICE VISIT (OUTPATIENT)
Dept: FAMILY MEDICINE | Facility: OTHER | Age: 73
End: 2023-02-13
Attending: STUDENT IN AN ORGANIZED HEALTH CARE EDUCATION/TRAINING PROGRAM
Payer: COMMERCIAL

## 2023-02-13 VITALS
BODY MASS INDEX: 21.81 KG/M2 | OXYGEN SATURATION: 96 % | HEART RATE: 67 BPM | TEMPERATURE: 97.9 F | DIASTOLIC BLOOD PRESSURE: 90 MMHG | HEIGHT: 72 IN | RESPIRATION RATE: 16 BRPM | WEIGHT: 161 LBS | SYSTOLIC BLOOD PRESSURE: 176 MMHG

## 2023-02-13 DIAGNOSIS — L40.9 PSORIASIS: ICD-10-CM

## 2023-02-13 DIAGNOSIS — I10 ESSENTIAL (PRIMARY) HYPERTENSION: Primary | ICD-10-CM

## 2023-02-13 PROBLEM — J18.9 PNEUMONIA OF LEFT LOWER LOBE DUE TO INFECTIOUS ORGANISM: Status: RESOLVED | Noted: 2023-01-16 | Resolved: 2023-02-13

## 2023-02-13 PROBLEM — J96.01 ACUTE RESPIRATORY FAILURE WITH HYPOXIA (H): Status: RESOLVED | Noted: 2023-01-16 | Resolved: 2023-02-13

## 2023-02-13 PROCEDURE — G0463 HOSPITAL OUTPT CLINIC VISIT: HCPCS

## 2023-02-13 PROCEDURE — 99214 OFFICE O/P EST MOD 30 MIN: CPT | Performed by: STUDENT IN AN ORGANIZED HEALTH CARE EDUCATION/TRAINING PROGRAM

## 2023-02-13 RX ORDER — LISINOPRIL 10 MG/1
10 TABLET ORAL DAILY
Qty: 60 TABLET | Refills: 0 | Status: SHIPPED | OUTPATIENT
Start: 2023-02-13 | End: 2024-01-12

## 2023-02-13 ASSESSMENT — PAIN SCALES - GENERAL: PAINLEVEL: NO PAIN (0)

## 2023-02-13 NOTE — PROGRESS NOTES
Assessment & Plan     Essential (primary) hypertension  Uncontrolled.  Asymptomatic. Add ACEi with close follow-up for further titration.  Discussed risk/benefits of uncontrolled hypertension and treatment.  - lisinopril (ZESTRIL) 10 MG tablet; Take 1 tablet (10 mg) by mouth daily  - Coreg 25 mg twice daily  - BMP at follow-up in 3 weeks    Psoriasis  Significant interval improvement of fairly severe psoriasis of bilateral hands with 2-week course of mometasone.  Currently on drug holiday and discussed concern for rebound symptoms.  May use mometasone for spot treatment as needed no greater than 1 week but also discussed tentative plan to have a lower potency steroid available now that overall disease state is better controlled.  - Mometasone 0.1%; do not use more than 7 consecutive days  - Consider trial of triamcinolone 0.1% lotion for spot treatment/maintenance therapy    35 minutes spent on the date of the encounter doing chart review, history and exam, documentation and further activities per the note      Follow-up in 3 weeks.    Jaime Hebert MD  [facility] - KEVIN Ayala is a 72 year old male presenting for the following health issues:  Hypertension      HPI       Hypertension  -Had recently switched from atenolol to carvedilol with subsequent elevation in BP  -Not checking BP at home  -No headache, dizziness, lightheadedness, chest pain, palpitations, shortness of breath, peripheral edema  -Respiratory status continues to improve since his hospitalization  -Next cardiology appointment 3/28 with [facility]    Psoriasis  -Refilled mometasone 1/25/2023  -Drastic improvement of dermatitis of bilateral hands; essentially resolved  -Use mometasone for 2 weeks then discontinued  -Typically gets some rebound flare but has not had that yet  -Discussed using lower potency steroid cream once symptoms get under control    Review of Systems   Constitutional, HEENT,  cardiovascular, pulmonary, gi and gu systems are negative, except as otherwise noted.      Objective    BP (!) 178/94 (BP Location: Left arm, Patient Position: Sitting, Cuff Size: Adult Regular)   Pulse 67   Temp 97.9  F (36.6  C) (Tympanic)   Resp 16   Ht 1.829 m (6')   Wt 73 kg (161 lb)   SpO2 96%   BMI 21.84 kg/m    Body mass index is 21.84 kg/m .  Physical Exam   GENERAL: healthy, alert and no distress  RESP: Globally diminished but clear breath sounds throughout all lung fields- no rales, rhonchi or wheezes  CV: regular rate and rhythm, normal S1 S2, no S3 or S4, no murmur, click or rub, no peripheral edema and peripheral pulses strong  MS: no gross musculoskeletal defects noted, no edema  SKIN: Drastic improvement of dermatitis bilateral hands, some residual light pink tones of fingers but no longer having scaly/flaky crusting  NEURO: Normal strength and tone, mentation intact and speech normal  PSYCH: mentation appears normal, affect normal/bright

## 2023-03-03 NOTE — PROGRESS NOTES
Assessment & Plan     Essential (primary) hypertension  At goal after addition of lisinopril based on daily home BP monitoring and consistent with clinic reading.  Denies side effects.  BMP pending.  Continue current regimen.  - Basic metabolic panel; Future  - Continue daily home BP monitoring  - Lisinopril 10 mg daily  - Coreg 25 mg twice daily  - Follow-up in 1 month    22 minutes spent on the date of the encounter doing chart review, history and exam, documentation and further activities per the note       Follow-up 1 month for repeat HTN check, diabetes visit.    Jaime Hebert MD  [facility] - KEVIN Ayala is a 72 year old, presenting for the following health issues:  Hypertension      HPI     Hypertension Follow-up    Do you check your blood pressure regularly outside of the clinic? Yes     Are you following a low salt diet? Yes    Are your blood pressures ever more than 140 on the top number (systolic) OR more   than 90 on the bottom number (diastolic), for example 140/90? No     -Last seen 2/13/2023; started on lisinopril 10 mg daily for uncontrolled hypertension  -Excellent results since that time  -Checking BP twice daily at home; average SBP 1 10-1 30  -Average DBP 60-70  -Denies headache, dizziness, lightheadedness, chest pain, palpitations, shortness of breath, peripheral edema  -Denies cough  -Feeling well    BP Readings from Last 2 Encounters:   03/07/23 120/70   02/13/23 (!) 176/90     No results found for: A1C, LDL            Review of Systems   Constitutional, HEENT, cardiovascular, pulmonary, gi and gu systems are negative, except as otherwise noted.      Objective    /70 (BP Location: Left arm, Patient Position: Sitting, Cuff Size: Adult Regular)   Pulse 66   Temp (!) 96.4  F (35.8  C) (Tympanic)   Resp 16   Wt 74.4 kg (164 lb)   SpO2 95%   BMI 22.24 kg/m    Body mass index is 22.24 kg/m .  Physical Exam  Vitals reviewed.   Constitutional:        Appearance: Normal appearance.   HENT:      Head: Normocephalic and atraumatic.   Cardiovascular:      Rate and Rhythm: Normal rate and regular rhythm.      Heart sounds: No murmur heard.  Pulmonary:      Effort: Pulmonary effort is normal.      Breath sounds: Normal breath sounds. No stridor. No wheezing, rhonchi or rales.      Comments: Globally diminished breath sounds but present throughout.  Musculoskeletal:         General: Normal range of motion.      Right lower leg: No edema.      Left lower leg: No edema.   Skin:     General: Skin is warm and dry.   Neurological:      General: No focal deficit present.      Mental Status: He is alert and oriented to person, place, and time.   Psychiatric:         Mood and Affect: Mood normal.         Behavior: Behavior normal.

## 2023-03-06 ENCOUNTER — OFFICE VISIT (OUTPATIENT)
Dept: AUDIOLOGY | Facility: OTHER | Age: 73
End: 2023-03-06
Attending: AUDIOLOGIST
Payer: COMMERCIAL

## 2023-03-06 DIAGNOSIS — H93.13 TINNITUS, BILATERAL: ICD-10-CM

## 2023-03-06 DIAGNOSIS — H61.21 IMPACTED CERUMEN OF RIGHT EAR: Primary | ICD-10-CM

## 2023-03-06 DIAGNOSIS — H91.13 PRESBYCUSIS OF BOTH EARS: ICD-10-CM

## 2023-03-06 NOTE — PROGRESS NOTES
Background: Patient scheduled for hearing evaluation.    Results: Otoscopy shows right canal cerumen impaction and clear left canal. Collapsing canals.    Recommendations: Hearing evaluation pending cerumen management and appointment has been scheduled.    He will see ENT first.     Elizabeth Harvey M.S., [facility]  Audiologist #[id number]

## 2023-03-07 ENCOUNTER — LAB (OUTPATIENT)
Dept: LAB | Facility: OTHER | Age: 73
End: 2023-03-07
Attending: STUDENT IN AN ORGANIZED HEALTH CARE EDUCATION/TRAINING PROGRAM
Payer: COMMERCIAL

## 2023-03-07 ENCOUNTER — OFFICE VISIT (OUTPATIENT)
Dept: FAMILY MEDICINE | Facility: OTHER | Age: 73
End: 2023-03-07
Attending: STUDENT IN AN ORGANIZED HEALTH CARE EDUCATION/TRAINING PROGRAM
Payer: COMMERCIAL

## 2023-03-07 VITALS
BODY MASS INDEX: 22.24 KG/M2 | WEIGHT: 164 LBS | DIASTOLIC BLOOD PRESSURE: 70 MMHG | RESPIRATION RATE: 16 BRPM | SYSTOLIC BLOOD PRESSURE: 120 MMHG | HEART RATE: 66 BPM | OXYGEN SATURATION: 95 % | TEMPERATURE: 96.4 F

## 2023-03-07 DIAGNOSIS — I10 ESSENTIAL (PRIMARY) HYPERTENSION: Primary | ICD-10-CM

## 2023-03-07 DIAGNOSIS — I10 ESSENTIAL (PRIMARY) HYPERTENSION: ICD-10-CM

## 2023-03-07 LAB
ANION GAP SERPL CALCULATED.3IONS-SCNC: 8 MMOL/L (ref 7–15)
BUN SERPL-MCNC: 18.2 MG/DL (ref 8–23)
CALCIUM SERPL-MCNC: 9.3 MG/DL (ref 8.8–10.2)
CHLORIDE SERPL-SCNC: 104 MMOL/L (ref 98–107)
CREAT SERPL-MCNC: 1.08 MG/DL (ref 0.67–1.17)
DEPRECATED HCO3 PLAS-SCNC: 28 MMOL/L (ref 22–29)
GFR SERPL CREATININE-BSD FRML MDRD: 73 ML/MIN/1.73M2
GLUCOSE SERPL-MCNC: 134 MG/DL (ref 70–99)
HOLD SPECIMEN: NORMAL
POTASSIUM SERPL-SCNC: 4.4 MMOL/L (ref 3.4–5.3)
SODIUM SERPL-SCNC: 140 MMOL/L (ref 136–145)

## 2023-03-07 PROCEDURE — 36415 COLL VENOUS BLD VENIPUNCTURE: CPT | Mod: ZL

## 2023-03-07 PROCEDURE — 99213 OFFICE O/P EST LOW 20 MIN: CPT | Performed by: STUDENT IN AN ORGANIZED HEALTH CARE EDUCATION/TRAINING PROGRAM

## 2023-03-07 PROCEDURE — 80048 BASIC METABOLIC PNL TOTAL CA: CPT | Mod: ZL

## 2023-03-07 PROCEDURE — G0463 HOSPITAL OUTPT CLINIC VISIT: HCPCS

## 2023-03-07 ASSESSMENT — PAIN SCALES - GENERAL: PAINLEVEL: NO PAIN (0)

## 2023-03-17 NOTE — PROGRESS NOTES
Otolaryngology Consultation    Patient: Jamie Chu  : 1950    Chief Complaint   Patient presents with     Consult     Referred by Elizabeth Harvey for an ear cleaning       HPI:  Jamie Chu is a 72 year old male seen today for Right ear cleaning.  Patient is seen on 3/6/2023 for audiogram upon examination he was noted to have right cerumen impaction and clear canal on left.  He was recommended to complete ear cleaning prior to audiology testing.  He reports an ear flushing this past winter.   He feels his hearing is fairly well but worse related to the tinnitus.         Denies COM or otologic surgeries.   Denies otalgia, otorrhea  + Bilateral tinnitus  Denies fluctuating hearing loss or tinnitus.   Denies vertigo or facial paraesthesia.   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   No known family hx of otologic concerns.     Audiogram- upcoming.     Current Outpatient Rx   Medication Sig Dispense Refill     aspirin (ASA) 81 MG chewable tablet 81 mg daily       atorvastatin (LIPITOR) 80 MG tablet Take 80 mg by mouth daily       carvedilol (COREG) 25 MG tablet Take 25 mg by mouth 2 times daily (with meals)       clopidogrel (PLAVIX) 75 MG tablet TAKE FOUR TABLETS BY MOUTH FOR THE FIRST DAY, THEN 1 TABLET DAILY THEREAFTER       ipratropium - albuterol 0.5 mg/2.5 mg/3 mL (DUONEB) 0.5-2.5 (3) MG/3ML neb solution Take 1 vial (3 mLs) by nebulization every 6 hours 90 mL 3     lisinopril (ZESTRIL) 10 MG tablet Take 1 tablet (10 mg) by mouth daily 60 tablet 0     metFORMIN (GLUCOPHAGE) 500 MG tablet Take by mouth 2 times daily (with meals)       mometasone (ELOCON) 0.1 % external ointment Apply topically to hand and feet twice daily and as needed 45 g 1     nitroGLYcerin (NITROSTAT) 0.4 MG sublingual tablet Place 0.4 mg under the tongue every 5 minutes as needed (Patient not taking: Reported on 2023)         Allergies: Patient has no known allergies.     Past Medical History:   Diagnosis Date     Acute  respiratory failure with hypoxia (H) 2023     Pneumonia of left lower lobe due to infectious organism 2023       No past surgical history on file.    ENT family history reviewed    Social History     Tobacco Use     Smoking status: Former     Packs/day: 0.50     Years: 57.00     Pack years: 28.50     Types: Cigarettes     Start date:      Quit date: 2022     Years since quittin.9     Smokeless tobacco: Never   Vaping Use     Vaping Use: Never used       Review of Systems  ROS: 10 point ROS neg other than the symptoms noted above in the HPI     Physical Exam  BP (!) 148/80 (BP Location: Left arm, Cuff Size: Adult Regular)   Pulse 68   Temp 96.9  F (36.1  C) (Tympanic)   Ht 1.829 m (6')   Wt 74.4 kg (164 lb)   SpO2 98%   BMI 22.24 kg/m    General - The patient is well nourished and well developed, and appears to have good nutritional status.  Alert and oriented to person and place, answers questions and cooperates with examination appropriately.   Head and Face - Normocephalic and atraumatic, with no gross asymmetry noted.  The facial nerve is intact, with strong symmetric movements.  Voice and Breathing - The patient was breathing comfortably without the use of accessory muscles. There was no wheezing, stridor, or stertor.  The patients voice was clear and strong, and had appropriate pitch and quality.      On examination of the ears, I found that the ear(s) were completely impacted with dense cerumen.  Therefore, I positioned them in the examination chair in a semi-supine position, beginning with the right side.  I used the binocular surgical microscope to perform cerumen removal.  I began by using a cerumen loop to gently lift the edges of the cerumen mass away from the walls of the external canal.  Once I did this, I was able to suction away fragments of wax and debris using suction.  Once the mass was loose enough, the entire plug was pulled from the canal.  The tympanic membrane was  intact, no sign of perforation or middle ear effusion.    I turned my attention to the contralateral side once again using the binocular surgical microscope to examine, The tympanic membrane was intact, no sign of perforation or middle ear effusion.    Eyes - Extraocular movements intact, and the pupils were reactive to light.  Sclera were not icteric or injected, conjunctiva were pink and moist.  Mouth - Examination of the oral cavity showed pink, healthy oral mucosa. No lesions or ulcerations noted.  The tongue was mobile and midline, and the dentition were in good condition.    Throat - The walls of the oropharynx were smooth, pink, moist, symmetric, and had no lesions or ulcerations.  The tonsillar pillars and soft palate were symmetric.  The uvula was midline on elevation.    Neck - Normal midline excursion of the laryngotracheal complex during swallowing.  Full range of motion on passive movement.  Palpation of the occipital, submental, submandibular, internal jugular chain, and supraclavicular nodes did not demonstrate any abnormal lymph nodes or masses.  Palpation of the thyroid was soft and smooth, with no nodules or goiter appreciated.  The trachea was mobile and midline.  Nose - External contour is symmetric, no gross deflection or scars.  Nasal mucosa is pink and moist with no abnormal mucus.       Impression and Plan- Jamie Chu is a 72 year old male with:    ICD-10-CM    1. Impacted cerumen of right ear  H61.21 Adult ENT  Referral      2. Tinnitus, bilateral  H93.13       3. Bilateral hearing loss, unspecified hearing loss type  H91.93           Right ear was cleaned.  He does have left psoriasis on his distal canal and recommended use mometasone which she has at home.  Instructions were provided for Jamie.  Reassured stable ear exam there is no effusion or retraction.   Complete audiogram.   Consider HAC     Hearing protection  Tinnitus reviewed with patient today.      Tinnitus  education was provided.  Tinnitus is widely considered a disorder of cental auditory processing.     Hearing preservation was reinforced   I also cautioned the patient against investing in any oral supplements advertised to cure tinnitus.     I have also recommended yearly audiograms, masking devices or apps.  For worsening symptoms, I recommend online or in person cognitive behavioral therapy (CBT) referral.     The patient will follow up as necessary for worsening symptoms or changes in symptoms.         Lanette Barahona PA-C  ENT  [facility], [place]

## 2023-03-20 ENCOUNTER — OFFICE VISIT (OUTPATIENT)
Dept: OTOLARYNGOLOGY | Facility: OTHER | Age: 73
End: 2023-03-20
Attending: AUDIOLOGIST
Payer: COMMERCIAL

## 2023-03-20 VITALS
DIASTOLIC BLOOD PRESSURE: 80 MMHG | BODY MASS INDEX: 22.21 KG/M2 | SYSTOLIC BLOOD PRESSURE: 148 MMHG | OXYGEN SATURATION: 98 % | HEART RATE: 68 BPM | WEIGHT: 164 LBS | TEMPERATURE: 96.9 F | HEIGHT: 72 IN

## 2023-03-20 DIAGNOSIS — H93.13 TINNITUS, BILATERAL: ICD-10-CM

## 2023-03-20 DIAGNOSIS — H61.21 IMPACTED CERUMEN OF RIGHT EAR: Primary | ICD-10-CM

## 2023-03-20 DIAGNOSIS — H91.93 BILATERAL HEARING LOSS, UNSPECIFIED HEARING LOSS TYPE: ICD-10-CM

## 2023-03-20 PROCEDURE — 69210 REMOVE IMPACTED EAR WAX UNI: CPT | Mod: RT | Performed by: PHYSICIAN ASSISTANT

## 2023-03-20 PROCEDURE — 92504 EAR MICROSCOPY EXAMINATION: CPT | Performed by: PHYSICIAN ASSISTANT

## 2023-03-20 PROCEDURE — G0463 HOSPITAL OUTPT CLINIC VISIT: HCPCS

## 2023-03-20 PROCEDURE — 99213 OFFICE O/P EST LOW 20 MIN: CPT | Mod: 25 | Performed by: PHYSICIAN ASSISTANT

## 2023-03-20 ASSESSMENT — PAIN SCALES - GENERAL: PAINLEVEL: NO PAIN (0)

## 2023-03-20 NOTE — LETTER
3/20/2023         RE: Jamie Cuh  [address] [facility] [address]        Dear Colleague,    Thank you for referring your patient, Jamie Chu, to the [facility]. Please see a copy of my visit note below.    Otolaryngology Consultation    Patient: Jamie Chu  : 1950    Chief Complaint   Patient presents with     Consult     Referred by Elizabeth Harvey for an ear cleaning       HPI:  Jamie Chu is a 72 year old male seen today for Right ear cleaning.  Patient is seen on 3/6/2023 for audiogram upon examination he was noted to have right cerumen impaction and clear canal on left.  He was recommended to complete ear cleaning prior to audiology testing.  He reports an ear flushing this past winter.   He feels his hearing is fairly well but worse related to the tinnitus.         Denies COM or otologic surgeries.   Denies otalgia, otorrhea  + Bilateral tinnitus  Denies fluctuating hearing loss or tinnitus.   Denies vertigo or facial paraesthesia.   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   No known family hx of otologic concerns.     Audiogram- upcoming.     Current Outpatient Rx   Medication Sig Dispense Refill     aspirin (ASA) 81 MG chewable tablet 81 mg daily       atorvastatin (LIPITOR) 80 MG tablet Take 80 mg by mouth daily       carvedilol (COREG) 25 MG tablet Take 25 mg by mouth 2 times daily (with meals)       clopidogrel (PLAVIX) 75 MG tablet TAKE FOUR TABLETS BY MOUTH FOR THE FIRST DAY, THEN 1 TABLET DAILY THEREAFTER       ipratropium - albuterol 0.5 mg/2.5 mg/3 mL (DUONEB) 0.5-2.5 (3) MG/3ML neb solution Take 1 vial (3 mLs) by nebulization every 6 hours 90 mL 3     lisinopril (ZESTRIL) 10 MG tablet Take 1 tablet (10 mg) by mouth daily 60 tablet 0     metFORMIN (GLUCOPHAGE) 500 MG tablet Take by mouth 2 times daily (with meals)       mometasone (ELOCON) 0.1 % external ointment Apply topically to hand and feet twice daily and as needed 45 g 1     nitroGLYcerin  (NITROSTAT) 0.4 MG sublingual tablet Place 0.4 mg under the tongue every 5 minutes as needed (Patient not taking: Reported on 2023)         Allergies: Patient has no known allergies.     Past Medical History:   Diagnosis Date     Acute respiratory failure with hypoxia (H) 2023     Pneumonia of left lower lobe due to infectious organism 2023       No past surgical history on file.    ENT family history reviewed    Social History     Tobacco Use     Smoking status: Former     Packs/day: 0.50     Years: 57.00     Pack years: 28.50     Types: Cigarettes     Start date:      Quit date: 2022     Years since quittin.9     Smokeless tobacco: Never   Vaping Use     Vaping Use: Never used       Review of Systems  ROS: 10 point ROS neg other than the symptoms noted above in the HPI     Physical Exam  BP (!) 148/80 (BP Location: Left arm, Cuff Size: Adult Regular)   Pulse 68   Temp 96.9  F (36.1  C) (Tympanic)   Ht 1.829 m (6')   Wt 74.4 kg (164 lb)   SpO2 98%   BMI 22.24 kg/m    General - The patient is well nourished and well developed, and appears to have good nutritional status.  Alert and oriented to person and place, answers questions and cooperates with examination appropriately.   Head and Face - Normocephalic and atraumatic, with no gross asymmetry noted.  The facial nerve is intact, with strong symmetric movements.  Voice and Breathing - The patient was breathing comfortably without the use of accessory muscles. There was no wheezing, stridor, or stertor.  The patients voice was clear and strong, and had appropriate pitch and quality.      On examination of the ears, I found that the ear(s) were completely impacted with dense cerumen.  Therefore, I positioned them in the examination chair in a semi-supine position, beginning with the right side.  I used the binocular surgical microscope to perform cerumen removal.  I began by using a cerumen loop to gently lift the edges of the cerumen  mass away from the walls of the external canal.  Once I did this, I was able to suction away fragments of wax and debris using suction.  Once the mass was loose enough, the entire plug was pulled from the canal.  The tympanic membrane was intact, no sign of perforation or middle ear effusion.    I turned my attention to the contralateral side once again using the binocular surgical microscope to examine, The tympanic membrane was intact, no sign of perforation or middle ear effusion.    Eyes - Extraocular movements intact, and the pupils were reactive to light.  Sclera were not icteric or injected, conjunctiva were pink and moist.  Mouth - Examination of the oral cavity showed pink, healthy oral mucosa. No lesions or ulcerations noted.  The tongue was mobile and midline, and the dentition were in good condition.    Throat - The walls of the oropharynx were smooth, pink, moist, symmetric, and had no lesions or ulcerations.  The tonsillar pillars and soft palate were symmetric.  The uvula was midline on elevation.    Neck - Normal midline excursion of the laryngotracheal complex during swallowing.  Full range of motion on passive movement.  Palpation of the occipital, submental, submandibular, internal jugular chain, and supraclavicular nodes did not demonstrate any abnormal lymph nodes or masses.  Palpation of the thyroid was soft and smooth, with no nodules or goiter appreciated.  The trachea was mobile and midline.  Nose - External contour is symmetric, no gross deflection or scars.  Nasal mucosa is pink and moist with no abnormal mucus.       Impression and Plan- Jamie Chu is a 72 year old male with:    ICD-10-CM    1. Impacted cerumen of right ear  H61.21 Adult ENT  Referral      2. Tinnitus, bilateral  H93.13       3. Bilateral hearing loss, unspecified hearing loss type  H91.93           Right ear was cleaned.  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  Instructions were provided for Jamie.  Reassured stable ear exam there is no effusion or retraction.   Complete audiogram.   Consider [facility]     Hearing protection  Tinnitus reviewed with patient today.      Tinnitus education was provided.  Tinnitus is widely considered a disorder of cental auditory processing.     Hearing preservation was reinforced   I also cautioned the patient against investing in any oral supplements advertised to cure tinnitus.     I have also recommended yearly audiograms, masking devices or apps.  For worsening symptoms, I recommend online or in person cognitive behavioral therapy (CBT) referral.     The patient will follow up as necessary for worsening symptoms or changes in symptoms.         Lanette Barahona PA-C  ENT  [facility], [place]          Again, thank you for allowing me to participate in the care of your patient.        Sincerely,        Lanette Barahona PA-C

## 2023-03-20 NOTE — PATIENT INSTRUCTIONS
Right ear was cleaned  Normal ear exam. No fluid or infection  Use mometasone to outer ear 1-2 times daily for 7 days, then use as needed at bedtime.   Complete audiogram.       Thank you for allowing Lanette Barahona PA-C and our ENT team to participate in your care.  If your medications are too expensive, please give the nurse a call.  We can possibly change this medication.  If you have a scheduling or an appointment question please contact our Health Unit Coordinator at [phone], Ext. [phone].    ALL nursing questions or concerns can be directed to your ENT nurse at: [phone] [facility]      Tinnitus education was provided.  Tinnitus is widely considered a disorder of cental auditory processing.     Hearing preservation was reinforced   I also cautioned the patient against investing in any oral supplements advertised to cure tinnitus.     I have also recommended yearly audiograms, masking devices or apps.  For worsening symptoms, I recommend online or in person cognitive behavioral therapy (CBT) referral.     The patient will follow up as necessary for worsening symptoms or changes in symptoms.

## 2023-03-23 DIAGNOSIS — H91.93 DECREASED HEARING OF BOTH EARS: Primary | ICD-10-CM

## 2023-03-28 ENCOUNTER — TRANSFERRED RECORDS (OUTPATIENT)
Dept: HEALTH INFORMATION MANAGEMENT | Facility: HOSPITAL | Age: 73
End: 2023-03-28

## 2023-03-28 LAB
ALBUMIN (EXTERNAL): 4.2 G/DL (ref 3.5–5)
ALKALINE PHOSPHATASE (EXTERNAL): 118 IU/L (ref 40–150)
ALT SERPL-CCNC: 28 IU/L (ref 6–40)
ANION GAP SERPL CALC-SCNC: 11 MEQ/L (ref 3–15)
AST SERPL-CCNC: 14 IU/L (ref 10–40)
BILIRUB SERPL-MCNC: 0.8 MG/DL (ref 0.2–1.2)
BUN SERPL-MCNC: 24 MG/DL (ref 5–24)
CALCIUM (EXTERNAL): 9.5 MG/DL (ref 8.4–10.5)
CHLORIDE (EXTERNAL): 105 MEQ/L (ref 99–110)
CHOLESTEROL (EXTERNAL): 92 MG/DL (ref 0–200)
CO2 (EXTERNAL): 23 MEQ/L (ref 19–29)
CREATININE (EXTERNAL): 1.18 MG/DL (ref 0.7–1.2)
GFR ESTIMATED (EXTERNAL): 66 ML/MIN/1.73M2
GFR ESTIMATED (IF AFRICAN AMERICAN) (EXTERNAL): ABNORMAL ML/MIN/1.73M2
GLUCOSE (EXTERNAL): 187 MG/DL (ref 70–99)
HBA1C MFR BLD: 6.7 % (ref 4–5.6)
HDLC SERPL-MCNC: 39 MG/DL
LDL CHOLESTEROL CALCULATED (EXTERNAL): 32 MG/DL (ref 0–100)
NON HDL CHOLESTEROL (EXTERNAL): 53 MG/DL (ref 0–130)
POTASSIUM (EXTERNAL): 4.5 MEQ/L (ref 3.4–5.1)
PROTEIN TOTAL (EXTERNAL): 6.7 G/DL (ref 6–8)
SODIUM (EXTERNAL): 139 MEQ/L (ref 134–143)
TRIGLYCERIDES (EXTERNAL): 104 MG/DL

## 2023-03-30 ENCOUNTER — OFFICE VISIT (OUTPATIENT)
Dept: AUDIOLOGY | Facility: OTHER | Age: 73
End: 2023-03-30
Attending: AUDIOLOGIST
Payer: COMMERCIAL

## 2023-03-30 DIAGNOSIS — H93.13 TINNITUS, BILATERAL: ICD-10-CM

## 2023-03-30 DIAGNOSIS — H91.93 DECREASED HEARING OF BOTH EARS: ICD-10-CM

## 2023-03-30 DIAGNOSIS — H90.3 SENSORINEURAL HEARING LOSS, ASYMMETRICAL: Primary | ICD-10-CM

## 2023-03-30 PROCEDURE — 92550 TYMPANOMETRY & REFLEX THRESH: CPT | Performed by: AUDIOLOGIST

## 2023-03-30 PROCEDURE — 92557 COMPREHENSIVE HEARING TEST: CPT | Performed by: AUDIOLOGIST

## 2023-03-30 NOTE — PROGRESS NOTES
Audiology Evaluation Completed. Please refer SCANNED AUDIOGRAM and/or TYMPANOGRAM for BACKGROUND, RESULTS, RECOMMENDATIONS.      Elizabeth MUJICA, [facility]  Audiologist #[id number]

## 2023-04-06 ENCOUNTER — TELEPHONE (OUTPATIENT)
Dept: OTOLARYNGOLOGY | Facility: OTHER | Age: 73
End: 2023-04-06

## 2023-04-06 NOTE — TELEPHONE ENCOUNTER
I called and left a message x 2 for patient to call back about his audiogram results.  Patient will need an mri iac ordered.

## 2023-04-07 ENCOUNTER — TRANSFERRED RECORDS (OUTPATIENT)
Dept: HEALTH INFORMATION MANAGEMENT | Facility: HOSPITAL | Age: 73
End: 2023-04-07

## 2023-04-24 DIAGNOSIS — L40.9 PSORIASIS: ICD-10-CM

## 2023-04-24 RX ORDER — MOMETASONE FUROATE 1 MG/G
OINTMENT TOPICAL
Qty: 45 G | Refills: 0 | Status: SHIPPED | OUTPATIENT
Start: 2023-04-24 | End: 2023-09-07

## 2023-04-24 NOTE — TELEPHONE ENCOUNTER
ELOCON 0.1%      Last Written Prescription Date:  1/25/2023  Last Fill Quantity: 45g,   # refills: 1  Last Office Visit: 3/7/2023  Future Office visit:       Routing refill request to provider for review/approval because:

## 2023-07-17 ENCOUNTER — TRANSFERRED RECORDS (OUTPATIENT)
Dept: HEALTH INFORMATION MANAGEMENT | Facility: HOSPITAL | Age: 73
End: 2023-07-17

## 2023-07-17 LAB
ANION GAP SERPL CALC-SCNC: 7 MEQ/L (ref 3–15)
BUN SERPL-MCNC: 25 MG/DL (ref 5–24)
CALCIUM (EXTERNAL): 9.4 MG/DL (ref 8.4–10.5)
CHLORIDE (EXTERNAL): 108 MEQ/L (ref 99–110)
CHOLESTEROL (EXTERNAL): 88 MG/DL (ref 0–200)
CO2 (EXTERNAL): 27 MEQ/L (ref 19–29)
CREATININE (EXTERNAL): 1.47 MG/DL (ref 0.7–1.2)
GFR ESTIMATED (EXTERNAL): 51 ML/MIN/1.73M2
GFR ESTIMATED (IF AFRICAN AMERICAN) (EXTERNAL): ABNORMAL ML/MIN/1.73M2
GLUCOSE (EXTERNAL): 112 MG/DL (ref 70–99)
HDLC SERPL-MCNC: 42 MG/DL
LDL CHOLESTEROL CALCULATED (EXTERNAL): 29 MG/DL (ref 0–100)
NON HDL CHOLESTEROL (EXTERNAL): 46 MG/DL (ref 0–130)
POTASSIUM (EXTERNAL): 4.8 MEQ/L (ref 3.4–5.1)
SODIUM (EXTERNAL): 142 MEQ/L (ref 134–143)
TRIGLYCERIDES (EXTERNAL): 87 MG/DL
TSH SERPL-ACNC: 1.06 UIU/ML (ref 0.4–3.99)

## 2023-07-20 ENCOUNTER — TRANSFERRED RECORDS (OUTPATIENT)
Dept: OTHER | Facility: HOSPITAL | Age: 73
End: 2023-07-20

## 2023-09-06 DIAGNOSIS — L40.9 PSORIASIS: ICD-10-CM

## 2023-09-07 RX ORDER — MOMETASONE FUROATE 1 MG/G
OINTMENT TOPICAL
Qty: 45 G | Refills: 0 | Status: SHIPPED | OUTPATIENT
Start: 2023-09-07 | End: 2024-06-14

## 2023-09-07 NOTE — TELEPHONE ENCOUNTER
Mometasone      Last Written Prescription Date:  4/24/23  Last Fill Quantity: 45,   # refills: 0  Last Office Visit: 4/11/23  Future Office visit:

## 2023-12-19 ENCOUNTER — TRANSFERRED RECORDS (OUTPATIENT)
Dept: OTHER | Facility: HOSPITAL | Age: 73
End: 2023-12-19

## 2023-12-19 VITALS
TEMPERATURE: 98.2 F | SYSTOLIC BLOOD PRESSURE: 122 MMHG | OXYGEN SATURATION: 93 % | HEART RATE: 102 BPM | DIASTOLIC BLOOD PRESSURE: 66 MMHG | RESPIRATION RATE: 17 BRPM

## 2023-12-20 ENCOUNTER — TELEPHONE (OUTPATIENT)
Dept: FAMILY MEDICINE | Facility: OTHER | Age: 73
End: 2023-12-20

## 2023-12-20 NOTE — TELEPHONE ENCOUNTER
Attempt # 1  Outcome: Left Message   Comment: LVM to schedule a BP check with a NURSE only per quality list

## 2024-01-09 NOTE — PROGRESS NOTES
Assessment & Plan     Type 2 diabetes mellitus without complication, without long-term current use of insulin (H)  Has been well-controlled on metformin monotherapy.  Not monitoring blood sugars at home.  Update labs today and titrate as indicated.  - Hemoglobin A1c; Future  - Basic metabolic panel; Future  - Albumin Random Urine Quantitative with Creat Ratio; Future  - Metformin 500 mg twice daily  - ASA/Lipitor  - Lisinopril has been discontinued with history of unilateral renal artery stenosis and elevated creatinine  - Due for but diabetic eye exam  - Diabetic foot exam 1/12/2024    Essential (primary) hypertension  Has followed with cardiology, also referred to nephrology after findings of unilateral renal artery stenosis with elevated creatinine.  BP well-controlled lately on current regimen.  - Amlodipine 10 mg twice daily  - Metoprolol XL 50 mg daily  - Looks like he was referred to vascular surgery for further evaluation of right renal artery stenosis  - Due for cardiology follow-up 4/2024    Dyslipidemia  - Lipid screen at upcoming physical  - Atorvastatin 20 mg daily    Chronic obstructive pulmonary disease, unspecified COPD type (H)  Refilled.  - ipratropium - albuterol 0.5 mg/2.5 mg/3 mL (DUONEB) 0.5-2.5 (3) MG/3ML neb solution; Take 1 vial (3 mLs) by nebulization every 6 hours    I spent a total of 34 minutes on the day of the visit.   Time spent by me doing chart review, history and exam, documentation and further activities per the note       Follow-up for annual physical in about 3 to 4 months.    Jaime Hebert MD  [facility]   Jamie is a 73 year old, presenting for the following health issues:  Diabetes      HPI     Diabetes Follow-up  How often are you checking your blood sugar? A few times a month  What time of day are you checking your blood sugars (select all that apply)?  Before and after meals  Have you had any blood sugars above 200?  No  Have you had  "any blood sugars below 70?  No  What symptoms do you notice when your blood sugar is low?  None  What concerns do you have today about your diabetes? None   Do you have any of these symptoms? (Select all that apply)  No numbness or tingling in feet.  No redness, sores or blisters on feet.  No complaints of excessive thirst.  No reports of blurry vision.  No significant changes to weight.  Have you had a diabetic eye exam in the last 12 months? No  -On metformin monotherapy  -Denies GI effects  -Not checking blood sugars    BP Readings from Last 2 Encounters:   01/12/24 132/70   11/17/23 122/66     No results found for: \"A1C\", \"LDL\"         Hypertension Follow-up  Do you check your blood pressure regularly outside of the clinic? Yes   Are you following a low salt diet? Yes  Are your blood pressures ever more than 140 on the top number (systolic) OR more   than 90 on the bottom number (diastolic), for example 140/90? No  -Consistently in the 130s at home  -No headache, dizziness, lightheadedness, chest pain, palpitations, shortness of breath, peripheral edema    Review of Systems   Constitutional, HEENT, cardiovascular, pulmonary, gi and gu systems are negative, except as otherwise noted.      Objective    /70   Pulse 77   Temp 97.1  F (36.2  C) (Tympanic)   Wt 78.9 kg (174 lb)   SpO2 98%   BMI 23.60 kg/m    Body mass index is 23.6 kg/m .  Physical Exam  Vitals reviewed.   Constitutional:       Appearance: Normal appearance.   HENT:      Head: Normocephalic and atraumatic.   Cardiovascular:      Rate and Rhythm: Normal rate and regular rhythm.      Heart sounds: No murmur heard.  Pulmonary:      Effort: Pulmonary effort is normal.      Breath sounds: Normal breath sounds. No stridor. No wheezing, rhonchi or rales.   Musculoskeletal:         General: Normal range of motion.   Skin:     General: Skin is warm and dry.   Neurological:      General: No focal deficit present.      Mental Status: He is alert and " oriented to person, place, and time.   Psychiatric:         Mood and Affect: Mood normal.         Behavior: Behavior normal.      Diabetic Foot Screen:  Any complaints of increased pain or numbness ? No  Is there a foot ulcer now or a history of foot ulcer? No  Does the foot have an abnormal shape? No  Are the nails thick, too long or ingrown? No  Are there any redness or open areas? No         Sensation Testing done at all points on the diagram with monofilament     Right Foot: Sensation Normal at all points  Left Foot: Sensation Normal at all points     Risk Category: 0- No loss of protective sensation  Performed by Jaime Hebert MD

## 2024-01-12 ENCOUNTER — OFFICE VISIT (OUTPATIENT)
Dept: FAMILY MEDICINE | Facility: OTHER | Age: 74
End: 2024-01-12
Attending: STUDENT IN AN ORGANIZED HEALTH CARE EDUCATION/TRAINING PROGRAM
Payer: COMMERCIAL

## 2024-01-12 VITALS
SYSTOLIC BLOOD PRESSURE: 132 MMHG | TEMPERATURE: 97.1 F | OXYGEN SATURATION: 98 % | HEART RATE: 77 BPM | DIASTOLIC BLOOD PRESSURE: 70 MMHG | BODY MASS INDEX: 23.6 KG/M2 | WEIGHT: 174 LBS

## 2024-01-12 DIAGNOSIS — E11.9 TYPE 2 DIABETES MELLITUS WITHOUT COMPLICATION, WITHOUT LONG-TERM CURRENT USE OF INSULIN (H): Primary | ICD-10-CM

## 2024-01-12 DIAGNOSIS — J44.9 CHRONIC OBSTRUCTIVE PULMONARY DISEASE, UNSPECIFIED COPD TYPE (H): ICD-10-CM

## 2024-01-12 DIAGNOSIS — I10 ESSENTIAL (PRIMARY) HYPERTENSION: ICD-10-CM

## 2024-01-12 DIAGNOSIS — E78.5 DYSLIPIDEMIA: ICD-10-CM

## 2024-01-12 PROCEDURE — G0463 HOSPITAL OUTPT CLINIC VISIT: HCPCS

## 2024-01-12 PROCEDURE — 99214 OFFICE O/P EST MOD 30 MIN: CPT | Performed by: STUDENT IN AN ORGANIZED HEALTH CARE EDUCATION/TRAINING PROGRAM

## 2024-01-12 RX ORDER — IPRATROPIUM BROMIDE AND ALBUTEROL SULFATE 2.5; .5 MG/3ML; MG/3ML
3 SOLUTION RESPIRATORY (INHALATION) EVERY 6 HOURS
Qty: 90 ML | Refills: 1 | Status: SHIPPED | OUTPATIENT
Start: 2024-01-12

## 2024-01-12 RX ORDER — AMLODIPINE BESYLATE 10 MG/1
10 TABLET ORAL DAILY
COMMUNITY
Start: 2023-11-27

## 2024-01-12 RX ORDER — METOPROLOL SUCCINATE 50 MG/1
50 TABLET, EXTENDED RELEASE ORAL DAILY
COMMUNITY

## 2024-01-12 ASSESSMENT — PAIN SCALES - GENERAL: PAINLEVEL: NO PAIN (0)

## 2024-01-31 NOTE — PROGRESS NOTES
Name: Jamie Chu    MRN#: [id number]    Reason for Hospitalization: Acute respiratory failure with hypoxia (H) [J96.01]  Pneumonia of left lower lobe due to infectious organism [J18.9]    Discharge Date: 1/20/2023    Patient / Family response to discharge plan: in agreement    Follow-Up Appt:   Future Appointments   Date Time Provider Department Center   1/25/2023  8:20 AM Jaime Hebert MD [facility]   2/13/2023  3:20 PM Jaime Hebert MD [facility]       Other Providers (Care Coordinator, County Services, PCA services etc): Yes: [facility], set up establish care appt.    Discharge Disposition: home with Nephew    Sammie Ferrara CM       show

## 2024-03-11 NOTE — PROCEDURE: MEDICATION COUNSELING
~2 days Finasteride Male Counseling: Finasteride Counseling:  I discussed with the patient the risks of use of finasteride including but not limited to decreased libido, decreased ejaculate volume, gynecomastia, and depression. Women should not handle medication.  All of the patient's questions and concerns were addressed. Finasteride Counseling:  I discussed with the patient the risks of use of finasteride including but not limited to decreased libido, decreased ejaculate volume, gynecomastia, and depression. Women should not handle medication.  All of the patient's questions and concerns were addressed.

## 2024-06-14 ENCOUNTER — OFFICE VISIT (OUTPATIENT)
Dept: FAMILY MEDICINE | Facility: OTHER | Age: 74
End: 2024-06-14
Attending: STUDENT IN AN ORGANIZED HEALTH CARE EDUCATION/TRAINING PROGRAM
Payer: COMMERCIAL

## 2024-06-14 VITALS
WEIGHT: 168.6 LBS | HEART RATE: 88 BPM | TEMPERATURE: 97.1 F | DIASTOLIC BLOOD PRESSURE: 68 MMHG | HEIGHT: 72 IN | BODY MASS INDEX: 22.84 KG/M2 | OXYGEN SATURATION: 97 % | RESPIRATION RATE: 18 BRPM | SYSTOLIC BLOOD PRESSURE: 126 MMHG

## 2024-06-14 DIAGNOSIS — Z12.5 SCREENING FOR MALIGNANT NEOPLASM OF PROSTATE: ICD-10-CM

## 2024-06-14 DIAGNOSIS — Z11.9 SCREENING EXAMINATION FOR INFECTIOUS DISEASE: ICD-10-CM

## 2024-06-14 DIAGNOSIS — Z00.00 ENCOUNTER FOR MEDICARE ANNUAL WELLNESS EXAM: Primary | ICD-10-CM

## 2024-06-14 DIAGNOSIS — Z87.891 PERSONAL HISTORY OF NICOTINE DEPENDENCE: ICD-10-CM

## 2024-06-14 DIAGNOSIS — I10 ESSENTIAL (PRIMARY) HYPERTENSION: ICD-10-CM

## 2024-06-14 DIAGNOSIS — E11.9 TYPE 2 DIABETES MELLITUS WITHOUT COMPLICATION, WITHOUT LONG-TERM CURRENT USE OF INSULIN (H): ICD-10-CM

## 2024-06-14 DIAGNOSIS — L40.9 PSORIASIS: ICD-10-CM

## 2024-06-14 DIAGNOSIS — I25.10 CORONARY ARTERY DISEASE INVOLVING NATIVE CORONARY ARTERY OF NATIVE HEART WITHOUT ANGINA PECTORIS: ICD-10-CM

## 2024-06-14 DIAGNOSIS — E78.5 DYSLIPIDEMIA: ICD-10-CM

## 2024-06-14 DIAGNOSIS — Z12.2 ENCOUNTER FOR SCREENING FOR MALIGNANT NEOPLASM OF LUNG: ICD-10-CM

## 2024-06-14 DIAGNOSIS — Z00.00 HEALTHCARE MAINTENANCE: ICD-10-CM

## 2024-06-14 DIAGNOSIS — Z12.11 SCREENING FOR MALIGNANT NEOPLASM OF COLON: ICD-10-CM

## 2024-06-14 DIAGNOSIS — L98.9 SKIN LESION: ICD-10-CM

## 2024-06-14 DIAGNOSIS — Z87.891 PERSONAL HISTORY OF TOBACCO USE: ICD-10-CM

## 2024-06-14 DIAGNOSIS — F17.211 NICOTINE DEPENDENCE, CIGARETTES, IN REMISSION: ICD-10-CM

## 2024-06-14 PROBLEM — I70.1 RENAL ARTERY STENOSIS (H): Status: ACTIVE | Noted: 2023-10-18

## 2024-06-14 PROBLEM — I65.21 STENOSIS OF RIGHT CAROTID ARTERY: Status: ACTIVE | Noted: 2023-07-17

## 2024-06-14 PROBLEM — I49.3 PVC'S (PREMATURE VENTRICULAR CONTRACTIONS): Status: ACTIVE | Noted: 2023-07-17

## 2024-06-14 PROBLEM — R09.89 LEFT CAROTID BRUIT: Status: ACTIVE | Noted: 2022-11-07

## 2024-06-14 LAB
ALBUMIN SERPL BCG-MCNC: 4.7 G/DL (ref 3.5–5.2)
ALP SERPL-CCNC: 104 U/L (ref 40–150)
ALT SERPL W P-5'-P-CCNC: 13 U/L (ref 0–70)
ANION GAP SERPL CALCULATED.3IONS-SCNC: 12 MMOL/L (ref 7–15)
AST SERPL W P-5'-P-CCNC: 15 U/L (ref 0–45)
BASOPHILS # BLD AUTO: 0 10E3/UL (ref 0–0.2)
BASOPHILS NFR BLD AUTO: 0 %
BILIRUB SERPL-MCNC: 1.1 MG/DL
BUN SERPL-MCNC: 22.8 MG/DL (ref 8–23)
CALCIUM SERPL-MCNC: 9.8 MG/DL (ref 8.8–10.2)
CHLORIDE SERPL-SCNC: 99 MMOL/L (ref 98–107)
CHOLEST SERPL-MCNC: 113 MG/DL
CREAT SERPL-MCNC: 1.32 MG/DL (ref 0.67–1.17)
DEPRECATED HCO3 PLAS-SCNC: 25 MMOL/L (ref 22–29)
EGFRCR SERPLBLD CKD-EPI 2021: 57 ML/MIN/1.73M2
EOSINOPHIL # BLD AUTO: 0.3 10E3/UL (ref 0–0.7)
EOSINOPHIL NFR BLD AUTO: 4 %
ERYTHROCYTE [DISTWIDTH] IN BLOOD BY AUTOMATED COUNT: 13.6 % (ref 10–15)
EST. AVERAGE GLUCOSE BLD GHB EST-MCNC: 146 MG/DL
FASTING STATUS PATIENT QL REPORTED: YES
FASTING STATUS PATIENT QL REPORTED: YES
GLUCOSE SERPL-MCNC: 106 MG/DL (ref 70–99)
HBA1C MFR BLD: 6.7 %
HCT VFR BLD AUTO: 46.1 % (ref 40–53)
HDLC SERPL-MCNC: 47 MG/DL
HGB BLD-MCNC: 16 G/DL (ref 13.3–17.7)
IMM GRANULOCYTES # BLD: 0 10E3/UL
IMM GRANULOCYTES NFR BLD: 0 %
LDLC SERPL CALC-MCNC: 46 MG/DL
LYMPHOCYTES # BLD AUTO: 1.1 10E3/UL (ref 0.8–5.3)
LYMPHOCYTES NFR BLD AUTO: 13 %
MCH RBC QN AUTO: 30.8 PG (ref 26.5–33)
MCHC RBC AUTO-ENTMCNC: 34.7 G/DL (ref 31.5–36.5)
MCV RBC AUTO: 89 FL (ref 78–100)
MONOCYTES # BLD AUTO: 0.7 10E3/UL (ref 0–1.3)
MONOCYTES NFR BLD AUTO: 8 %
NEUTROPHILS # BLD AUTO: 6.5 10E3/UL (ref 1.6–8.3)
NEUTROPHILS NFR BLD AUTO: 75 %
NONHDLC SERPL-MCNC: 66 MG/DL
PLATELET # BLD AUTO: 223 10E3/UL (ref 150–450)
POTASSIUM SERPL-SCNC: 4.7 MMOL/L (ref 3.4–5.3)
PROT SERPL-MCNC: 7 G/DL (ref 6.4–8.3)
PSA SERPL DL<=0.01 NG/ML-MCNC: 0.9 NG/ML (ref 0–6.5)
RBC # BLD AUTO: 5.19 10E6/UL (ref 4.4–5.9)
SODIUM SERPL-SCNC: 136 MMOL/L (ref 135–145)
TRIGL SERPL-MCNC: 98 MG/DL
TSH SERPL DL<=0.005 MIU/L-ACNC: 1.22 UIU/ML (ref 0.3–4.2)
WBC # BLD AUTO: 8.6 10E3/UL (ref 4–11)

## 2024-06-14 PROCEDURE — 86803 HEPATITIS C AB TEST: CPT | Mod: ZL | Performed by: STUDENT IN AN ORGANIZED HEALTH CARE EDUCATION/TRAINING PROGRAM

## 2024-06-14 PROCEDURE — 83036 HEMOGLOBIN GLYCOSYLATED A1C: CPT | Mod: ZL | Performed by: STUDENT IN AN ORGANIZED HEALTH CARE EDUCATION/TRAINING PROGRAM

## 2024-06-14 PROCEDURE — G0296 VISIT TO DETERM LDCT ELIG: HCPCS | Performed by: STUDENT IN AN ORGANIZED HEALTH CARE EDUCATION/TRAINING PROGRAM

## 2024-06-14 PROCEDURE — 36415 COLL VENOUS BLD VENIPUNCTURE: CPT | Mod: ZL | Performed by: STUDENT IN AN ORGANIZED HEALTH CARE EDUCATION/TRAINING PROGRAM

## 2024-06-14 PROCEDURE — 84155 ASSAY OF PROTEIN SERUM: CPT | Mod: ZL | Performed by: STUDENT IN AN ORGANIZED HEALTH CARE EDUCATION/TRAINING PROGRAM

## 2024-06-14 PROCEDURE — 82465 ASSAY BLD/SERUM CHOLESTEROL: CPT | Mod: ZL | Performed by: STUDENT IN AN ORGANIZED HEALTH CARE EDUCATION/TRAINING PROGRAM

## 2024-06-14 PROCEDURE — 83718 ASSAY OF LIPOPROTEIN: CPT | Mod: ZL | Performed by: STUDENT IN AN ORGANIZED HEALTH CARE EDUCATION/TRAINING PROGRAM

## 2024-06-14 PROCEDURE — G0463 HOSPITAL OUTPT CLINIC VISIT: HCPCS | Mod: 25

## 2024-06-14 PROCEDURE — G0103 PSA SCREENING: HCPCS | Mod: ZL | Performed by: STUDENT IN AN ORGANIZED HEALTH CARE EDUCATION/TRAINING PROGRAM

## 2024-06-14 PROCEDURE — 84443 ASSAY THYROID STIM HORMONE: CPT | Mod: ZL | Performed by: STUDENT IN AN ORGANIZED HEALTH CARE EDUCATION/TRAINING PROGRAM

## 2024-06-14 PROCEDURE — G0439 PPPS, SUBSEQ VISIT: HCPCS | Performed by: STUDENT IN AN ORGANIZED HEALTH CARE EDUCATION/TRAINING PROGRAM

## 2024-06-14 PROCEDURE — 99213 OFFICE O/P EST LOW 20 MIN: CPT | Mod: 25 | Performed by: STUDENT IN AN ORGANIZED HEALTH CARE EDUCATION/TRAINING PROGRAM

## 2024-06-14 PROCEDURE — 85025 COMPLETE CBC W/AUTO DIFF WBC: CPT | Mod: ZL | Performed by: STUDENT IN AN ORGANIZED HEALTH CARE EDUCATION/TRAINING PROGRAM

## 2024-06-14 RX ORDER — BETAMETHASONE DIPROPIONATE 0.05 %
OINTMENT (GRAM) TOPICAL 2 TIMES DAILY
Qty: 50 G | Refills: 0 | Status: SHIPPED | OUTPATIENT
Start: 2024-06-14

## 2024-06-14 SDOH — HEALTH STABILITY: PHYSICAL HEALTH: ON AVERAGE, HOW MANY DAYS PER WEEK DO YOU ENGAGE IN MODERATE TO STRENUOUS EXERCISE (LIKE A BRISK WALK)?: 2 DAYS

## 2024-06-14 ASSESSMENT — PAIN SCALES - GENERAL: PAINLEVEL: NO PAIN (0)

## 2024-06-14 ASSESSMENT — SOCIAL DETERMINANTS OF HEALTH (SDOH): HOW OFTEN DO YOU GET TOGETHER WITH FRIENDS OR RELATIVES?: ONCE A WEEK

## 2024-06-14 NOTE — PATIENT INSTRUCTIONS
"Patient Education   Preventive Care Advice   This is general advice we often give to help people stay healthy. Your care team may have specific advice just for you. Please talk to your care team about your own preventive care needs.  Lifestyle  Exercise at least 150 minutes each week (30 minutes a day, 5 days a week).  Do muscle strengthening activities 2 days a week. These help control your weight and prevent disease.  No smoking.  Wear sunscreen to prevent skin cancer.  Have your home tested for radon every 2 to 5 years. Radon is a colorless, odorless gas that can harm your lungs. To learn more, go to www.health.[facility].[place].us and search for \"Radon in Homes.\"  Keep guns unloaded and locked up in a safe place like a safe or gun vault, or, use a gun lock and hide the keys. Always lock away bullets separately. To learn more, visit [employer].gov and search for \"safe gun storage.\"  Nutrition  Eat 5 or more servings of fruits and vegetables each day.  Try wheat bread, brown rice and whole grain pasta (instead of white bread, rice, and pasta).  Get enough calcium and vitamin D. Check the label on foods and aim for 100% of the RDA (recommended daily allowance).  Regular exams  Have a dental exam and cleaning every 6 months.  See your health care team every year to talk about:  Any changes in your health.  Any medicines your care team has prescribed.  Preventive care, family planning, and ways to prevent chronic diseases.  Shots (vaccines)   HPV shots (up to age 26), if you've never had them before.  Hepatitis B shots (up to age 59), if you've never had them before.  COVID-19 shot: Get this shot when it's due.  Flu shot: Get a flu shot every year.  Tetanus shot: Get a tetanus shot every 10 years.  Pneumococcal, hepatitis A, and RSV shots: Ask your care team if you need these based on your risk.  Shingles shot (for age 50 and up).  General health tests  Diabetes screening:  Starting at age 35, Get screened for diabetes at least " every 3 years.  If you are younger than age 35, ask your care team if you should be screened for diabetes.  Cholesterol test: At age 39, start having a cholesterol test every 5 years, or more often if advised.  Bone density scan (DEXA): At age 50, ask your care team if you should have this scan for osteoporosis (brittle bones).  Hepatitis C: Get tested at least once in your life.  Abdominal aortic aneurysm screening: Talk to your doctor about having this screening if you:  Have ever smoked; and  Are biologically male; and  Are between the ages of 65 and 75.  STIs (sexually transmitted infections)  Before age 24: Ask your care team if you should be screened for STIs.  After age 24: Get screened for STIs if you're at risk. You are at risk for STIs (including HIV) if:  You are sexually active with more than one person.  You don't use condoms every time.  You or a partner was diagnosed with a sexually transmitted infection.  If you are at risk for HIV, ask about PrEP medicine to prevent HIV.  Get tested for HIV at least once in your life, whether you are at risk for HIV or not.  Cancer screening tests  Cervical cancer screening: If you have a cervix, begin getting regular cervical cancer screening tests at age 21. Most people who have regular screenings with normal results can stop after age 65. Talk about this with your provider.  Breast cancer scan (mammogram): If you've ever had breasts, begin having regular mammograms starting at age 40. This is a scan to check for breast cancer.  Colon cancer screening: It is important to start screening for colon cancer at age 45.  Have a colonoscopy test every 10 years (or more often if you're at risk) Or, ask your provider about stool tests like a FIT test every year or Cologuard test every 3 years.  To learn more about your testing options, visit: www.Joyride/730491.pdf.  For help making a decision, visit: [email or web address]/[email or web address].  Prostate cancer screening test: If you have a  prostate and are age 55 to 69, ask your provider if you would benefit from a yearly prostate cancer screening test.  Lung cancer screening: If you are a current or former smoker age 50 to 80, ask your care team if ongoing lung cancer screenings are right for you.  For informational purposes only. Not to replace the advice of your health care provider. Copyright   2023 [facility]. All rights reserved. Clinically reviewed by the [facility] Transitions Program. [employer] 285428 - REV 04/24.     Lung Cancer Screening   Frequently Asked Questions  If you are at high-risk for lung cancer, getting screened with low-dose computed tomography (LDCT) every year can help save your life. This handout offers answers to some of the most common questions about lung cancer screening. If you have other questions, please call [phone] ([phone]).     What is it?  Lung cancer screening uses special X-ray technology to create an image of your lung tissue. The exam is quick and easy and takes less than 10 seconds. We don t give you any medicine or use any needles. You can eat before and after the exam. You don t need to change your clothes as long as the clothing on your chest doesn t contain metal. But, you do need to be able to hold your breath for at least 6 seconds during the exam.    What is the goal of lung cancer screening?  The goal of lung cancer screening is to save lives. Many times, lung cancer is not found until a person starts having physical symptoms. Lung cancer screening can help detect lung cancer in the earliest stages when it may be easier to treat.    Who should be screened for lung cancer?  We suggest lung cancer screening for anyone who is at high-risk for lung cancer. You are in the high-risk group if you:      are between the ages of 55 and 79, and    have smoked at least 1 pack of cigarettes a day for 20 or more years, and    still smoke or have quit within the past 15  years.    However, if you have a new cough or shortness of breath, you should talk to your doctor before being screened.    Why does it matter if I have symptoms?  Certain symptoms can be a sign that you have a condition in your lungs that should be checked and treated by your doctor. These symptoms include fever, chest pain, a new or changing cough, shortness of breath that you have never felt before, coughing up blood or unexplained weight loss. Having any of these symptoms can greatly affect the results of lung cancer screening.       Should all smokers get an LDCT lung cancer screening exam?  It depends. Lung cancer screening is for a very specific group of men and women who have a history of heavy smoking over a long period of time (see  Who should be screened for lung cancer  above).  I am in the high-risk group, but have been diagnosed with cancer in the past. Is LDCT lung cancer screening right for me?  In some cases, you should not have LDCT lung screening, such as when your doctor is already following your cancer with CT scan studies. Your doctor will help you decide if LDCT lung screening is right for you.  Do I need to have a screening exam every year?  Yes. If you are in the high-risk group described earlier, you should get an LDCT lung cancer screening exam every year until you are 79, or are no longer willing or able to undergo screening and possible procedures to diagnose and treat lung cancer.  How effective is LDCT at preventing death from lung cancer?  Studies have shown that LDCT lung cancer screening can lower the risk of death from lung cancer by 20 percent in people who are at high-risk.  What are the risks?  There are some risks and limitations of LDCT lung cancer screening. We want to make sure you understand the risks and benefits, so please let us know if you have any questions. Your doctor may want to talk with you more about these risks.    Radiation exposure: As with any exam that uses  radiation, there is a very small increased risk of cancer. The amount of radiation in LDCT is small--about the same amount a person would get from a mammogram. Your doctor orders the exam when he or she feels the potential benefits outweigh the risks.    False negatives: No test is perfect, including LDCT. It is possible that you may have a medical condition, including lung cancer, that is not found during your exam. This is called a false negative result.    False positives and more testing: LDCT very often finds something in the lung that could be cancer, but in fact is not. This is called a false positive result. False positive tests often cause anxiety. To make sure these findings are not cancer, you may need to have more tests. These tests will be done only if you give us permission. Sometimes patients need a treatment that can have side effects, such as a biopsy. For more information on false positives, see  What can I expect from the results?     Findings not related to lung cancer: Your LDCT exam also takes pictures of areas of your body next to your lungs. In a very small number of cases, the CT scan will show an abnormal finding in one of these areas, such as your kidneys, adrenal glands, liver or thyroid. This finding may not be serious, but you may need more tests. Your doctor can help you decide what other tests you may need, if any.  What can I expect from the results?  About 1 out of 4 LDCT exams will find something that may need more tests. Most of the time, these findings are lung nodules. Lung nodules are very small collections of tissue in the lung. These nodules are very common, and the vast majority--more than 97 percent--are not cancer (benign). Most are normal lymph nodes or small areas of scarring from past infections.  But, if a small lung nodule is found to be cancer, the cancer can be cured more than 90 percent of the time. To know if the nodule is cancer, we may need to get more images  before your next yearly screening exam. If the nodule has suspicious features (for example, it is large, has an odd shape or grows over time), we will refer you to a specialist for further testing.  Will my doctor also get the results?  Yes. Your doctor will get a copy of your results.  Is it okay to keep smoking now that there s a cancer screening exam?  No. Tobacco is one of the strongest cancer-causing agents. It causes not only lung cancer, but other cancers and cardiovascular (heart) diseases as well. The damage caused by smoking builds over time. This means that the longer you smoke, the higher your risk of disease. While it is never too late to quit, the sooner you quit, the better.  Where can I find help to quit smoking?  The best way to prevent lung cancer is to stop smoking. If you have already quit smoking, congratulations and keep it up! For help on quitting smoking, please call [facility] at [phone]-QUITNOW ([phone]) or the American Cancer Society at 1-970.251.2361 to find local resources near you.  One-on-one health coaching:  If you d prefer to work individually with a health care provider on tobacco cessation, we offer:      Medication Therapy Management:  Our specially trained pharmacists work closely with you and your doctor to help you quit smoking.  Call [phone] or [phone] (toll free).

## 2024-06-14 NOTE — LETTER
My COPD Action Plan     Name: Jamie Chu    YOB: 1950   Date: 6/14/2024    My doctor: Jaime Hebert MD   My clinic: [address] [address]  [phone]  My Controller Medicine:  none   Dose: n/a     My Rescue Medicine: Albuterol nebulizer solution    Dose: duo neb 0.5-2.5     My Flare Up Medicine:  n/a   Dose: n/a     My COPD Severity:       Use of Oxygen: Oxygen Not Prescribed      Make sure you've had your pneumonia   vaccines.          GREEN ZONE       Doing well today    Usual level of activity and exercise  Usual amount of cough and mucus  No shortness of breath  Usual level of health (thinking clearly, sleeping well, feel like eating) Actions:    Take daily medicines  Use oxygen as prescribed  Follow regular exercise and diet plan  Avoid cigarette smoke and other irritants that harm the lungs           YELLOW ZONE          Having a bad day or flare up    Short of breath more than usual  A lot more sputum (mucus) than usual  Sputum looks yellow, green, tan, brown or bloody  More coughing or wheezing  Fever or chills  Less energy; trouble completing activities  Trouble thinking or focusing  Using quick relief inhaler or nebulizer more often  Poor sleep; symptoms wake me up  Do not feel like eating Actions:    Get plenty of rest  Take daily medicines  Use quick relief inhaler every 6 hours  If you use oxygen, call you doctor to see if you should adjust your oxygen  Do breathing exercises or other things to help you relax  Let a loved one, friend or neighbor know you are feeling worse  Call your care team if you have 2 or more symptoms.  Start taking steroids or antibiotics if directed by your care team           RED ZONE       Need medical care now    Severe shortness of breath (feel you can't breathe)  Fever, chills  Not enough breath to do any activity  Trouble coughing up mucus, walking or talking  Blood in mucus  Frequent coughing Rescue  medicines are not working  Not able to sleep because of breathing  Feel confused or drowsy  Chest pain    Actions:    Call your health care team.  If you cannot reach your care team, call 911 or go to the emergency room.        Annual Reminders:  Meet with Care Team, Flu Shot every Fall  Pharmacy:    [id number] - [place], [place] - [address]   [id number] - [place], [place] - [address]

## 2024-06-14 NOTE — LETTER
June 19, 2024        Jamie Chu  [address] [facility] [address]        Dear ,    We are writing to inform you of your test results.    Blood counts are normal, cholesterol panel at goal, PSA and thyroid are normal as well.  A1c is 6.7% and at goal.  Hep C screening negative as expected.  Creatinine slightly elevated at 1.32, electrolytes and liver function are normal.  Follow-up as already scheduled.     Resulted Orders   Hepatitis C Screen Reflex to HCV RNA Quant and Genotype   Result Value Ref Range    Hepatitis C Antibody Nonreactive Nonreactive      Comment:      A nonreactive screening test result does not exclude the possibility of exposure to or infection with HCV. Nonreactive screening test results in individuals with prior exposure to HCV may be due to antibody levels below the limit of detection of this assay or lack of reactivity to the HCV antigens used in this assay. Patients with recent HCV infections (<3 months from time of exposure) may have false-negative HCV antibody results due to the time needed for seroconversion (average of 8 to 9 weeks).   PSA, screen   Result Value Ref Range    Prostate Specific Antigen Screen 0.90 0.00 - 6.50 ng/mL    Narrative    This result is obtained using the Roche Elecsys total PSA method on the burak e601 immunoassay analyzer. Results obtained with different assay methods or kits cannot be used interchangeably.   TSH with free T4 reflex   Result Value Ref Range    TSH 1.22 0.30 - 4.20 uIU/mL   Lipid Profile (Chol, Trig, HDL, LDL calc)   Result Value Ref Range    Cholesterol 113 <200 mg/dL    Triglycerides 98 <150 mg/dL    Direct Measure HDL 47 >=40 mg/dL    LDL Cholesterol Calculated 46 <=100 mg/dL    Non HDL Cholesterol 66 <130 mg/dL    Patient Fasting > 8hrs? Yes     Narrative    Cholesterol  Desirable:  <200 mg/dL    Triglycerides  Normal:  Less than 150 mg/dL  Borderline High:  150-199 mg/dL  High:  200-499 mg/dL  Very High:  Greater than or equal to  500 mg/dL    Direct Measure HDL  Female:  Greater than or equal to 50 mg/dL   Male:  Greater than or equal to 40 mg/dL    LDL Cholesterol  Desirable:  <100mg/dL  Above Desirable:  100-129 mg/dL   Borderline High:  130-159 mg/dL   High:  160-189 mg/dL   Very High:  >= 190 mg/dL    Non HDL Cholesterol  Desirable:  130 mg/dL  Above Desirable:  130-159 mg/dL  Borderline High:  160-189 mg/dL  High:  190-219 mg/dL  Very High:  Greater than or equal to 220 mg/dL   Hemoglobin A1c   Result Value Ref Range    Estimated Average Glucose 146 mg/dL    Hemoglobin A1C 6.7 (H) <5.7 %      Comment:      Normal <5.7%   Prediabetes 5.7-6.4%    Diabetes 6.5% or higher     Note: Adopted from ADA consensus guidelines.   Comprehensive metabolic panel (BMP + Alb, Alk Phos, ALT, AST, Total. Bili, TP)   Result Value Ref Range    Sodium 136 135 - 145 mmol/L      Comment:      Reference intervals for this test were updated on 09/26/2023 to more accurately reflect our healthy population. There may be differences in the flagging of prior results with similar values performed with this method. Interpretation of those prior results can be made in the context of the updated reference intervals.     Potassium 4.7 3.4 - 5.3 mmol/L    Carbon Dioxide (CO2) 25 22 - 29 mmol/L    Anion Gap 12 7 - 15 mmol/L    Urea Nitrogen 22.8 8.0 - 23.0 mg/dL    Creatinine 1.32 (H) 0.67 - 1.17 mg/dL    GFR Estimate 57 (L) >60 mL/min/1.73m2      Comment:      eGFR calculated using 2021 CKD-EPI equation.    Calcium 9.8 8.8 - 10.2 mg/dL    Chloride 99 98 - 107 mmol/L    Glucose 106 (H) 70 - 99 mg/dL    Alkaline Phosphatase 104 40 - 150 U/L    AST 15 0 - 45 U/L      Comment:      Reference intervals for this test were updated on 6/12/2023 to more accurately reflect our healthy population. There may be differences in the flagging of prior results with similar values performed with this method. Interpretation of those prior results can be made in the context of the updated  reference intervals.    ALT 13 0 - 70 U/L      Comment:      Reference intervals for this test were updated on 6/12/2023 to more accurately reflect our healthy population. There may be differences in the flagging of prior results with similar values performed with this method. Interpretation of those prior results can be made in the context of the updated reference intervals.      Protein Total 7.0 6.4 - 8.3 g/dL    Albumin 4.7 3.5 - 5.2 g/dL    Bilirubin Total 1.1 <=1.2 mg/dL    Patient Fasting > 8hrs? Yes    CBC with platelets and differential   Result Value Ref Range    WBC Count 8.6 4.0 - 11.0 10e3/uL    RBC Count 5.19 4.40 - 5.90 10e6/uL    Hemoglobin 16.0 13.3 - 17.7 g/dL    Hematocrit 46.1 40.0 - 53.0 %    MCV 89 78 - 100 fL    MCH 30.8 26.5 - 33.0 pg    MCHC 34.7 31.5 - 36.5 g/dL    RDW 13.6 10.0 - 15.0 %    Platelet Count 223 150 - 450 10e3/uL    % Neutrophils 75 %    % Lymphocytes 13 %    % Monocytes 8 %    % Eosinophils 4 %    % Basophils 0 %    % Immature Granulocytes 0 %    Absolute Neutrophils 6.5 1.6 - 8.3 10e3/uL    Absolute Lymphocytes 1.1 0.8 - 5.3 10e3/uL    Absolute Monocytes 0.7 0.0 - 1.3 10e3/uL    Absolute Eosinophils 0.3 0.0 - 0.7 10e3/uL    Absolute Basophils 0.0 0.0 - 0.2 10e3/uL    Absolute Immature Granulocytes 0.0 <=0.4 10e3/uL       If you have any questions or concerns, please call the clinic at the number listed above.       Sincerely,      Jaime Hebert MD

## 2024-06-14 NOTE — PROGRESS NOTES
Preventive Care Visit  [facility] JOSE M  Jaime Hebert MD, Family Medicine  2024      Assessment & Plan     Encounter for Medicare annual wellness exam  - CBC with platelets and differential; Future  - Comprehensive metabolic panel (BMP + Alb, Alk Phos, ALT, AST, Total. Bili, TP); Future  - Hemoglobin A1c; Future  - Lipid Profile (Chol, Trig, HDL, LDL calc); Future  - TSH with free T4 reflex; Future  - PSA, screen; Future  - Hepatitis C Screen Reflex to HCV RNA Quant and Genotype; Future  - Preventative screening guidelines provided in AVS  - Return in 1 year for annual physical    Healthcare maintenance  - BP: Controlled.  See below.  - BMI: At goal.  Discussed healthy diet and excise recommendations.  Estimated body mass index is 22.87 kg/m  as calculated from the following:    Height as of this encounter: 1.829 m (6').    Weight as of this encounter: 76.5 kg (168 lb 9.6 oz).  - ASCVD risk screening: Update A1c/lipid screen today.   The ASCVD Risk score (Michael SANCHEZ, et al., 2019) failed to calculate for the following reasons:    The patient has a prior MI or stroke diagnosis  - Mood: No concerns.      2024     9:18 AM 2024    10:52 AM   PHQ-2 ( 1999 [employer])   Q1: Little interest or pleasure in doing things 0 0   Q2: Feeling down, depressed or hopeless 0 0   PHQ-2 Score 0 0   Q1: Little interest or pleasure in doing things  Not at all   Q2: Feeling down, depressed or hopeless  Not at all   PHQ-2 Score  0   - Tobacco/substance screening: No tobacco or illicit substance use.     Tobacco Use      Smoking status: Former        Packs/day: 0.00        Years: 0.5 packs/day for 57.2 years (28.6 ttl pk-yrs)        Types: Cigarettes        Start date:         Quit date: 2022        Years since quittin.2      Smokeless tobacco: Never  - Infectious disease screening: Low risk but will screen as below  - Cancer screening:              -Family history: n/a   -Lung: LDCT ordered today   -Colon:  Discussed screening options.  Declined.   -Prostate: PSA screen today.  - Immunizations: Due for PCV, Tdap, zoster; declined    Type 2 diabetes mellitus without complication, without long-term current use of insulin (H)  - Hemoglobin A1c  - Metformin  - ASA/statin    Dyslipidemia  Lipitor 20 mg daily  - Lipid Profile (Chol, Trig, HDL, LDL calc)    Essential (primary) hypertension  - Comprehensive metabolic panel (BMP + Alb, Alk Phos, ALT, AST, Total. Bili, TP)  - Amlodipine 10 mg daily  - Metoprolol ER 50 mg daily  - CBC with platelets and differential    Coronary artery disease involving native coronary artery of native heart without angina pectoris  Follows with [facility] cardiology.  - Comprehensive metabolic panel (BMP + Alb, Alk Phos, ALT, AST, Total. Bili, TP)  - CBC with platelets and differential  - ASA/statin  - Amlodipine  - Metoprolol    Encounter for screening for malignant neoplasm of lung  Personal history of nicotine dependence  Nicotine dependence, cigarettes, in remission  - Prof fee: Shared Decision Making for Lung Cancer Screening  - CT Chest Lung Cancer Scrn Low Dose wo; Future    Screening for malignant neoplasm of colon  Discussed options.  Declines.    Screening for malignant neoplasm of prostate  - PSA, screen    Screening examination for infectious disease  - Hepatitis C Screen Reflex to HCV RNA Quant and Genotype    Psoriasis  Fairly severe psoriasis on hands.  Try increased potency topical steroid.  May need dermatology referral if suboptimal results.  - betamethasone dipropionate (DIPROSONE) 0.05 % external ointment; Apply topically 2 times daily Use on hands. Do not use for more than 14 days in a row.  Dispense: 50 g; Refill: 0    Skin lesion  1.5 cm x 1 cm suspicious lesion on back.  Return for excisional biopsy.  - Discussed tentative plan for wide local excision    Patient has been advised of split billing requirements and indicates understanding:  "Yes      Counseling  Appropriate preventive services were discussed with this patient, including applicable screening as appropriate for fall prevention, nutrition, physical activity, Tobacco-use cessation, weight loss and cognition.  Checklist reviewing preventive services available has been given to the patient.  Reviewed patient's diet, addressing concerns and/or questions.   He is at risk for lack of exercise and has been provided with information to increase physical activity for the benefit of his well-being.   The patient was instructed to see the dentist every 6 months.   He is at risk for psychosocial distress and has been provided with information to reduce risk.   The patient was provided with written information regarding signs of hearing loss.       Follow-up at patient convenience for skin lesion removal.  Follow-up 1 year for annual physical.    Lorri Ayala is a 73 year old, presenting for the following:  Medicare Visit        6/14/2024     9:02 AM   Additional Questions   Roomed by Elsa DELACRUZ     Health Care Directive  Patient does not have a Health Care Directive or Living Will: Discussed advance care planning with patient; however, patient declined at this time.    HPI    Skin lesion 1.5 x 1 cm      Diabetes Follow-up  How often are you checking your blood sugar? Not at all  What concerns do you have today about your diabetes? None   Do you have any of these symptoms? (Select all that apply)  Redness, sores, or blisters on feet- eczema on feet  Have you had a diabetic eye exam in the last 12 months? No    BP Readings from Last 2 Encounters:   06/14/24 126/68   01/12/24 132/70     No results found for: \"A1C\", \"LDL\"         Hyperlipidemia Follow-Up  Are you regularly taking any medication or supplement to lower your cholesterol?   No  Are you having muscle aches or other side effects that you think could be caused by your cholesterol lowering medication?  No- but has a sore neck every now and " "then- not sure that it is related or not    Hypertension Follow-up  Do you check your blood pressure regularly outside of the clinic? Yes   Are you following a low salt diet? Yes  Are your blood pressures ever more than 140 on the top number (systolic) OR more   than 90 on the bottom number (diastolic), for example 140/90? Yes- 140's sometimes but usually in 130's    COPD Follow-Up  Overall, how are your COPD symptoms since your last clinic visit?  Slightly worse  How much fatigue or shortness of breath do you have when you are walking?  More than usual  How much shortness of breath do you have when you are resting?  None  How often do you cough? Rarely  Have you noticed any change in your sputum/phlegm?  No  Have you experienced a recent fever? No  Please describe how far you can walk without stopping to rest:  Less than a mile  How many flights of stairs are you able to walk up without stopping?  1 but only has 1 flight of stairs at home  Have you had any Emergency Room Visits, Urgent Care Visits, or Hospital Admissions because of your COPD since your last office visit?  No    History   Smoking Status    Former    Packs/day: 0.50    Years: 57.00    Types: Cigarettes    Start date: 1965    Quit date: 4/1/2022   Smokeless Tobacco    Never   No results found for: \"FEV1\", \"TBP0UST\"        6/14/2024   General Health   How would you rate your overall physical health? Good   Feel stress (tense, anxious, or unable to sleep) Only a little   (!) STRESS CONCERN      6/14/2024   Nutrition   Diet: Regular (no restrictions)         6/14/2024   Exercise   Days per week of moderate/strenous exercise 2 days   (!) EXERCISE CONCERN      6/14/2024   Social Factors   Frequency of gathering with friends or relatives Once a week   Worry food won't last until get money to buy more No   Food not last or not have enough money for food? No   Do you have housing?  Yes   Are you worried about losing your housing? No   Lack of transportation? " No   Unable to get utilities (heat,electricity)? No         2024   Fall Risk   Fallen 2 or more times in the past year? No   Trouble with walking or balance? No          2024   Activities of Daily Living- Home Safety   Needs help with the following daily activites None of the above   Safety concerns in the home None of the above         2024   Dental   Dentist two times every year? (!) NO         2024   Hearing Screening   Hearing concerns? (!) IT'S HARD TO FOLLOW A CONVERSATION IN A NOISY RESTAURANT OR CROWDED ROOM.    (!) TROUBLE UNDERSTANDING SOFT OR WHISPERED SPEECH.         2024   Driving Risk Screening   Patient/family members have concerns about driving No         2024   General Alertness/Fatigue Screening   Have you been more tired than usual lately? No         2024   Urinary Incontinence Screening   Bothered by leaking urine in past 6 months No         2024   TB Screening   Were you born outside of the [place]? No           Today's PHQ-2 Score:       2024     9:18 AM   PHQ-2 (  [employer])   Q1: Little interest or pleasure in doing things 0   Q2: Feeling down, depressed or hopeless 0   PHQ-2 Score 0         2024   Substance Use   Alcohol more than 3/day or more than 7/wk No   Do you have a current opioid prescription? No   How severe/bad is pain from 1 to 10? 0/10 (No Pain)   Do you use any other substances recreationally? No     Social History     Tobacco Use    Smoking status: Former     Current packs/day: 0.00     Average packs/day: 0.5 packs/day for 57.2 years (28.6 ttl pk-yrs)     Types: Cigarettes     Start date:      Quit date: 2022     Years since quittin.2    Smokeless tobacco: Never   Vaping Use    Vaping status: Never Used       ASCVD Risk   The ASCVD Risk score (Michael SANCHEZ, et al., 2019) failed to calculate for the following reasons:    The patient has a prior MI or stroke diagnosis    Fracture Risk Assessment Tool  Link to [employer]  Calculator  Use the information below to complete the [employer] calculator  : 1950  Sex: male  Weight (kg): 76.5 kg (actual weight)  Height (cm): 182.9 cm  Previous Fragility Fracture:  No  History of parent with fractured hip:  No  Current Smoking:  No  Patient has been on glucocorticoids for more than 3 months (5mg/day or more): No  Rheumatoid Arthritis on Problem List:  No  Secondary Osteoporosis on Problem List:  No  Consumes 3 or more units of alcohol per day: No  Femoral Neck BMD (g/cm2)            Reviewed and updated as needed this visit by Provider                    Lab work is in process  Current providers sharing in care for this patient include:  Patient Care Team:  Jaime Hebert MD as PCP - General (Family Medicine)  Jaime Hebert MD as Assigned PCP  Lanette Barahona PA-C as Assigned Surgical Provider    The following health maintenance items are reviewed in Epic and correct as of today:  Health Maintenance   Topic Date Due    HF ACTION PLAN  Never done    MICROALBUMIN  Never done    SPIROMETRY  Never done    ADVANCE CARE PLANNING  Never done    COPD ACTION PLAN  Never done    EYE EXAM  Never done    Pneumococcal Vaccine: 65+ Years (1 of 2 - PCV) Never done    COLORECTAL CANCER SCREENING  Never done    DTAP/TDAP/TD IMMUNIZATION (1 - Tdap) Never done    ZOSTER IMMUNIZATION (1 of 2) Never done    RSV VACCINE (Pregnancy & 60+) (1 - 1-dose 60+ series) Never done    A1C  2023    LUNG CANCER SCREENING  2024    BMP  2024    CBC  2024    COVID-19 Vaccine ( season) 2024    ALT  2024    LIPID  2024    INFLUENZA VACCINE (Season Ended) 2024    DIABETIC FOOT EXAM  2025    MEDICARE ANNUAL WELLNESS VISIT  2025    FALL RISK ASSESSMENT  2025    TSH W/FREE T4 REFLEX  Completed    HEPATITIS C SCREENING  Completed    PHQ-2 (once per calendar year)  Completed    AORTIC ANEURYSM SCREENING (SYSTEM ASSIGNED)  Completed    IPV IMMUNIZATION  Aged Out     HPV IMMUNIZATION  Aged Out    MENINGITIS IMMUNIZATION  Aged Out    RSV MONOCLONAL ANTIBODY  Aged Out         Review of Systems  Constitutional, HEENT, cardiovascular, pulmonary, gi and gu systems are negative, except as otherwise noted.     Objective    Exam  /68 (BP Location: Right arm, Patient Position: Sitting, Cuff Size: Adult Regular)   Pulse 88   Temp 97.1  F (36.2  C) (Tympanic)   Resp 18   Ht 1.829 m (6')   Wt 76.5 kg (168 lb 9.6 oz)   SpO2 97%   BMI 22.87 kg/m     Estimated body mass index is 22.87 kg/m  as calculated from the following:    Height as of this encounter: 1.829 m (6').    Weight as of this encounter: 76.5 kg (168 lb 9.6 oz).    Physical Exam  GENERAL: alert and no distress  EYES: Eyes grossly normal to inspection, PERRL and conjunctivae and sclerae normal  HENT: ear canals and TM's normal, nose and mouth without ulcers or lesions  NECK: no adenopathy, no asymmetry, masses, or scars  RESP: lungs clear to auscultation - no rales, rhonchi or wheezes  CV: regular rate and rhythm, normal S1 S2, no S3 or S4, no murmur, click or rub, no peripheral edema  ABDOMEN: soft, nontender, no hepatosplenomegaly, no masses and bowel sounds normal  MS: no gross musculoskeletal defects noted, no edema  SKIN: 1.5 cm x 1 cm variably pigmented macule, oval-shaped with slightly irregular border on back.  NEURO: Normal strength and tone, mentation intact and speech normal  PSYCH: mentation appears normal, affect normal/bright        6/14/2024   Mini Cog   Clock Draw Score 2 Normal   3 Item Recall 1 object recalled   Mini Cog Total Score 3              Signed Electronically by: Jaime Hebert MD  Lung Cancer Screening Shared Decision Making Visit     Jamie Chu, a 73 year old male, is eligible for lung cancer screening    History   Smoking Status    Former    Packs/day: 0.50    Years: 57.00    Types: Cigarettes    Start date: 1965    Quit date: 4/1/2022   Smokeless Tobacco    Never       I have  discussed with patient the risks and benefits of screening for lung cancer with low-dose CT.     The risks include:    radiation exposure: one low dose chest CT has as much ionizing radiation as about 15 chest x-rays, or 6 months of background radiation living in [place]      false positives: most findings/nodules are NOT cancer, but some might still require additional diagnostic evaluation, including biopsy    over-diagnosis: some slow growing cancers that might never have been clinically significant will be detected and treated unnecessarily     The benefit of early detection of lung cancer is contingent upon adherence to annual screening or more frequent follow up if indicated.     Furthermore, to benefit from screening, Jamie must be willing and able to undergo diagnostic procedures, if indicated. Although no specific guide is available for determining severity of comorbidities, it is reasonable to withhold screening in patients who have greater mortality risk from other diseases.     We did discuss that the best way to prevent lung cancer is to not smoke.    Some patients may value a numeric estimation of lung cancer risk when evaluating if lung cancer screening is right for them, here is one calculator:    ShouldIScreen

## 2024-06-15 LAB — HCV AB SERPL QL IA: NONREACTIVE

## 2024-06-17 ENCOUNTER — TELEPHONE (OUTPATIENT)
Dept: FAMILY MEDICINE | Facility: OTHER | Age: 74
End: 2024-06-17

## 2024-06-17 NOTE — TELEPHONE ENCOUNTER
----- Message from Jaime Hebert MD sent at 6/17/2024  7:47 AM CDT -----  Team - please call patient with results.  Blood counts are normal, cholesterol panel at goal, PSA and thyroid are normal as well.  A1c is 6.7% and at goal.  Hep C screening negative as expected.  Creatinine slightly elevated at 1.32, electrolytes and liver function are normal.  Follow-up as already scheduled.

## 2024-06-18 ENCOUNTER — HOSPITAL ENCOUNTER (OUTPATIENT)
Dept: CT IMAGING | Facility: HOSPITAL | Age: 74
Discharge: HOME | End: 2024-06-18
Attending: STUDENT IN AN ORGANIZED HEALTH CARE EDUCATION/TRAINING PROGRAM | Admitting: STUDENT IN AN ORGANIZED HEALTH CARE EDUCATION/TRAINING PROGRAM
Payer: COMMERCIAL

## 2024-06-18 DIAGNOSIS — F17.211 NICOTINE DEPENDENCE, CIGARETTES, IN REMISSION: ICD-10-CM

## 2024-06-18 DIAGNOSIS — Z12.2 ENCOUNTER FOR SCREENING FOR MALIGNANT NEOPLASM OF LUNG: ICD-10-CM

## 2024-06-18 PROCEDURE — 71271 CT THORAX LUNG CANCER SCR C-: CPT

## 2024-06-25 NOTE — PROGRESS NOTES
Assessment & Plan     DIAGNOSIS NOT YET DEFINED  1.5 cm x 1 cm variably pigmented, slightly raised macule right mid back with suspicious features.  Excisional biopsy performed without complication.  Will follow-up for path results.  - trunk, arms, legs  - lidocaine 1% with EPINEPHrine 1:100,000 injection 3 mL  - Surgical Pathology Exam    Procedure:   After informed consent was obtained, using ChloraPrep for cleansing and 1% Lidocaine with epinephrine for anesthetic, with sterile technique elliptical excision in total was performed.  The wound is closed with 6x simple interrupted Ethilon sutures.  Wound base was quite shallow and did not require subcutaneous sutures.  Petroleum dressing is applied, and wound care instructions provided.  Be alert for any signs of cutaneous infection.  The specimen is labeled and sent to pathology for evaluation.  The procedure was well tolerated without complications.    Follow up in 14 days for suture removal.    Lorri Ayala is a 73 year old, presenting for the following health issues:  Procedure (Skin lesion 1.5 x 1cm wide local excision)        6/27/2024     8:45 AM   Additional Questions   Roomed by Tom Martinez   Accompanied by None         6/27/2024     8:45 AM   Patient Reported Additional Medications   Patient reports taking the following new medications None     HPI     Procedure in procedure room (Skin lesion 1.5 x 1 cm excisional biopsy)     -Seen recently for annual physical  -Incidental finding of asymptomatic suspicious lesion on back      Review of Systems  Constitutional, HEENT, cardiovascular, pulmonary, gi and gu systems are negative, except as otherwise noted.      Objective    /70 (BP Location: Left arm, Patient Position: Sitting, Cuff Size: Adult Regular)   Pulse 53   Temp 97.4  F (36.3  C) (Tympanic)   Resp 16   Wt 77.7 kg (171 lb 3.2 oz)   SpO2 99%   BMI 23.22 kg/m    Body mass index is 23.22 kg/m .  Physical Exam  Vitals reviewed.    Constitutional:       Appearance: Normal appearance.   HENT:      Head: Normocephalic and atraumatic.   Musculoskeletal:         General: Normal range of motion.   Skin:     General: Skin is warm and dry.      Comments: 1.5 cm x 1 cm slightly raised irregularly pigmented macule right mid back.   Neurological:      General: No focal deficit present.      Mental Status: He is alert and oriented to person, place, and time.   Psychiatric:         Mood and Affect: Mood normal.         Behavior: Behavior normal.        Signed Electronically by: Jaime Hebert MD

## 2024-06-27 ENCOUNTER — OFFICE VISIT (OUTPATIENT)
Dept: FAMILY MEDICINE | Facility: OTHER | Age: 74
End: 2024-06-27
Attending: STUDENT IN AN ORGANIZED HEALTH CARE EDUCATION/TRAINING PROGRAM
Payer: COMMERCIAL

## 2024-06-27 VITALS
WEIGHT: 171.2 LBS | OXYGEN SATURATION: 99 % | DIASTOLIC BLOOD PRESSURE: 70 MMHG | TEMPERATURE: 97.4 F | RESPIRATION RATE: 16 BRPM | SYSTOLIC BLOOD PRESSURE: 133 MMHG | HEART RATE: 53 BPM | BODY MASS INDEX: 23.22 KG/M2

## 2024-06-27 DIAGNOSIS — Z53.9 DIAGNOSIS NOT YET DEFINED: Primary | ICD-10-CM

## 2024-06-27 PROCEDURE — 88305 TISSUE EXAM BY PATHOLOGIST: CPT | Mod: 26 | Performed by: PATHOLOGY

## 2024-06-27 PROCEDURE — 88305 TISSUE EXAM BY PATHOLOGIST: CPT | Mod: TC | Performed by: STUDENT IN AN ORGANIZED HEALTH CARE EDUCATION/TRAINING PROGRAM

## 2024-06-27 PROCEDURE — 11402 EXC TR-EXT B9+MARG 1.1-2 CM: CPT | Performed by: STUDENT IN AN ORGANIZED HEALTH CARE EDUCATION/TRAINING PROGRAM

## 2024-06-27 PROCEDURE — G0463 HOSPITAL OUTPT CLINIC VISIT: HCPCS | Mod: 25

## 2024-06-27 RX ORDER — LIDOCAINE HYDROCHLORIDE AND EPINEPHRINE 10; 10 MG/ML; UG/ML
3 INJECTION, SOLUTION INFILTRATION; PERINEURAL ONCE
Status: ACTIVE | OUTPATIENT
Start: 2024-06-27

## 2024-06-27 ASSESSMENT — PAIN SCALES - GENERAL: PAINLEVEL: NO PAIN (0)

## 2024-07-01 LAB
PATH REPORT.COMMENTS IMP SPEC: NORMAL
PATH REPORT.FINAL DX SPEC: NORMAL
PATH REPORT.GROSS SPEC: NORMAL
PATH REPORT.MICROSCOPIC SPEC OTHER STN: NORMAL
PATH REPORT.RELEVANT HX SPEC: NORMAL
PHOTO IMAGE: NORMAL

## 2024-07-03 NOTE — PROGRESS NOTES
Assessment & Plan     Basal cell carcinoma (BCC) of skin of other part of torso  Excisional biopsy of suspicious lesion 6/27 on right upper back.  Reviewed pathology results today with patient; basal cell carcinoma nodular type with free margins.  All questions answered.  Sutures removed without complication, Steri-Strips placed, wound surveillance reviewed.  Will connect with dermatology for skin check given new diagnosis skin cancer in addition to his psoriasis.  - REMOVAL OF SUTURES  - Adult Dermatology  Referral; Future    Follow-up as needed.    Lorri Ayala is a 73 year old, presenting for the following health issues:  Suture Removal        7/8/2024     8:43 AM   Additional Questions   Roomed by RANDY Mccann CMA   Accompanied by Self         7/8/2024     8:43 AM   Patient Reported Additional Medications   Patient reports taking the following new medications None     History of Present Illness       Reason for visit:  Suture removal    He eats 0-1 servings of fruits and vegetables daily.He consumes 0 sweetened beverage(s) daily.He exercises with enough effort to increase his heart rate 20 to 29 minutes per day.  He exercises with enough effort to increase his heart rate 4 days per week.   He is taking medications regularly.    -Seen 6/27 for removal of suspicious lesion right upper back  -Denies any interval complication or signs of infection  -Discussed pathology of BCC    Review of Systems  Constitutional, HEENT, cardiovascular, pulmonary, gi and gu systems are negative, except as otherwise noted.      Objective    /60   Pulse 50   Temp 97  F (36.1  C) (Tympanic)   Wt 76.7 kg (169 lb)   SpO2 95%   BMI 22.92 kg/m    Body mass index is 22.92 kg/m .  Physical Exam  Vitals reviewed.   Constitutional:       General: He is not in acute distress.     Appearance: Normal appearance. He is not toxic-appearing.   HENT:      Head: Normocephalic and atraumatic.   Musculoskeletal:          General: Normal range of motion.   Skin:     General: Skin is warm and dry.      Comments: Appropriately healing surgical incision right mid upper back.  6 simple interrupted sutures in place without bleeding or drainage.  No surrounding erythema or edema.   Neurological:      General: No focal deficit present.      Mental Status: He is alert and oriented to person, place, and time.   Psychiatric:         Mood and Affect: Mood normal.         Behavior: Behavior normal.        Signed Electronically by: Jaime Hebert MD

## 2024-07-03 NOTE — PROGRESS NOTES
{PROVIDER CHARTING PREFERENCE:019080}    Lorri Ayala is a 73 year old, presenting for the following health issues:  No chief complaint on file.  {(!) Visit Details have not yet been documented.  Please enter Visit Details and then use this list to pull in documentation. (Optional):367616}  HPI     Suture removal:   Date sutures applied: 6/27/2024         Where (setting) in which they applied:clinic   Description:  Type: sutures  Location: upper back   History:    Cause of laceration: excisional biopsy   Accompanying Signs & Symptoms: (staff: if yes-describe)  Redness: {:712251}  Warmth: {:828784}  Drainage: {:781057}  Still bleeding: {:116974}  Fevers: {:914197}  Last tetanus shot: tetanus status unknown to the patient     {SUPERLIST (Optional):422384}  {additonal problems for provider to add (Optional):595661}    {ROS Picklists (Optional):006434}      Objective    There were no vitals taken for this visit.  There is no height or weight on file to calculate BMI.  Physical Exam   {Exam List (Optional):373943}    {Diagnostic Test Results (Optional):997800}        Signed Electronically by: Jaime Hebert MD  {Email feedback regarding this note to [email or web address]   :[id number]}

## 2024-07-08 ENCOUNTER — OFFICE VISIT (OUTPATIENT)
Dept: FAMILY MEDICINE | Facility: OTHER | Age: 74
End: 2024-07-08
Attending: STUDENT IN AN ORGANIZED HEALTH CARE EDUCATION/TRAINING PROGRAM
Payer: COMMERCIAL

## 2024-07-08 VITALS
TEMPERATURE: 97 F | BODY MASS INDEX: 22.92 KG/M2 | DIASTOLIC BLOOD PRESSURE: 60 MMHG | HEART RATE: 50 BPM | WEIGHT: 169 LBS | SYSTOLIC BLOOD PRESSURE: 138 MMHG | OXYGEN SATURATION: 95 %

## 2024-07-08 DIAGNOSIS — C44.519 BASAL CELL CARCINOMA (BCC) OF SKIN OF OTHER PART OF TORSO: Primary | ICD-10-CM

## 2024-07-08 PROCEDURE — 99213 OFFICE O/P EST LOW 20 MIN: CPT | Performed by: STUDENT IN AN ORGANIZED HEALTH CARE EDUCATION/TRAINING PROGRAM

## 2024-07-08 PROCEDURE — G0463 HOSPITAL OUTPT CLINIC VISIT: HCPCS

## 2024-07-08 ASSESSMENT — PAIN SCALES - GENERAL: PAINLEVEL: NO PAIN (0)

## 2024-08-21 NOTE — PROCEDURE: MEDICATION COUNSELING
Please call patient to schedule a med check, then route to clinical staff. Thank you!      Last Office Visit: 2/29/24  Last Refill: 5/24/24  Return to Clinic: 6 months   Protocol: passed  NOV: n/a  Requested Prescriptions     Pending Prescriptions Disp Refills    ATORVASTATIN 20 MG Oral Tab [Pharmacy Med Name: ATORVASTATIN CALCIUM 20 MG Tablet] 90 tablet 3     Sig: TAKE 1 TABLET EVERY NIGHT (NEED MD APPOINTMENT FOR REFILLS)         Please approve if appropriate.     Thank you!      Birth Control Pills Counseling: Birth Control Pill Counseling: I discussed with the patient the potential side effects of OCPs including but not limited to increased risk of stroke, heart attack, thrombophlebitis, deep venous thrombosis, hepatic adenomas, breast changes, GI upset, headaches, and depression.  The patient verbalized understanding of the proper use and possible adverse effects of OCPs. All of the patient's questions and concerns were addressed.

## 2024-09-13 ENCOUNTER — TELEPHONE (OUTPATIENT)
Dept: FAMILY MEDICINE | Facility: OTHER | Age: 74
End: 2024-09-13

## 2024-09-13 DIAGNOSIS — L40.9 PSORIASIS: Primary | ICD-10-CM

## 2024-09-13 NOTE — TELEPHONE ENCOUNTER
2:13 PM    Reason for Call: Phone Call    Description: mometasona 0.1% cream pt would like to use again pt state it is starting to work please call pt. [facility]    Was an appointment offered for this call? No  If yes : Appointment type              Date    Preferred method for responding to this message: Telephone Call  What is your phone number ? [phone]     If we cannot reach you directly, may we leave a detailed response at the number you provided? Yes    Can this message wait until your PCP/provider returns, if available today? Va Hurtado

## 2024-09-16 RX ORDER — MOMETASONE FUROATE 1 MG/G
OINTMENT TOPICAL DAILY
Qty: 15 G | Refills: 0 | Status: SHIPPED | OUTPATIENT
Start: 2024-09-16

## 2024-10-01 ENCOUNTER — OFFICE VISIT (OUTPATIENT)
Dept: DERMATOLOGY | Facility: OTHER | Age: 74
End: 2024-10-01
Attending: DERMATOLOGY
Payer: COMMERCIAL

## 2024-10-01 VITALS
HEART RATE: 78 BPM | TEMPERATURE: 97.3 F | RESPIRATION RATE: 18 BRPM | OXYGEN SATURATION: 98 % | SYSTOLIC BLOOD PRESSURE: 160 MMHG | DIASTOLIC BLOOD PRESSURE: 62 MMHG

## 2024-10-01 DIAGNOSIS — L40.0 PSORIASIS VULGARIS: Primary | ICD-10-CM

## 2024-10-01 DIAGNOSIS — C44.519 BASAL CELL CARCINOMA (BCC) OF SKIN OF OTHER PART OF TORSO: ICD-10-CM

## 2024-10-01 PROCEDURE — G0463 HOSPITAL OUTPT CLINIC VISIT: HCPCS

## 2024-10-01 PROCEDURE — 99203 OFFICE O/P NEW LOW 30 MIN: CPT | Performed by: DERMATOLOGY

## 2024-10-01 RX ORDER — MOMETASONE FUROATE 1 MG/G
CREAM TOPICAL
Qty: 45 G | Refills: 3 | Status: SHIPPED | OUTPATIENT
Start: 2024-10-01

## 2024-10-01 RX ORDER — TRIAMCINOLONE ACETONIDE 1 MG/G
CREAM TOPICAL
Qty: 454 G | Refills: 3 | Status: SHIPPED | OUTPATIENT
Start: 2024-10-01

## 2024-10-01 ASSESSMENT — PAIN SCALES - GENERAL: PAINLEVEL: NO PAIN (0)

## 2024-10-01 NOTE — LETTER
10/1/2024       RE: Jamie Chu  [address] [facility] [address]     Dear Colleague,    Thank you for referring your patient, Jamie Chu, to the [facility]. Please see a copy of my visit note below.    S: First visit for Jose R who is a gentleman of 74 who states that for many years he has had redness of the palms and soles.  He does not experience fissures but is a somewhat uncomfortable and he has found that the use of Elocon for (mometasone cream) keeps his hands very clear if he uses it regularly..  He does not recall any lesions on his elbows and knees and he is not aware of any particular contacts that would be bearing on this issue.    O: The palms and soles are bright red welled demarcated without scale and without fissuring but are extremely erythematous.    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    P: Recommended a trial of 0.1 triamcinolone cream which is likely to produce the same result but much less expensively.  He receives a 15 g tube of mometasone that only lasts him about a week..  I prescribed 60 g or more of the triamcinolone 0.1 cream with refills.    I asked him to get back to us somehow if the triamcinolone is not very effective and counteracting this particular process.  Another option would be oral acitretin  or Soriatane if the topicals are not effective or produce atrophy manifest as fissuring and tenderness no specific return appointment given but would be glad to see him more discussed the issues on the phone if we choose to go with the oral therapy    15 minutes spent in examination discussion and charting.    Again, thank you for allowing me to participate in the care of your patient.      Sincerely,    ANISH Delacruz MD

## 2024-10-02 NOTE — PROGRESS NOTES
S: First visit for Jose R who is a gentleman of 74 who states that for many years he has had redness of the palms and soles.  He does not experience fissures but is a somewhat uncomfortable and he has found that the use of Elocon for (mometasone cream) keeps his hands very clear if he uses it regularly..  He does not recall any lesions on his elbows and knees and he is not aware of any particular contacts that would be bearing on this issue.    O: The palms and soles are bright red welled demarcated without scale and without fissuring but are extremely erythematous.    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    P: Recommended a trial of 0.1 triamcinolone cream which is likely to produce the same result but much less expensively.  He receives a 15 g tube of mometasone that only lasts him about a week..  I prescribed 60 g or more of the triamcinolone 0.1 cream with refills.    I asked him to get back to us somehow if the triamcinolone is not very effective and counteracting this particular process.  Another option would be oral acitretin  or Soriatane if the topicals are not effective or produce atrophy manifest as fissuring and tenderness no specific return appointment given but would be glad to see him more discussed the issues on the phone if we choose to go with the oral therapy    15 minutes spent in examination discussion and charting.   Health Care Proxy (HCP)

## 2024-10-08 ENCOUNTER — TELEPHONE (OUTPATIENT)
Dept: FAMILY MEDICINE | Facility: OTHER | Age: 74
End: 2024-10-08

## 2024-10-08 DIAGNOSIS — I10 ESSENTIAL (PRIMARY) HYPERTENSION: Primary | ICD-10-CM

## 2024-10-08 NOTE — TELEPHONE ENCOUNTER
Reason for call:  Medication      Have you contacted your pharmacy? Yes   If patient has contacted Pharmacy and it has been over 72hrs, continue to #2  Medication - patient requested through pharmacy and stated denied.  Do not see request in chart.  He would like this filled ASAP.  amLODIPine Besylate NORVASC 10 MG Take 10 mg by mouth daily     What Pharmacy do you use? [facility] [place]      (Please note that the turn-around-time for prescriptions is 72 business hours; I am sending your request at this time. SEND TO appropriate Care Team Pool )

## 2024-10-08 NOTE — TELEPHONE ENCOUNTER
This is prescribed by his Cardiologist, Dr. Morales.   He will need to request through them.   Called patient and informed him. He will call Dr. Morales's office.    Area H Indication Text: Tumors in this location are included in Area H (eyelids, eyebrows, nose, lips, chin, ear, pre-auricular, post-auricular, temple, genitalia, hands, feet, ankles and areola).  Tissue conservation is critical in these anatomic locations.

## 2025-01-08 ENCOUNTER — TRANSFERRED RECORDS (OUTPATIENT)
Dept: HEALTH INFORMATION MANAGEMENT | Facility: HOSPITAL | Age: 75
End: 2025-01-08

## 2025-01-08 LAB
CHOLESTEROL (EXTERNAL): 106 MG/DL (ref 0–200)
HDLC SERPL-MCNC: 41 MG/DL
LDL CHOLESTEROL CALCULATED (EXTERNAL): 44 MG/DL (ref 0–100)
NON HDL CHOLESTEROL (EXTERNAL): 65 MG/DL (ref 0–130)
TRIGLYCERIDES (EXTERNAL): 104 MG/DL

## 2025-01-11 ENCOUNTER — HEALTH MAINTENANCE LETTER (OUTPATIENT)
Age: 75
End: 2025-01-11

## 2025-02-20 DIAGNOSIS — J44.9 CHRONIC OBSTRUCTIVE PULMONARY DISEASE, UNSPECIFIED COPD TYPE (H): ICD-10-CM

## 2025-02-20 RX ORDER — IPRATROPIUM BROMIDE AND ALBUTEROL SULFATE 2.5; .5 MG/3ML; MG/3ML
3 SOLUTION RESPIRATORY (INHALATION) EVERY 6 HOURS
Qty: 90 ML | Refills: 1 | Status: SHIPPED | OUTPATIENT
Start: 2025-02-20

## 2025-02-20 NOTE — TELEPHONE ENCOUNTER
"Signed medication and notified patient. While on the phone with patient, patient requesting follow up with PCP after respiratory illness he got on 2/14/25. Patient reports he was very sick for about 4 days and using Duonebs. Every day since then, he has been improving.   Denies SOB or fever.   Reports he has a productive cough.   States he does \"not really\" have any chest pain.   No other symptoms.   Speaking in full sentences while talking to writer.   Advised patient be seen in ER with any symptoms of SOB or chest pain.   Patient declines and states he does not need to be seen in ER. Wants to see PCP.   Offered appointment for 3/3/25. Patient was wondering if this was too far to wait. Advised patient be seen in ER if needing to be seen. Patient declined.   Offered to schedule patient with a covering provider next week, patient declined and stated he would like to wait for PCP.    Advised ER with new or worsening symptoms. Patient verbalized understanding.   Kept visit for 3/3/25.  "

## 2025-02-20 NOTE — TELEPHONE ENCOUNTER
Reason for call:  Medication    Pt is out and needs, please call pt  Have you contacted your pharmacy? Yes   If patient has contacted Pharmacy and it has been over 72hrs, continue to #2  Medication ipratropium - albuterol 0.5 mg/2.5 mg/3 mL (DUONEB) 0.5-2.5 (3) MG/3ML neb solution   What Pharmacy do you use? [facility] in [place]      (Please note that the turn-around-time for prescriptions is 72 business hours; I am sending your request at this time. SEND TO appropriate Care Team Pool )

## 2025-02-26 ENCOUNTER — HOSPITAL ENCOUNTER (INPATIENT)
Facility: HOSPITAL | Age: 75
End: 2025-02-26
Attending: EMERGENCY MEDICINE | Admitting: INTERNAL MEDICINE
Payer: COMMERCIAL

## 2025-02-26 ENCOUNTER — APPOINTMENT (OUTPATIENT)
Dept: CT IMAGING | Facility: HOSPITAL | Age: 75
DRG: 193 | End: 2025-02-26
Attending: EMERGENCY MEDICINE
Payer: COMMERCIAL

## 2025-02-26 DIAGNOSIS — J44.1 COPD EXACERBATION (H): ICD-10-CM

## 2025-02-26 DIAGNOSIS — R09.02 HYPOXIA: ICD-10-CM

## 2025-02-26 DIAGNOSIS — J18.9 PNEUMONIA OF BOTH LOWER LOBES DUE TO INFECTIOUS ORGANISM: ICD-10-CM

## 2025-02-26 LAB
ALBUMIN SERPL BCG-MCNC: 3.6 G/DL (ref 3.5–5.2)
ALBUMIN UR-MCNC: 100 MG/DL
ALP SERPL-CCNC: 145 U/L (ref 40–150)
ALT SERPL W P-5'-P-CCNC: 49 U/L (ref 0–70)
ANION GAP SERPL CALCULATED.3IONS-SCNC: 15 MMOL/L (ref 7–15)
APPEARANCE UR: CLEAR
AST SERPL W P-5'-P-CCNC: 52 U/L (ref 0–45)
BACTERIA #/AREA URNS HPF: ABNORMAL /HPF
BASE EXCESS BLDV CALC-SCNC: 2.6 MMOL/L (ref -3–3)
BASOPHILS # BLD AUTO: 0 10E3/UL (ref 0–0.2)
BASOPHILS NFR BLD AUTO: 0 %
BILIRUB SERPL-MCNC: 1.6 MG/DL
BILIRUB UR QL STRIP: ABNORMAL
BUN SERPL-MCNC: 18.5 MG/DL (ref 8–23)
CALCIUM SERPL-MCNC: 8.9 MG/DL (ref 8.8–10.4)
CHLORIDE SERPL-SCNC: 95 MMOL/L (ref 98–107)
COLOR UR AUTO: YELLOW
CREAT SERPL-MCNC: 1.16 MG/DL (ref 0.67–1.17)
CRP SERPL-MCNC: 262.03 MG/L
EGFRCR SERPLBLD CKD-EPI 2021: 67 ML/MIN/1.73M2
EOSINOPHIL # BLD AUTO: 0 10E3/UL (ref 0–0.7)
EOSINOPHIL NFR BLD AUTO: 0 %
ERYTHROCYTE [DISTWIDTH] IN BLOOD BY AUTOMATED COUNT: 13.5 % (ref 10–15)
FLUAV RNA SPEC QL NAA+PROBE: NEGATIVE
FLUBV RNA RESP QL NAA+PROBE: NEGATIVE
GLUCOSE BLDC GLUCOMTR-MCNC: 204 MG/DL (ref 70–99)
GLUCOSE SERPL-MCNC: 287 MG/DL (ref 70–99)
GLUCOSE UR STRIP-MCNC: 500 MG/DL
HCO3 BLDV-SCNC: 28 MMOL/L (ref 21–28)
HCO3 SERPL-SCNC: 25 MMOL/L (ref 22–29)
HCT VFR BLD AUTO: 39.8 % (ref 40–53)
HGB BLD-MCNC: 14 G/DL (ref 13.3–17.7)
HGB UR QL STRIP: ABNORMAL
HOLD SPECIMEN: NORMAL
HOLD SPECIMEN: NORMAL
IMM GRANULOCYTES # BLD: 0.2 10E3/UL
IMM GRANULOCYTES NFR BLD: 1 %
KETONES UR STRIP-MCNC: ABNORMAL MG/DL
LACTATE SERPL-SCNC: 1.1 MMOL/L (ref 0.7–2)
LACTATE SERPL-SCNC: 2.4 MMOL/L (ref 0.7–2)
LEUKOCYTE ESTERASE UR QL STRIP: NEGATIVE
LYMPHOCYTES # BLD AUTO: 0.2 10E3/UL (ref 0.8–5.3)
LYMPHOCYTES NFR BLD AUTO: 1 %
MAGNESIUM SERPL-MCNC: 1.6 MG/DL (ref 1.7–2.3)
MCH RBC QN AUTO: 30.5 PG (ref 26.5–33)
MCHC RBC AUTO-ENTMCNC: 35.2 G/DL (ref 31.5–36.5)
MCV RBC AUTO: 87 FL (ref 78–100)
MONOCYTES # BLD AUTO: 1.3 10E3/UL (ref 0–1.3)
MONOCYTES NFR BLD AUTO: 7 %
MUCOUS THREADS #/AREA URNS LPF: PRESENT /LPF
NEUTROPHILS # BLD AUTO: 17.7 10E3/UL (ref 1.6–8.3)
NEUTROPHILS NFR BLD AUTO: 91 %
NITRATE UR QL: NEGATIVE
NRBC # BLD AUTO: 0 10E3/UL
NRBC BLD AUTO-RTO: 0 /100
O2/TOTAL GAS SETTING VFR VENT: 40 %
OXYHGB MFR BLDV: 66 % (ref 70–75)
PCO2 BLDV: 43 MM HG (ref 40–50)
PH BLDV: 7.42 [PH] (ref 7.32–7.43)
PH UR STRIP: 6 [PH] (ref 4.7–8)
PLATELET # BLD AUTO: 263 10E3/UL (ref 150–450)
PO2 BLDV: 37 MM HG (ref 25–47)
POTASSIUM SERPL-SCNC: 3.7 MMOL/L (ref 3.4–5.3)
PROCALCITONIN SERPL IA-MCNC: 0.43 NG/ML
PROT SERPL-MCNC: 7 G/DL (ref 6.4–8.3)
RBC # BLD AUTO: 4.59 10E6/UL (ref 4.4–5.9)
RBC URINE: 7 /HPF
RSV RNA SPEC NAA+PROBE: NEGATIVE
SAO2 % BLDV: 67.6 % (ref 70–75)
SARS-COV-2 RNA RESP QL NAA+PROBE: NEGATIVE
SODIUM SERPL-SCNC: 135 MMOL/L (ref 135–145)
SP GR UR STRIP: 1.01 (ref 1–1.03)
SQUAMOUS EPITHELIAL: 0 /HPF
TROPONIN T SERPL HS-MCNC: 43 NG/L
TROPONIN T SERPL HS-MCNC: 43 NG/L
UROBILINOGEN UR STRIP-MCNC: 8 MG/DL
WBC # BLD AUTO: 19.4 10E3/UL (ref 4–11)
WBC URINE: 2 /HPF

## 2025-02-26 PROCEDURE — 96365 THER/PROPH/DIAG IV INF INIT: CPT

## 2025-02-26 PROCEDURE — 80053 COMPREHEN METABOLIC PANEL: CPT | Performed by: EMERGENCY MEDICINE

## 2025-02-26 PROCEDURE — 99285 EMERGENCY DEPT VISIT HI MDM: CPT | Mod: 25

## 2025-02-26 PROCEDURE — 250N000009 HC RX 250: Performed by: EMERGENCY MEDICINE

## 2025-02-26 PROCEDURE — 71275 CT ANGIOGRAPHY CHEST: CPT

## 2025-02-26 PROCEDURE — 83735 ASSAY OF MAGNESIUM: CPT | Performed by: EMERGENCY MEDICINE

## 2025-02-26 PROCEDURE — 81001 URINALYSIS AUTO W/SCOPE: CPT | Performed by: EMERGENCY MEDICINE

## 2025-02-26 PROCEDURE — 85025 COMPLETE CBC W/AUTO DIFF WBC: CPT | Performed by: EMERGENCY MEDICINE

## 2025-02-26 PROCEDURE — 83605 ASSAY OF LACTIC ACID: CPT | Performed by: EMERGENCY MEDICINE

## 2025-02-26 PROCEDURE — 87040 BLOOD CULTURE FOR BACTERIA: CPT | Performed by: EMERGENCY MEDICINE

## 2025-02-26 PROCEDURE — 99291 CRITICAL CARE FIRST HOUR: CPT | Performed by: EMERGENCY MEDICINE

## 2025-02-26 PROCEDURE — 94640 AIRWAY INHALATION TREATMENT: CPT

## 2025-02-26 PROCEDURE — 82040 ASSAY OF SERUM ALBUMIN: CPT | Performed by: EMERGENCY MEDICINE

## 2025-02-26 PROCEDURE — 250N000011 HC RX IP 250 OP 636: Performed by: EMERGENCY MEDICINE

## 2025-02-26 PROCEDURE — 85048 AUTOMATED LEUKOCYTE COUNT: CPT | Performed by: EMERGENCY MEDICINE

## 2025-02-26 PROCEDURE — 36415 COLL VENOUS BLD VENIPUNCTURE: CPT | Performed by: EMERGENCY MEDICINE

## 2025-02-26 PROCEDURE — 84145 PROCALCITONIN (PCT): CPT | Performed by: EMERGENCY MEDICINE

## 2025-02-26 PROCEDURE — 999N000157 HC STATISTIC RCP TIME EA 10 MIN

## 2025-02-26 PROCEDURE — 250N000013 HC RX MED GY IP 250 OP 250 PS 637: Performed by: EMERGENCY MEDICINE

## 2025-02-26 PROCEDURE — 93010 ELECTROCARDIOGRAM REPORT: CPT | Performed by: INTERNAL MEDICINE

## 2025-02-26 PROCEDURE — 84484 ASSAY OF TROPONIN QUANT: CPT | Performed by: EMERGENCY MEDICINE

## 2025-02-26 PROCEDURE — 87637 SARSCOV2&INF A&B&RSV AMP PRB: CPT | Performed by: EMERGENCY MEDICINE

## 2025-02-26 PROCEDURE — 120N000001 HC R&B MED SURG/OB

## 2025-02-26 PROCEDURE — 96375 TX/PRO/DX INJ NEW DRUG ADDON: CPT

## 2025-02-26 PROCEDURE — 86140 C-REACTIVE PROTEIN: CPT | Performed by: EMERGENCY MEDICINE

## 2025-02-26 PROCEDURE — 82805 BLOOD GASES W/O2 SATURATION: CPT | Performed by: EMERGENCY MEDICINE

## 2025-02-26 PROCEDURE — 93005 ELECTROCARDIOGRAM TRACING: CPT

## 2025-02-26 RX ORDER — IOPAMIDOL 755 MG/ML
58 INJECTION, SOLUTION INTRAVASCULAR ONCE
Status: COMPLETED | OUTPATIENT
Start: 2025-02-26 | End: 2025-02-26

## 2025-02-26 RX ORDER — ATORVASTATIN CALCIUM 20 MG/1
20 TABLET, FILM COATED ORAL
Status: DISPENSED | OUTPATIENT
Start: 2025-02-27

## 2025-02-26 RX ORDER — CEFTRIAXONE SODIUM 2 G/50ML
2 INJECTION, SOLUTION INTRAVENOUS EVERY 24 HOURS
Status: DISPENSED | OUTPATIENT
Start: 2025-02-27

## 2025-02-26 RX ORDER — ASPIRIN 81 MG/1
81 TABLET, CHEWABLE ORAL EVERY MORNING
Status: DISPENSED | OUTPATIENT
Start: 2025-02-27

## 2025-02-26 RX ORDER — ACETAMINOPHEN 325 MG/1
650 TABLET ORAL EVERY 4 HOURS PRN
Status: DISPENSED | OUTPATIENT
Start: 2025-02-26

## 2025-02-26 RX ORDER — AMOXICILLIN 250 MG
2 CAPSULE ORAL 2 TIMES DAILY PRN
Status: ACTIVE | OUTPATIENT
Start: 2025-02-26

## 2025-02-26 RX ORDER — ASPIRIN 325 MG
975 TABLET ORAL DAILY PRN
Status: ON HOLD | COMMUNITY

## 2025-02-26 RX ORDER — CEFTRIAXONE SODIUM 2 G/50ML
2 INJECTION, SOLUTION INTRAVENOUS ONCE
Status: COMPLETED | OUTPATIENT
Start: 2025-02-26 | End: 2025-02-26

## 2025-02-26 RX ORDER — ONDANSETRON 2 MG/ML
4 INJECTION INTRAMUSCULAR; INTRAVENOUS EVERY 6 HOURS PRN
Status: ACTIVE | OUTPATIENT
Start: 2025-02-26

## 2025-02-26 RX ORDER — ACETAMINOPHEN 325 MG/1
650 TABLET ORAL
Status: DISPENSED | OUTPATIENT
Start: 2025-02-26

## 2025-02-26 RX ORDER — NICOTINE POLACRILEX 4 MG
15-30 LOZENGE BUCCAL
Status: ACTIVE | OUTPATIENT
Start: 2025-02-26

## 2025-02-26 RX ORDER — BENZONATATE 100 MG/1
100 CAPSULE ORAL 3 TIMES DAILY PRN
Status: DISPENSED | OUTPATIENT
Start: 2025-02-26

## 2025-02-26 RX ORDER — METOPROLOL SUCCINATE 50 MG/1
50 TABLET, EXTENDED RELEASE ORAL EVERY MORNING
Status: DISPENSED | OUTPATIENT
Start: 2025-02-27

## 2025-02-26 RX ORDER — ONDANSETRON 4 MG/1
4 TABLET, ORALLY DISINTEGRATING ORAL EVERY 6 HOURS PRN
Status: ACTIVE | OUTPATIENT
Start: 2025-02-26

## 2025-02-26 RX ORDER — IPRATROPIUM BROMIDE AND ALBUTEROL SULFATE 2.5; .5 MG/3ML; MG/3ML
1 SOLUTION RESPIRATORY (INHALATION) EVERY 6 HOURS PRN
Status: ACTIVE | OUTPATIENT
Start: 2025-02-26

## 2025-02-26 RX ORDER — IPRATROPIUM BROMIDE AND ALBUTEROL SULFATE 2.5; .5 MG/3ML; MG/3ML
1 SOLUTION RESPIRATORY (INHALATION) EVERY 6 HOURS PRN
Status: ON HOLD | COMMUNITY

## 2025-02-26 RX ORDER — IPRATROPIUM BROMIDE AND ALBUTEROL SULFATE 2.5; .5 MG/3ML; MG/3ML
3 SOLUTION RESPIRATORY (INHALATION)
Status: DISPENSED | OUTPATIENT
Start: 2025-02-27

## 2025-02-26 RX ORDER — CALCIUM CARBONATE 500 MG/1
1000 TABLET, CHEWABLE ORAL 4 TIMES DAILY PRN
Status: ACTIVE | OUTPATIENT
Start: 2025-02-26

## 2025-02-26 RX ORDER — IPRATROPIUM BROMIDE AND ALBUTEROL SULFATE 2.5; .5 MG/3ML; MG/3ML
SOLUTION RESPIRATORY (INHALATION)
Status: DISCONTINUED
Start: 2025-02-26 | End: 2025-02-26 | Stop reason: HOSPADM

## 2025-02-26 RX ORDER — LIDOCAINE 40 MG/G
CREAM TOPICAL
Status: ACTIVE | OUTPATIENT
Start: 2025-02-26

## 2025-02-26 RX ORDER — IPRATROPIUM BROMIDE AND ALBUTEROL SULFATE 2.5; .5 MG/3ML; MG/3ML
3 SOLUTION RESPIRATORY (INHALATION) ONCE
Status: COMPLETED | OUTPATIENT
Start: 2025-02-26 | End: 2025-02-26

## 2025-02-26 RX ORDER — DOXYCYCLINE 100 MG/10ML
100 INJECTION, POWDER, LYOPHILIZED, FOR SOLUTION INTRAVENOUS ONCE
Status: COMPLETED | OUTPATIENT
Start: 2025-02-26 | End: 2025-02-26

## 2025-02-26 RX ORDER — DOXYCYCLINE 100 MG/10ML
100 INJECTION, POWDER, LYOPHILIZED, FOR SOLUTION INTRAVENOUS EVERY 12 HOURS
Status: DISPENSED | OUTPATIENT
Start: 2025-02-27

## 2025-02-26 RX ORDER — DEXTROSE MONOHYDRATE 25 G/50ML
25-50 INJECTION, SOLUTION INTRAVENOUS
Status: ACTIVE | OUTPATIENT
Start: 2025-02-26

## 2025-02-26 RX ORDER — METFORMIN HYDROCHLORIDE 500 MG/1
500 TABLET, EXTENDED RELEASE ORAL 2 TIMES DAILY WITH MEALS
Status: ON HOLD | COMMUNITY
Start: 2025-01-22

## 2025-02-26 RX ORDER — AMOXICILLIN 250 MG
1 CAPSULE ORAL 2 TIMES DAILY PRN
Status: ACTIVE | OUTPATIENT
Start: 2025-02-26

## 2025-02-26 RX ORDER — ACETAMINOPHEN 650 MG/1
650 SUPPOSITORY RECTAL
Status: ACTIVE | OUTPATIENT
Start: 2025-02-26

## 2025-02-26 RX ORDER — GUAIFENESIN/DEXTROMETHORPHAN 100-10MG/5
10 SYRUP ORAL EVERY 4 HOURS PRN
Status: DISCONTINUED | OUTPATIENT
Start: 2025-02-26 | End: 2025-02-27

## 2025-02-26 RX ORDER — AMLODIPINE BESYLATE 5 MG/1
10 TABLET ORAL EVERY MORNING
Status: DISPENSED | OUTPATIENT
Start: 2025-02-27

## 2025-02-26 RX ADMIN — CEFTRIAXONE SODIUM 2 G: 2 INJECTION, SOLUTION INTRAVENOUS at 17:25

## 2025-02-26 RX ADMIN — ACETAMINOPHEN 650 MG: 325 TABLET, FILM COATED ORAL at 17:25

## 2025-02-26 RX ADMIN — DOXYCYCLINE 100 MG: 100 INJECTION, POWDER, LYOPHILIZED, FOR SOLUTION INTRAVENOUS at 18:18

## 2025-02-26 RX ADMIN — IPRATROPIUM BROMIDE AND ALBUTEROL SULFATE 3 ML: .5; 3 SOLUTION RESPIRATORY (INHALATION) at 16:45

## 2025-02-26 RX ADMIN — IOPAMIDOL 58 ML: 755 INJECTION, SOLUTION INTRAVENOUS at 17:33

## 2025-02-26 ASSESSMENT — ACTIVITIES OF DAILY LIVING (ADL)
ADLS_ACUITY_SCORE: 48
ADLS_ACUITY_SCORE: 28
ADLS_ACUITY_SCORE: 28
ADLS_ACUITY_SCORE: 48

## 2025-02-26 ASSESSMENT — COLUMBIA-SUICIDE SEVERITY RATING SCALE - C-SSRS
2. HAVE YOU ACTUALLY HAD ANY THOUGHTS OF KILLING YOURSELF IN THE PAST MONTH?: NO
1. IN THE PAST MONTH, HAVE YOU WISHED YOU WERE DEAD OR WISHED YOU COULD GO TO SLEEP AND NOT WAKE UP?: NO
6. HAVE YOU EVER DONE ANYTHING, STARTED TO DO ANYTHING, OR PREPARED TO DO ANYTHING TO END YOUR LIFE?: NO

## 2025-02-26 NOTE — ED TRIAGE NOTES
Pt has been sick for 5 days or so, coughing up green mucus. Pt O2 sat was 78% on RA after rest and lying down, 70-71% upon first check. Now is 92% on 6L NC.

## 2025-02-26 NOTE — ED PROVIDER NOTES
History     Chief Complaint   Patient presents with    Cough    Shortness of Breath     HPI  Jamie Chu is a 74 year old male who has a history of COPD former smoker diabetes melitis type II chronic congestive diastolic heart failure who presents to the ED with 4 to 5-day history of productive cough of thick green sputum chills sweats fever shortness of breath.  His nebulizers have been helping him at home.  He does not take oxygen at home.  His O2 sats were in the 70s on arrival on room air.  He has had some slight pressure on his chest but no actual pain.  No peripheral edema.  He is otherwise asymptomatic.  Allergies:  No Known Allergies    Problem List:    Patient Active Problem List    Diagnosis Date Noted    Renal artery stenosis 10/18/2023     Priority: Medium    PVC's (premature ventricular contractions) 07/17/2023     Priority: Medium    Stenosis of right carotid artery 07/17/2023     Priority: Medium    Psoriasis 01/25/2023     Priority: Medium    Tinnitus, bilateral 01/25/2023     Priority: Medium    Chronic diastolic congestive heart failure (H) 11/07/2022     Priority: Medium    Coronary artery disease involving native coronary artery of native heart without angina pectoris 11/07/2022     Priority: Medium    Dyslipidemia 11/07/2022     Priority: Medium    Abdominal bruit 11/07/2022     Priority: Medium    Left carotid bruit 11/07/2022     Priority: Medium    Chronic obstructive pulmonary disease, unspecified (H) 04/05/2022     Priority: Medium    Essential (primary) hypertension 04/05/2022     Priority: Medium    Myocardial infarction (H) 04/05/2022     Priority: Medium    Former smoker 04/05/2022     Priority: Medium    Type 2 diabetes mellitus without complication, without long-term current use of insulin (H) 04/05/2022     Priority: Medium        Past Medical History:    Past Medical History:   Diagnosis Date    Acute respiratory failure with hypoxia (H) 1/16/2023    Pneumonia of left lower  lobe due to infectious organism 2023       Past Surgical History:    History reviewed. No pertinent surgical history.    Family History:    History reviewed. No pertinent family history.    Social History:  Marital Status:   [4]  Social History     Tobacco Use    Smoking status: Former     Current packs/day: 0.00     Average packs/day: 0.5 packs/day for 57.2 years (28.6 ttl pk-yrs)     Types: Cigarettes     Start date: 1965     Quit date: 2022     Years since quittin.9     Passive exposure: Past    Smokeless tobacco: Never   Vaping Use    Vaping status: Never Used   Substance Use Topics    Alcohol use: Not Currently    Drug use: Never        Medications:    amLODIPine (NORVASC) 10 MG tablet  aspirin (ASA) 81 MG chewable tablet  atorvastatin (LIPITOR) 20 MG tablet  betamethasone dipropionate (DIPROSONE) 0.05 % external ointment  ipratropium - albuterol 0.5 mg/2.5 mg/3 mL (DUONEB) 0.5-2.5 (3) MG/3ML neb solution  metFORMIN (GLUCOPHAGE) 500 MG tablet  metoprolol succinate ER (TOPROL XL) 50 MG 24 hr tablet  mometasone (ELOCON) 0.1 % external cream  mometasone (ELOCON) 0.1 % external ointment  nitroGLYcerin (NITROSTAT) 0.4 MG sublingual tablet  triamcinolone (KENALOG) 0.1 % external cream          Review of Systems   All other systems reviewed and are negative.      Physical Exam   BP: (!) 146/82  Pulse: 115  Temp: 100.6  F (38.1  C)  Resp: 24  Weight: 74.8 kg (164 lb 14.5 oz)  SpO2: (!) 78 %      Physical Exam  Vitals and nursing note reviewed.   Constitutional:       General: He is not in acute distress.     Appearance: Normal appearance. He is ill-appearing. He is not toxic-appearing or diaphoretic.   HENT:      Head: Normocephalic and atraumatic.   Eyes:      General: No scleral icterus.     Extraocular Movements: Extraocular movements intact.      Conjunctiva/sclera: Conjunctivae normal.   Cardiovascular:      Rate and Rhythm: Regular rhythm. Tachycardia present.      Heart sounds: Normal  heart sounds.   Pulmonary:      Effort: Tachypnea and respiratory distress present.      Breath sounds: Examination of the right-middle field reveals rhonchi. Examination of the left-middle field reveals rhonchi and rales. Examination of the right-lower field reveals rhonchi. Examination of the left-lower field reveals rhonchi and rales. Decreased breath sounds, rhonchi and rales present.   Abdominal:      General: Bowel sounds are normal.      Palpations: Abdomen is soft.      Tenderness: There is no abdominal tenderness.   Musculoskeletal:         General: No deformity.      Cervical back: Normal range of motion and neck supple.      Right lower leg: No edema.      Left lower leg: No edema.   Skin:     General: Skin is warm and dry.      Capillary Refill: Capillary refill takes less than 2 seconds.   Neurological:      General: No focal deficit present.      Mental Status: He is alert and oriented to person, place, and time.   Psychiatric:         Mood and Affect: Mood normal.         Behavior: Behavior normal.         ED Course        Procedures              Critical Care time:  was 30 minutes for this patient excluding procedures.    None       EKG done at 1619 reviewed at 1619 agree with below interpretation  Results for orders placed or performed during the hospital encounter of 02/26/25 (from the past 24 hours)   EKG 12-lead, tracing only   Result Value Ref Range    Systolic Blood Pressure  mmHg    Diastolic Blood Pressure  mmHg    Ventricular Rate 114 BPM    Atrial Rate 114 BPM    CA Interval 124 ms    QRS Duration 80 ms     ms    QTc 419 ms    P Axis 83 degrees    R AXIS 59 degrees    T Axis 79 degrees    Interpretation ECG       Sinus tachycardia  Nonspecific ST abnormality  Abnormal ECG  No previous ECGs available     CBC with platelets differential    Narrative    The following orders were created for panel order CBC with platelets differential.  Procedure                               Abnormality          Status                     ---------                               -----------         ------                     CBC with platelets and d...[[id number]]  Abnormal            Final result                 Please view results for these tests on the individual orders.   Comprehensive metabolic panel   Result Value Ref Range    Sodium 135 135 - 145 mmol/L    Potassium 3.7 3.4 - 5.3 mmol/L    Carbon Dioxide (CO2) 25 22 - 29 mmol/L    Anion Gap 15 7 - 15 mmol/L    Urea Nitrogen 18.5 8.0 - 23.0 mg/dL    Creatinine 1.16 0.67 - 1.17 mg/dL    GFR Estimate 67 >60 mL/min/1.73m2    Calcium 8.9 8.8 - 10.4 mg/dL    Chloride 95 (L) 98 - 107 mmol/L    Glucose 287 (H) 70 - 99 mg/dL    Alkaline Phosphatase 145 40 - 150 U/L    AST 52 (H) 0 - 45 U/L    ALT 49 0 - 70 U/L    Protein Total 7.0 6.4 - 8.3 g/dL    Albumin 3.6 3.5 - 5.2 g/dL    Bilirubin Total 1.6 (H) <=1.2 mg/dL   Lactic Acid Whole Blood with 1X Repeat in 2 HR when >2   Result Value Ref Range    Lactic Acid, Initial 2.4 (H) 0.7 - 2.0 mmol/L   Blood gas venous   Result Value Ref Range    pH Venous 7.42 7.32 - 7.43    pCO2 Venous 43 40 - 50 mm Hg    pO2 Venous 37 25 - 47 mm Hg    Bicarbonate Venous 28 21 - 28 mmol/L    Base Excess/Deficit Venous 2.6 -3.0 - 3.0 mmol/L    FIO2 40     Oxyhemoglobin Venous 66 (L) 70 - 75 %    O2 Sat, Venous 67.6 (L) 70.0 - 75.0 %    Narrative    In healthy individuals, oxyhemoglobin (O2Hb) and oxygen saturation (SO2) are approximately equal. In the presence of dyshemoglobins, oxyhemoglobin can be considerably lower than oxygen saturation.   Procalcitonin   Result Value Ref Range    Procalcitonin 0.43 <0.50 ng/mL   Troponin T, High Sensitivity   Result Value Ref Range    Troponin T, High Sensitivity 43 (H) <=22 ng/L   Magnesium   Result Value Ref Range    Magnesium 1.6 (L) 1.7 - 2.3 mg/dL   CRP inflammation   Result Value Ref Range    CRP Inflammation 262.03 (H) <5.00 mg/L   CBC with platelets and differential   Result Value Ref Range    WBC  Count 19.4 (H) 4.0 - 11.0 10e3/uL    RBC Count 4.59 4.40 - 5.90 10e6/uL    Hemoglobin 14.0 13.3 - 17.7 g/dL    Hematocrit 39.8 (L) 40.0 - 53.0 %    MCV 87 78 - 100 fL    MCH 30.5 26.5 - 33.0 pg    MCHC 35.2 31.5 - 36.5 g/dL    RDW 13.5 10.0 - 15.0 %    Platelet Count 263 150 - 450 10e3/uL    % Neutrophils 91 %    % Lymphocytes 1 %    % Monocytes 7 %    % Eosinophils 0 %    % Basophils 0 %    % Immature Granulocytes 1 %    NRBCs per 100 WBC 0 <1 /100    Absolute Neutrophils 17.7 (H) 1.6 - 8.3 10e3/uL    Absolute Lymphocytes 0.2 (L) 0.8 - 5.3 10e3/uL    Absolute Monocytes 1.3 0.0 - 1.3 10e3/uL    Absolute Eosinophils 0.0 0.0 - 0.7 10e3/uL    Absolute Basophils 0.0 0.0 - 0.2 10e3/uL    Absolute Immature Granulocytes 0.2 <=0.4 10e3/uL    Absolute NRBCs 0.0 10e3/uL   Extra Tube    Narrative    The following orders were created for panel order Extra Tube.  Procedure                               Abnormality         Status                     ---------                               -----------         ------                     Extra Blue Top Tube[[id number]]                              Final result               Extra Red Top Tube[[id number]]                               Final result                 Please view results for these tests on the individual orders.   Extra Blue Top Tube   Result Value Ref Range    Hold Specimen JIC    Extra Red Top Tube   Result Value Ref Range    Hold Specimen JIC    Influenza A/B, RSV and SARS-CoV2 PCR (COVID-19) Nose    Specimen: Nose; Swab   Result Value Ref Range    Influenza A PCR Negative Negative    Influenza B PCR Negative Negative    RSV PCR Negative Negative    SARS CoV2 PCR Negative Negative    Narrative    Testing was performed using the Xpert Xpress CoV2/Flu/RSV Assay on the Cepheid GeneXpert Instrument. This test should be ordered for the detection of SARS-CoV2, influenza, and RSV viruses in individuals with signs and symptoms of respiratory tract infection. This test is for in  vitro diagnostic use under the [place] FDA for laboratories certified under CLIA to perform high or moderate complexity testing. This test has been [place] FDA cleared. A negative result does not rule out the presence of PCR inhibitors in the specimen or target RNA in concentration below the limit of detection for the assay. If only one viral target is positive but coinfection with multiple targets is suspected, the sample should be re-tested with another FDA cleared, approved, or authorized test, if coninfection would change clinical management. This test was validated by the [facility]. These laboratories are certified under the Clinical Laboratory Improvement Amendments of 1988 (CLIA-88) as qualified to perfom high complexity laboratory testing.   CT Chest Pulmonary Embolism w Contrast    Narrative    EXAM: CT CHEST PULMONARY EMBOLISM W CONTRAST  LOCATION: [facility]  DATE: 2/26/2025    INDICATION: 4 days of productive cough, copd, fever hypoxia ro pneumonia and PE  COMPARISON: 6/18/2024  TECHNIQUE: CT chest pulmonary angiogram during arterial phase injection of IV contrast. Multiplanar reformats and MIP reconstructions were performed. Dose reduction techniques were used.   CONTRAST: [id number]   58ML    FINDINGS:  ANGIOGRAM CHEST: Pulmonary arteries are normal caliber and negative for pulmonary emboli. Thoracic aorta is negative for dissection. No CT evidence of right heart strain.    LUNGS AND PLEURA: Patchy consolidation and nodular opacities within both lower lobes with corresponding bronchial wall thickening and endobronchial opacities. Tree-in-bud opacities also present in the lingula. No pleural effusion or pneumothorax.   Moderate upper lung predominant centrilobular emphysema.    MEDIASTINUM/AXILLAE: Normal.    CORONARY ARTERY CALCIFICATION: Moderate.    UPPER ABDOMEN: Normal.    MUSCULOSKELETAL: Surgical hardware in the right clavicle. Degenerative changes in the spine.       Impression    IMPRESSION:  1.  No pulmonary embolism.  2.  Bilateral lower lobe pneumonia with associated endobronchial opacities and bronchial wall thickening, which suggests aspiration or retained secretions.         Medications   sodium chloride (PF) 0.9% PF flush 3 mL (has no administration in time range)   sodium chloride (PF) 0.9% PF flush 3 mL (has no administration in time range)   doxycycline (VIBRAMYCIN) 100 mg vial to attach to  mL bag (100 mg Intravenous $New Bag 2/26/25 1818)   acetaminophen (TYLENOL) tablet 650 mg (650 mg Oral $Given 2/26/25 1725)     Or   acetaminophen (TYLENOL) Suppository 650 mg ( Rectal See Alternative 2/26/25 1725)   ipratropium - albuterol 0.5 mg/2.5 mg/3 mL (DUONEB) 0.5-2.5 (3) MG/3ML neb solution (has no administration in time range)   cefTRIAXone IN D5W (ROCEPHIN) intermittent infusion 2 g (0 g Intravenous Stopped 2/26/25 1731)   ipratropium - albuterol 0.5 mg/2.5 mg/3 mL (DUONEB) neb solution 3 mL (3 mLs Nebulization $Given 2/26/25 1645)   sodium chloride (PF) 0.9% PF flush 100 mL (50 mLs Intravenous $Given 2/26/25 1733)   iopamidol (ISOVUE-370) solution 58 mL (58 mLs Intravenous $Given 2/26/25 1733)       Assessments & Plan (with Medical Decision Making)     I have reviewed the nursing notes.    I have reviewed the findings, diagnosis, plan and need for follow up with the patient.           Medical Decision Making  The patient's presentation was of high complexity (an acute health issue posing potential threat to life or bodily function).    The patient's evaluation involved:  ordering and/or review of 3+ test(s) in this encounter (EKG CT scan multiple lab tests)    The patient's management necessitated high risk (a decision regarding hospitalization).  Patient has bilateral pneumonia seen on CT no pulmonary embolism with concurrent comorbid factors of COPD and significant hypoxia on arrival will be a candidate for admission to hospital.  Patient's received IV  antibiotics in the ED antipyretics.  He has been remained normotensive tachycardia is improved.  He is agreeable to plan.  Shift change is about to occur we will transfer care to change of shift to Dr. Harkins at 1900 hrs. and she will discuss case with nocturnist.    New Prescriptions    No medications on file       Final diagnoses:   Hypoxia   COPD exacerbation (H)       2/26/2025   [facility]       Jaskaran Alaniz MD  02/26/25 [id number]

## 2025-02-27 ENCOUNTER — APPOINTMENT (OUTPATIENT)
Dept: SPEECH THERAPY | Facility: HOSPITAL | Age: 75
DRG: 193 | End: 2025-02-27
Attending: INTERNAL MEDICINE
Payer: COMMERCIAL

## 2025-02-27 VITALS
WEIGHT: 155.2 LBS | HEIGHT: 72 IN | TEMPERATURE: 99.6 F | HEART RATE: 89 BPM | DIASTOLIC BLOOD PRESSURE: 64 MMHG | RESPIRATION RATE: 18 BRPM | OXYGEN SATURATION: 88 % | BODY MASS INDEX: 21.02 KG/M2 | SYSTOLIC BLOOD PRESSURE: 141 MMHG

## 2025-02-27 LAB
ALBUMIN SERPL BCG-MCNC: 3 G/DL (ref 3.5–5.2)
ALP SERPL-CCNC: 124 U/L (ref 40–150)
ALT SERPL W P-5'-P-CCNC: 40 U/L (ref 0–70)
ANION GAP SERPL CALCULATED.3IONS-SCNC: 8 MMOL/L (ref 7–15)
AST SERPL W P-5'-P-CCNC: 36 U/L (ref 0–45)
ATRIAL RATE - MUSE: 114 BPM
BACTERIA BLD CULT: NORMAL
BACTERIA BLD CULT: NORMAL
BASOPHILS # BLD AUTO: 0 10E3/UL (ref 0–0.2)
BASOPHILS NFR BLD AUTO: 0 %
BILIRUB SERPL-MCNC: 0.9 MG/DL
BUN SERPL-MCNC: 16.7 MG/DL (ref 8–23)
CALCIUM SERPL-MCNC: 8.5 MG/DL (ref 8.8–10.4)
CHLORIDE SERPL-SCNC: 99 MMOL/L (ref 98–107)
CREAT SERPL-MCNC: 1.12 MG/DL (ref 0.67–1.17)
DIASTOLIC BLOOD PRESSURE - MUSE: NORMAL MMHG
EGFRCR SERPLBLD CKD-EPI 2021: 69 ML/MIN/1.73M2
EOSINOPHIL # BLD AUTO: 0 10E3/UL (ref 0–0.7)
EOSINOPHIL NFR BLD AUTO: 0 %
ERYTHROCYTE [DISTWIDTH] IN BLOOD BY AUTOMATED COUNT: 13.2 % (ref 10–15)
GLUCOSE BLDC GLUCOMTR-MCNC: 173 MG/DL (ref 70–99)
GLUCOSE BLDC GLUCOMTR-MCNC: 178 MG/DL (ref 70–99)
GLUCOSE BLDC GLUCOMTR-MCNC: 210 MG/DL (ref 70–99)
GLUCOSE BLDC GLUCOMTR-MCNC: 236 MG/DL (ref 70–99)
GLUCOSE BLDC GLUCOMTR-MCNC: 293 MG/DL (ref 70–99)
GLUCOSE SERPL-MCNC: 253 MG/DL (ref 70–99)
HCO3 SERPL-SCNC: 29 MMOL/L (ref 22–29)
HCT VFR BLD AUTO: 34.8 % (ref 40–53)
HGB BLD-MCNC: 12 G/DL (ref 13.3–17.7)
IMM GRANULOCYTES # BLD: 0.1 10E3/UL
IMM GRANULOCYTES NFR BLD: 1 %
INTERPRETATION ECG - MUSE: NORMAL
LYMPHOCYTES # BLD AUTO: 0.5 10E3/UL (ref 0.8–5.3)
LYMPHOCYTES NFR BLD AUTO: 3 %
MCH RBC QN AUTO: 30.2 PG (ref 26.5–33)
MCHC RBC AUTO-ENTMCNC: 34.5 G/DL (ref 31.5–36.5)
MCV RBC AUTO: 88 FL (ref 78–100)
MONOCYTES # BLD AUTO: 1.4 10E3/UL (ref 0–1.3)
MONOCYTES NFR BLD AUTO: 9 %
NEUTROPHILS # BLD AUTO: 13.6 10E3/UL (ref 1.6–8.3)
NEUTROPHILS NFR BLD AUTO: 87 %
NRBC # BLD AUTO: 0 10E3/UL
NRBC BLD AUTO-RTO: 0 /100
P AXIS - MUSE: 83 DEGREES
PLATELET # BLD AUTO: 250 10E3/UL (ref 150–450)
POTASSIUM SERPL-SCNC: 4.1 MMOL/L (ref 3.4–5.3)
PR INTERVAL - MUSE: 124 MS
PROT SERPL-MCNC: 6 G/DL (ref 6.4–8.3)
QRS DURATION - MUSE: 80 MS
QT - MUSE: 304 MS
QTC - MUSE: 419 MS
R AXIS - MUSE: 59 DEGREES
RBC # BLD AUTO: 3.97 10E6/UL (ref 4.4–5.9)
SODIUM SERPL-SCNC: 136 MMOL/L (ref 135–145)
SYSTOLIC BLOOD PRESSURE - MUSE: NORMAL MMHG
T AXIS - MUSE: 79 DEGREES
VENTRICULAR RATE- MUSE: 114 BPM
WBC # BLD AUTO: 15.6 10E3/UL (ref 4–11)

## 2025-02-27 PROCEDURE — 94799 UNLISTED PULMONARY SVC/PX: CPT

## 2025-02-27 PROCEDURE — 36415 COLL VENOUS BLD VENIPUNCTURE: CPT | Performed by: INTERNAL MEDICINE

## 2025-02-27 PROCEDURE — 250N000011 HC RX IP 250 OP 636: Performed by: INTERNAL MEDICINE

## 2025-02-27 PROCEDURE — 94640 AIRWAY INHALATION TREATMENT: CPT

## 2025-02-27 PROCEDURE — 85048 AUTOMATED LEUKOCYTE COUNT: CPT | Performed by: INTERNAL MEDICINE

## 2025-02-27 PROCEDURE — 84155 ASSAY OF PROTEIN SERUM: CPT | Performed by: INTERNAL MEDICINE

## 2025-02-27 PROCEDURE — 92610 EVALUATE SWALLOWING FUNCTION: CPT | Mod: GN

## 2025-02-27 PROCEDURE — 80053 COMPREHEN METABOLIC PANEL: CPT | Performed by: INTERNAL MEDICINE

## 2025-02-27 PROCEDURE — 250N000013 HC RX MED GY IP 250 OP 250 PS 637: Performed by: INTERNAL MEDICINE

## 2025-02-27 PROCEDURE — 999N000157 HC STATISTIC RCP TIME EA 10 MIN

## 2025-02-27 PROCEDURE — 85004 AUTOMATED DIFF WBC COUNT: CPT | Performed by: INTERNAL MEDICINE

## 2025-02-27 PROCEDURE — 82040 ASSAY OF SERUM ALBUMIN: CPT | Performed by: INTERNAL MEDICINE

## 2025-02-27 PROCEDURE — 120N000001 HC R&B MED SURG/OB

## 2025-02-27 PROCEDURE — 94640 AIRWAY INHALATION TREATMENT: CPT | Mod: 76

## 2025-02-27 PROCEDURE — 250N000009 HC RX 250: Performed by: INTERNAL MEDICINE

## 2025-02-27 PROCEDURE — 250N000012 HC RX MED GY IP 250 OP 636 PS 637: Performed by: INTERNAL MEDICINE

## 2025-02-27 RX ORDER — CODEINE PHOSPHATE AND GUAIFENESIN 10; 100 MG/5ML; MG/5ML
5 SOLUTION ORAL EVERY 4 HOURS PRN
Status: ACTIVE | OUTPATIENT
Start: 2025-02-27

## 2025-02-27 RX ADMIN — CEFTRIAXONE SODIUM 2 G: 2 INJECTION, SOLUTION INTRAVENOUS at 16:08

## 2025-02-27 RX ADMIN — METOPROLOL SUCCINATE 50 MG: 50 TABLET, EXTENDED RELEASE ORAL at 09:26

## 2025-02-27 RX ADMIN — INSULIN ASPART 4 UNITS: 100 INJECTION, SOLUTION INTRAVENOUS; SUBCUTANEOUS at 16:44

## 2025-02-27 RX ADMIN — BENZOCAINE AND MENTHOL 1 LOZENGE: 15; 3.6 LOZENGE ORAL at 09:28

## 2025-02-27 RX ADMIN — ATORVASTATIN CALCIUM 20 MG: 20 TABLET, FILM COATED ORAL at 16:49

## 2025-02-27 RX ADMIN — AMLODIPINE BESYLATE 10 MG: 5 TABLET ORAL at 09:26

## 2025-02-27 RX ADMIN — BENZONATATE 100 MG: 100 CAPSULE ORAL at 09:27

## 2025-02-27 RX ADMIN — IPRATROPIUM BROMIDE AND ALBUTEROL SULFATE 3 ML: .5; 3 SOLUTION RESPIRATORY (INHALATION) at 09:34

## 2025-02-27 RX ADMIN — ASPIRIN 81 MG CHEWABLE TABLET 81 MG: 81 TABLET CHEWABLE at 09:26

## 2025-02-27 RX ADMIN — BENZONATATE 100 MG: 100 CAPSULE ORAL at 23:30

## 2025-02-27 RX ADMIN — DOXYCYCLINE 100 MG: 100 INJECTION, POWDER, LYOPHILIZED, FOR SOLUTION INTRAVENOUS at 18:19

## 2025-02-27 RX ADMIN — IPRATROPIUM BROMIDE AND ALBUTEROL SULFATE 3 ML: .5; 3 SOLUTION RESPIRATORY (INHALATION) at 19:30

## 2025-02-27 RX ADMIN — ATORVASTATIN CALCIUM 20 MG: 20 TABLET, FILM COATED ORAL at 00:20

## 2025-02-27 RX ADMIN — ACETAMINOPHEN 650 MG: 325 TABLET, FILM COATED ORAL at 16:05

## 2025-02-27 RX ADMIN — ACETAMINOPHEN 650 MG: 325 TABLET, FILM COATED ORAL at 23:29

## 2025-02-27 RX ADMIN — DOXYCYCLINE 100 MG: 100 INJECTION, POWDER, LYOPHILIZED, FOR SOLUTION INTRAVENOUS at 05:31

## 2025-02-27 RX ADMIN — IPRATROPIUM BROMIDE AND ALBUTEROL SULFATE 3 ML: .5; 3 SOLUTION RESPIRATORY (INHALATION) at 15:29

## 2025-02-27 RX ADMIN — INSULIN ASPART 1 UNITS: 100 INJECTION, SOLUTION INTRAVENOUS; SUBCUTANEOUS at 09:54

## 2025-02-27 ASSESSMENT — ACTIVITIES OF DAILY LIVING (ADL)
ADLS_ACUITY_SCORE: 28
ADLS_ACUITY_SCORE: 28
ADLS_ACUITY_SCORE: 29
ADLS_ACUITY_SCORE: 27
ADLS_ACUITY_SCORE: 28
ADLS_ACUITY_SCORE: 29
ADLS_ACUITY_SCORE: 27
ADLS_ACUITY_SCORE: 29
ADLS_ACUITY_SCORE: 27
ADLS_ACUITY_SCORE: 28
ADLS_ACUITY_SCORE: 28
ADLS_ACUITY_SCORE: 29
ADLS_ACUITY_SCORE: 27
ADLS_ACUITY_SCORE: 29
ADLS_ACUITY_SCORE: 28
ADLS_ACUITY_SCORE: 27
ADLS_ACUITY_SCORE: 28
ADLS_ACUITY_SCORE: 28

## 2025-02-27 NOTE — PROGRESS NOTES
02/27/25 1100   Appointment Info   Signing Clinician's Name / Credentials (SLP) Adan Sung MS CCC-SLP   General Information   Onset of Illness/Injury or Date of Surgery 02/26/25   Referring Physician Dr. You   Patient/Family Therapy Goal Statement (SLP) None stated   Pertinent History of Current Problem Pt was brought to ED yesterday with concerns of fever, shortness of breath, and coughing green sputum. Pt was admitted to hospital with community aquired PNA, COPD.   General Observations Pt was sleeping in chair when SLP arrived to room. Pt was easy to wake and was agreeable to session. Pt engaged with all activities presented.       Present no   Pain Assessment   Patient Currently in Pain No   Type of Evaluation   Type of Evaluation Swallow Evaluation   Oral Motor   Oral Musculature generally intact   Structural Abnormalities none present   Mucosal Quality good   Dentition (Oral Motor)   Dentition (Oral Motor) natural dentition;adequate dentition   Facial Symmetry (Oral Motor)   Facial Symmetry (Oral Motor) WNL   Lip Function (Oral Motor)   Lip Range of Motion (Oral Motor) WNL   Lip Sensitivity (Oral Motor) not tested   Lip Strength (Oral Motor) WNL   Lip Coordination (Oral Motor) other (see comments)  (WNL)   Tongue Function (Oral Motor)   Tongue Sensitivity (Oral Motor) not tested   Tongue Strength (Oral Motor) WNL   Tongue Coordination/Speed (Oral Motor) WNL   Tongue ROM (Oral Motor) WNL   Jaw Function (Oral Motor)   Jaw Function (Oral Motor) WNL   Cough/Swallow/Gag Reflex (Oral Motor)   Soft Palate/Velum (Oral Motor) WNL   Gag Reflex (Oral Motor) not tested   Volitional Throat Clear/Cough (Oral Motor) WNL   Volitional Swallow (Oral Motor) WNL   Vocal Quality/Secretion Management (Oral Motor)   Vocal Quality (Oral Motor) WFL   Secretion Management (Oral Motor) WNL   General Swallowing Observations   Past History of Dysphagia Pt reported no previous concerns with swallowing.  Pt did not report coughing/choking across mealtimes. Upon chart review, pt did not present with previous hx of oropharyngeal dysphagia.   Comment, General Swallowing Observations Pt able to tolerate baseline diet.   Respiratory Support nasal cannula   Current Diet/Method of Nutritional Intake (General Swallowing Observations, NIS) thin liquids (level 0);regular diet   Swallowing Evaluation Clinical swallow evaluation   Clinical Swallow Evaluation   Feeding Assistance no assistance needed   Additional evaluation(s) completed today No   Rationale for completing additional evaluation Clinical swallow assessment cannot determine silent aspiration. If hospitalist has concerns of silent aspiration, a VFSS is recommended to further investigate.   Clinical Swallow Evaluation Textures Trialed thin liquids;pureed;solid foods   Clinical Swallow Eval: Thin Liquid Texture Trial   Mode of Presentation, Thin Liquids straw;self-fed   Volume of Liquid or Food Presented 4 oz   Oral Phase of Swallow WFL   Pharyngeal Phase of Swallow intact   Diagnostic Statement Pt demosntrated ability to tolerate thin liquids via straw in 10/10 trials without clinically overt s/sx of pen/asp. Pt did not present with anterior spillage across trials. It was determined that thin liquids are safe for PO intake.   Clinical Swallow Evaluation: Puree Solid Texture Trial   Mode of Presentation, Puree spoon;self-fed   Volume of Puree Presented 4 oz   Oral Phase, Puree WFL   Pharyngeal Phase, Puree intact   Diagnostic Statement Pt demonstrated ability to tolerate pureed consistency in 12/12 trials without clinically overt s/sx of pen/asp. Pt did not present with anterior spillage/oral pocketing across trials. It was detemrined that pureed consistency is safe for PO intake.   Clinical Swallow Evaluation: Solid Food Texture Trial   Mode of Presentation self-fed   Volume Presented 6 oz   Oral Phase WFL   Pharyngeal Phase intact   Diagnostic Statement Pt  demonstrated ability to tolerate regular consistency in 12/12 trials without clincially overt s/sx of pen/asp. Pt did not present with anterior spillage/oral pocketing across trials. It was determined that regular consistency is safe for PO intake.   Esophageal Phase of Swallow   Patient reports or presents with symptoms of esophageal dysphagia No   Esophageal sweep performed during today s vidofluoroscopic exam  No   Swallowing Recommendations   Diet Consistency Recommendations thin liquids (level 0);regular diet   Supervision Level for Intake patient independent   Swallowing Maneuver Recommendations alternate food and liquid intake   Monitoring/Assistance Required (Eating/Swallowing) stop eating activities when fatigue is present;monitor for cough or change in vocal quality with intake   Recommended Feeding/Eating Techniques (Swallow Eval) patient is independent, no specific recommendations   Medication Administration Recommendations, Swallowing (SLP) Continue with current medication administration recommendations via hospitalist.   Instrumental Assessment Recommendations instrumental evaluation not recommended at this time   Comment, Swallowing Recommendations Pt was seen this AM for a clinical swallow study. Pt was sleeping in chair when SLP arrived to room. Pt was easy to wake and was agreeable to session. Pt engaged with all activities presented. Pt was brought to ED yesterday with concerns of fever, shortness of breath, and coughing green sputum. Pt was admitted to hospital with community aquired PNA, COPD. Pt reported no previous concerns with swallowing. Pt did not report coughing/choking across mealtimes. Upon chart review, pt did not present with previous hx of oropharyngeal dysphagia. Pt demosntrated ability to tolerate thin liquids, pureed consistency, and regular consistency without clincially overt s/sx of pen/asp across all presentations. Pt did not present with anterior spillage/oral pocketing  across consistencies presented. SLP recommends IDDSI 0 (thin liquids) / 7 (regular solids) with recommendation of alternating bites/sips and monitoring for couhging/choking across mealtimes. Due to toelration of baseline diet, SLP services are not indicated at this time.   General Therapy Interventions   Intervention Comments Evaluation Only   Clinical Impression   Criteria for Skilled Therapeutic Interventions Met (SLP Eval) Evaluation only   SLP Diagnosis None   Clinical Impression Comments Pt was seen this AM for a clinical swallow study. Pt was sleeping in chair when SLP arrived to room. Pt was easy to wake and was agreeable to session. Pt engaged with all activities presented. Pt was brought to ED yesterday with concerns of fever, shortness of breath, and coughing green sputum. Pt was admitted to hospital with community aquired PNA, COPD. Pt reported no previous concerns with swallowing. Pt did not report coughing/choking across mealtimes. Upon chart review, pt did not present with previous hx of oropharyngeal dysphagia. Pt demosntrated ability to tolerate thin liquids, pureed consistency, and regular consistency without clincially overt s/sx of pen/asp across all presentations. Pt did not present with anterior spillage/oral pocketing across consistencies presented. SLP recommends IDDSI 0 (thin liquids) / 7 (regular solids) with recommendation of alternating bites/sips and monitoring for couhging/choking across mealtimes. Due to toelration of baseline diet, SLP services are not indicated at this time.   SLP Total Evaluation Time   Eval: oral/pharyngeal swallow function, clinical swallow Minutes (33788) 20   SLP Discharge Planning   SLP Plan Evaluation Only   SLP Discharge Recommendation home   SLP Rationale for DC Rec Pt is tilerating baseline diet at this time without concerns of dysphagia. Pt able to return home on baseline diet.   SLP Brief overview of current status  SLP recommends IDDSI 0 (thin liquids) /  7 (regular solids) with recommendation of alternating bites/sips and monitoring for couhging/choking across mealtimes. Due to toelration of baseline diet, SLP services are not indicated at this time.   SLP Time and Intention   Total Session Time (sum of timed and untimed services) 20

## 2025-02-27 NOTE — MEDICATION SCRIBE - ADMISSION MEDICATION HISTORY
Medication Scribe Admission Medication History    Admission medication history is complete. The information provided in this note is only as accurate as the sources available at the time of the update.    Information Source(s): Patient and CareEverywhere/SureScripts via phone    Pertinent Information:   Patient manages his own medications. Took all morning medications today PTA.  Metformin- usually on IR tablets. Reports he was out of town in January and had a new rx sent in and has been taking the XR tablets.   Pt has not needed his psoriasis creams or ointments recently but has them if needed.     Changes made to PTA medication list:  Added: asa 325 mg tabs PRN  Deleted: None  Changed: updated time of day pt takes medications, metformin from IR to XR    Allergies reviewed with patient and updates made in EHR: yes    Medication History Completed By: Maria Antonia Villegas 2/26/2025 10:11 PM    Current Facility-Administered Medications for the 2/26/25 encounter (Hospital Encounter)   Medication    lidocaine 1% with EPINEPHrine 1:100,000 injection 3 mL     PTA Med List   Medication Sig Last Dose/Taking    amLODIPine (NORVASC) 10 MG tablet Take 10 mg by mouth daily 2/26/2025 at  9:00 AM    aspirin (ASA) 325 MG tablet Take 975 mg by mouth daily as needed for fever or pain. 2/26/2025 Morning    aspirin (ASA) 81 MG chewable tablet Take 81 mg by mouth daily. 2/26/2025 at  9:00 AM    atorvastatin (LIPITOR) 20 MG tablet Take 20 mg by mouth daily (with dinner). 2/25/2025 Evening    ipratropium - albuterol 0.5 mg/2.5 mg/3 mL (DUONEB) 0.5-2.5 (3) MG/3ML neb solution Take 1 vial by nebulization every 6 hours as needed for shortness of breath, wheezing or cough. 2/26/2025 Morning    metFORMIN (GLUCOPHAGE XR) 500 MG 24 hr tablet Take 500 mg by mouth 2 times daily (with meals). 2/26/2025 at  9:00 AM    metoprolol succinate ER (TOPROL XL) 50 MG 24 hr tablet Take 50 mg by mouth daily 2/26/2025 at  9:00 AM    nitroGLYcerin (NITROSTAT) 0.4 MG  sublingual tablet Place 0.4 mg under the tongue every 5 minutes as needed for chest pain.  Do not crush; maximum of 3 doses in 15 minutes. More than a month

## 2025-02-27 NOTE — PHARMACY
Pharmacy Antimicrobial Stewardship Assessment     Current Antimicrobial Therapy:  Anti-infectives (From now, onward)      Start     Dose/Rate Route Frequency Ordered Stop    25 1600  cefTRIAXone IN D5W (ROCEPHIN) intermittent infusion 2 g         2 g  100 mL/hr over 30 Minutes Intravenous EVERY 24 HOURS 25 2356      25 0600  doxycycline (VIBRAMYCIN) 100 mg vial to attach to  mL bag         100 mg  over 1-2 Hours Intravenous EVERY 12 HOURS 25              Indication: CAP    Days of Therapy: 2     Pertinent Labs:    Recent labs: (last 7 days)     25  1639 25  1856 25  0536   WBC 19.4*  --  15.6*   LACT 2.4* 1.1  --    PCAL 0.43  --   --        Temperature:  Temp (24hrs), Av.1  F (37.3  C), Min:97.5  F (36.4  C), Max:100.6  F (38.1  C)      Culture Results:   7-Day Micro Results       Collected Updated Procedure Result Status      2025 1714 2025 1757 Influenza A/B, RSV and SARS-CoV2 PCR (COVID-19) Nose [[id number]]    Swab from Nose    Final result Component Value   Influenza A PCR Negative   Influenza B PCR Negative   RSV PCR Negative   SARS CoV2 PCR Negative   NEGATIVE: SARS-CoV-2 (COVID-19) RNA not detected, presumed negative.            2025 1700 2025 1702 Blood Culture Wrist, Right [[id number]]   Blood from Wrist, Right    In process Component Value   No component results               2025 1639 2025 1647 Blood Culture Peripheral Blood [[id number]]   Peripheral Blood    In process Component Value   No component results                   Recommendations/Interventions:  1. None at this time.    Mora Galaviz, [facility]  2025

## 2025-02-27 NOTE — PROGRESS NOTES
[facility]    Hospitalist Progress Note    Date of Service (when I saw the patient): 02/27/2025    Assessment & Plan   Jamie Chu is a 74 year old male who was admitted on 2/26/2025.    Acute hypoxic respiratory failure: Secondary to community-acquired pneumonia, diagnosed by CT with bibasilar infiltrates.  Patient also has history of COPD, not oxygen dependent at home.  No significant wheezing at bedside.  Denies any history of dysphagia, aspiration.  WBC is trending down currently at 15.6.  Patient is an ex-smoker.  -Continuing empiric ceftriaxone, doxycycline  -Scheduled DuoNebs  -Acapella valve  -Antitussives as needed  -Supplemental oxygen as needed, currently at about 3 and half liters.  Weaning as able    CVS: Patient has history of coronary disease, chronic diastolic CHF, hypertension.  EKG negative, troponins flat.  -Continue home medications.    Non-insulin-dependent diabetes mellitus type 2: Blood sugars are in the 200s here  -Okay to continue metformin  -Continue insulin sliding scale    FEN: Oral diet as tolerated    Clinically Significant Risk Factors Present on Admission          # Hypochloremia: Lowest Cl = 95 mmol/L in last 2 days, will monitor as appropriate    # Hypomagnesemia: Lowest Mg = 1.6 mg/dL in last 2 days, will replace as needed   # Hypoalbuminemia: Lowest albumin = 3 g/dL at 2/27/2025  5:36 AM, will monitor as appropriate   # Drug Induced Platelet Defect: home medication list includes an antiplatelet medication   # Hypertension: Noted on problem list                    DVT Prophylaxis: Pneumatic Compression Devices    Code Status: Special Code    Disposition: Expected discharge in 2-3 days once clinically improved, oxygen weaned.      Hal You MD, MD    Interval History   Patient seen in her room.  Patient endorses modest improvement.  Most bothersome thing to him is cough.  Otherwise no worsening of shortness of breath.  No chest pain, no abdominal pain, no  nausea.    -Data reviewed today: I reviewed all new labs and imaging results over the last 24 hours. I personally reviewed imaging reports.    Physical Exam   Temp: 97.5  F (36.4  C) Temp src: Tympanic BP: 135/63 Pulse: 94   Resp: 20 SpO2: 92 % O2 Device: Nasal cannula Oxygen Delivery: 3 LPM  Vitals:    02/26/25 1712 02/26/25 2130   Weight: 74.8 kg (164 lb 14.5 oz) 70.4 kg (155 lb 3.3 oz)     Vital Signs with Ranges  Temp:  [97.5  F (36.4  C)-100.6  F (38.1  C)] 97.5  F (36.4  C)  Pulse:  [] 94  Resp:  [13-30] 20  BP: (110-160)/(58-82) 135/63  SpO2:  [78 %-98 %] 92 %    Intake/Output Summary (Last 24 hours) at 2/27/2025 1101  Last data filed at 2/27/2025 1033  Gross per 24 hour   Intake 480 ml   Output 500 ml   Net -20 ml       Peripheral IV 02/26/25 Left Upper forearm (Active)   Site Assessment WDL 02/27/25 0917   Line Status Saline locked 02/27/25 0917   Dressing Transparent 02/27/25 0917   Dressing Status clean;dry;intact 02/27/25 0917   Dressing Intervention New dressing  02/26/25 1638   Line Intervention Flushed 02/27/25 0917   Phlebitis Scale 0-->no symptoms 02/27/25 0917   Infiltration? no 02/27/25 0315   Number of days: 1     No line/device    Constitutional - AA, NAD  HEENT - atraumatic, normocephalic  Neck - supple, no masses, no JVD  CVS - S1 S2 RRR, no murmurs, rubs, gallops  Respiratory - diminished breath sounds bilaterally, no coarseness, no wheezes  GI - soft, NT, ND, + bowel sounds, no organomegaly  Musculoskeletal - no LE edema, no lesions  Neuro - oriented x 3, no gross focal deficits    Medications   Current Facility-Administered Medications   Medication Dose Route Frequency Provider Last Rate Last Admin     Current Facility-Administered Medications   Medication Dose Route Frequency Provider Last Rate Last Admin    amLODIPine (NORVASC) tablet 10 mg  10 mg Oral Mora Griffin MD   10 mg at 02/27/25 0926    aspirin (ASA) chewable tablet 81 mg  81 mg Oral Mora Griffin MD   81  mg at 02/27/25 0926    atorvastatin (LIPITOR) tablet 20 mg  20 mg Oral Daily with supper Mora Smith MD   20 mg at 02/27/25 0020    cefTRIAXone IN D5W (ROCEPHIN) intermittent infusion 2 g  2 g Intravenous Q24H Mora Smith MD        doxycycline (VIBRAMYCIN) 100 mg vial to attach to  mL bag  100 mg Intravenous Q12H Mora Smith MD   100 mg at 02/27/25 0531    insulin aspart (NovoLOG) injection (RAPID ACTING)  1-7 Units Subcutaneous TID AC Mora Smith MD   1 Units at 02/27/25 0954    insulin aspart (NovoLOG) injection (RAPID ACTING)  1-5 Units Subcutaneous At Bedtime Mora Smith MD        ipratropium - albuterol 0.5 mg/2.5 mg/3 mL (DUONEB) neb solution 3 mL  3 mL Nebulization 4x daily Mora Smith MD   3 mL at 02/27/25 0934    metoprolol succinate ER (TOPROL XL) 24 hr tablet 50 mg  50 mg Oral QAM Mora Smith MD   50 mg at 02/27/25 0926    sodium chloride (PF) 0.9% PF flush 3 mL  3 mL Intracatheter Q8H Jaskaran Alaniz MD   3 mL at 02/27/25 0020    sodium chloride (PF) 0.9% PF flush 3 mL  3 mL Intracatheter Q8H Mora Smith MD   3 mL at 02/27/25 0927       Data   Recent Labs   Lab 02/27/25  0931 02/27/25  0536 02/27/25  0535 02/26/25  2202 02/26/25  1639   WBC  --  15.6*  --   --  19.4*   HGB  --  12.0*  --   --  14.0   MCV  --  88  --   --  87   PLT  --  250  --   --  263   NA  --  136  --   --  135   POTASSIUM  --  4.1  --   --  3.7   CHLORIDE  --  99  --   --  95*   CO2  --  29  --   --  25   BUN  --  16.7  --   --  18.5   CR  --  1.12  --   --  1.16   ANIONGAP  --  8  --   --  15   RUMA  --  8.5*  --   --  8.9   * 253* 236*   < > 287*   ALBUMIN  --  3.0*  --   --  3.6   PROTTOTAL  --  6.0*  --   --  7.0   BILITOTAL  --  0.9  --   --  1.6*   ALKPHOS  --  124  --   --  145   ALT  --  40  --   --  49   AST  --  36  --   --  52*    < > = values in this interval not displayed.     Lactic Acid   Date Value Ref Range Status   02/26/2025 1.1 0.7 - 2.0 mmol/L Final    01/16/2023 1.8 0.7 - 2.0 mmol/L Final   10/17/2022 1.6 0.7 - 2.0 mmol/L Final     Lactic Acid, Initial   Date Value Ref Range Status   02/26/2025 2.4 (H) 0.7 - 2.0 mmol/L Final       Recent Results (from the past 24 hours)   CT Chest Pulmonary Embolism w Contrast    Narrative    EXAM: CT CHEST PULMONARY EMBOLISM W CONTRAST  LOCATION: [facility]  DATE: 2/26/2025    INDICATION: 4 days of productive cough, copd, fever hypoxia ro pneumonia and PE  COMPARISON: 6/18/2024  TECHNIQUE: CT chest pulmonary angiogram during arterial phase injection of IV contrast. Multiplanar reformats and MIP reconstructions were performed. Dose reduction techniques were used.   CONTRAST: [id number]   58ML    FINDINGS:  ANGIOGRAM CHEST: Pulmonary arteries are normal caliber and negative for pulmonary emboli. Thoracic aorta is negative for dissection. No CT evidence of right heart strain.    LUNGS AND PLEURA: Patchy consolidation and nodular opacities within both lower lobes with corresponding bronchial wall thickening and endobronchial opacities. Tree-in-bud opacities also present in the lingula. No pleural effusion or pneumothorax.   Moderate upper lung predominant centrilobular emphysema.    MEDIASTINUM/AXILLAE: Normal.    CORONARY ARTERY CALCIFICATION: Moderate.    UPPER ABDOMEN: Normal.    MUSCULOSKELETAL: Surgical hardware in the right clavicle. Degenerative changes in the spine.      Impression    IMPRESSION:  1.  No pulmonary embolism.  2.  Bilateral lower lobe pneumonia with associated endobronchial opacities and bronchial wall thickening, which suggests aspiration or retained secretions.         Hal You MD

## 2025-02-27 NOTE — PLAN OF CARE
Plan of Care Reviewed With: Patient    Overall Patient Progress: Improving     Patient A&O, makes needs known. Afebrile.VSS. PIV SL. Denies pain. Blood sugar monitored. LS dim in the bases, on 3L NC, RA at baseline. HRR SR 80'-90's. Abd soft, nontender, with audible BS, no Bms noted. Voiding without difficulty, using urinal. Up SBA. Call light within reach.    Face to face report given with opportunity to observe patient.    Report given to Serg VARGAS.    Marcello Brewster RN   2/27/2025  7:08 AM

## 2025-02-27 NOTE — ED PROVIDER NOTES
Patient signed out to me at change of shift.  See Dr. Alaniz's complete ER note.    Signed out to me that patient needs admission and I am to discuss with overnight hospitalist.     Briefly 74-year-old male with COPD exacerbation.  He arrived with room air sat 78%.  Not on home oxygen.  CT scan shows bilateral pneumonia.  Meds given in the ED.     Medications   sodium chloride (PF) 0.9% PF flush 3 mL (has no administration in time range)   sodium chloride (PF) 0.9% PF flush 3 mL (has no administration in time range)   acetaminophen (TYLENOL) tablet 650 mg (650 mg Oral $Given 2/26/25 1725)     Or   acetaminophen (TYLENOL) Suppository 650 mg ( Rectal See Alternative 2/26/25 1725)   ipratropium - albuterol 0.5 mg/2.5 mg/3 mL (DUONEB) 0.5-2.5 (3) MG/3ML neb solution (has no administration in time range)   cefTRIAXone IN D5W (ROCEPHIN) intermittent infusion 2 g (0 g Intravenous Stopped 2/26/25 1731)   doxycycline (VIBRAMYCIN) 100 mg vial to attach to  mL bag (0 mg Intravenous Stopped 2/26/25 1915)   ipratropium - albuterol 0.5 mg/2.5 mg/3 mL (DUONEB) neb solution 3 mL (3 mLs Nebulization $Given 2/26/25 1645)   sodium chloride (PF) 0.9% PF flush 100 mL (50 mLs Intravenous $Given 2/26/25 1733)   iopamidol (ISOVUE-370) solution 58 mL (58 mLs Intravenous $Given 2/26/25 1733)     Repeat lactate is 1.1 and repeat troponin is unchanged at 43.  He is down to 4 L of O2 and stable for admission.     2000 I spoke with Hospitalist, Dr Smith, who graciously accepted care of patient for admission.       Dx COPD  Hypoxia  Pneumonia            Cynthia Harkins MD  02/26/25 2004

## 2025-02-27 NOTE — H&P
"HOSPITALIST TELEMED ADMISSION H&P    Service Date : 2/26/2025  IDr. Smith, am located in [place].   Jamie Chu is located in [place] at [place].   The RN on the floor is assisting me today with this patient.    chief complaint: Shortness of breath    History of Present Illness:  Jamie Chu is a 74 year old male with a h/o COPD not on home O2, CHF, HTN, CAD, DM2, CAD, HL, referred for admission for PNA and hypoxia. He started feeling SOB about 6 days ago. States is gradually got worse and became so severe it \"boggled his mind.\" Assoc with poor appetite, states he hasn't eaten for 4 days. Assoc with cough, occ productive. Felt like he had a fever but didn't take his temp. No n/v/dabd pain. No CP. Some wheezing, not much. Has been using his nebulizer which helped some. Does not have home O2.  He denies any issues with swallowing, coughing or choking when he eats    Emergency Room Course -  IN the ER, pt was low grade febrile to 100.6, and satting 78% on RA, 95% on 4L. BMP was normal. Mg low at 1.6.  initial lactate was 2.4 came down to 1.1 after treatment in the ER.  Procalcitonin was 0.43.  Troponin was 43, unchanged on repeat at 43.  Venous blood gas showed normal pH of 7.42.  CBC showed elevated white count of 19.  Flu and COVID were negative.  Blood cultures were sent.  CT chest showed no PE but did show bilateral lower lobe pneumonia with associated endobronchial opacities and bronchial wall thickening suggesting aspiration or retained secretions.  In the ER, patient was given Tylenol, ceftriaxone, doxycycline, DuoNeb, and was referred for admission.    Past Medical History  Past Medical History:   Diagnosis Date    Acute respiratory failure with hypoxia (H) 1/16/2023    Pneumonia of left lower lobe due to infectious organism 1/16/2023      Patient Active Problem List   Diagnosis    Chronic diastolic congestive heart failure (H)    Chronic obstructive pulmonary disease, unspecified (H)    Essential " (primary) hypertension    Myocardial infarction (H)    Former smoker    Type 2 diabetes mellitus without complication, without long-term current use of insulin (H)    Coronary artery disease involving native coronary artery of native heart without angina pectoris    Dyslipidemia    Abdominal bruit    Psoriasis    Tinnitus, bilateral    Left carotid bruit    PVC's (premature ventricular contractions)    Renal artery stenosis    Stenosis of right carotid artery    Hypoxia    COPD exacerbation (H)    Pneumonia of both lower lobes due to infectious organism         Past Surgical History  History reviewed. No pertinent surgical history.   Social History  Jamie  reports that he quit smoking about 2 years ago. His smoking use included cigarettes. He started smoking about 60 years ago. He has a 28.6 pack-year smoking history. He has been exposed to tobacco smoke. He has never used smokeless tobacco. He reports that he does not currently use alcohol. He reports that he does not use drugs.    Family History  Jamie's family history is not on file.    Home Medications  Current Outpatient Medications   Medication Instructions    amLODIPine (NORVASC) 10 mg, Oral, EVERY MORNING    aspirin (ASA) 81 mg, Oral, EVERY MORNING    aspirin (ASA) 975 mg, Oral, DAILY PRN    atorvastatin (LIPITOR) 20 mg, DAILY WITH SUPPER    betamethasone dipropionate (DIPROSONE) 0.05 % external ointment Topical, 2 TIMES DAILY, Use on hands. Do not use for more than 14 days in a row.    ipratropium - albuterol 0.5 mg/2.5 mg/3 mL (DUONEB) 0.5-2.5 (3) MG/3ML neb solution 1 vial, EVERY 6 HOURS PRN    metFORMIN (GLUCOPHAGE XR) 500 mg, 2 TIMES DAILY WITH MEALS    metoprolol succinate ER (TOPROL XL) 50 mg, Oral, EVERY MORNING    mometasone (ELOCON) 0.1 % external cream Apply once a day for psoriasis    mometasone (ELOCON) 0.1 % external ointment Topical, DAILY    nitroGLYcerin (NITROSTAT) 0.4 mg, EVERY 5 MIN PRN    triamcinolone (KENALOG) 0.1 % external cream  Apply once or twice a day for psoriasis       Allergies  No Known Allergies     10 pt ROS neg except as noted in HPI    Physical Exam:  I performed all aspects of the physical examination via Telemedicine associated with two way audio and video communication.    Vital Signs: Blood pressure 110/62, pulse 103, temperature 98.9  F (37.2  C), temperature source Tympanic, resp. rate 16, weight 74.8 kg (164 lb 14.5 oz), SpO2 (!) 89%.    Physical Exam    Gen:  Well-developed, well-nourished, in no acute distress, lying semi-supine in hospital stretcher  HEENT:  Anicteric sclera, PER, hearing intact to voice  Resp:  No accessory muscle use,  Coarse rhonchi bilaterally no wheezing, normal effort  Card:  No murmur, normal S1, S2   Abd:  Soft per RN exam, no TTP, non-distended, normoactive bowel sounds are present  Musc:  no edema  Psych:  Good insight, oriented to person, place and time, not anxious, not agitated    DATA  Labs:  I have personally reviewed the following studies:  Recent Labs   Lab 02/26/25  1639   POTASSIUM 3.7   CHLORIDE 95*   CO2 25   BUN 18.5   ALBUMIN 3.6   ALKPHOS 145   AST 52*   ALT 49      Recent Labs   Lab 02/26/25  1639   WBC 19.4*   HGB 14.0   HCT 39.8*          Imaging: ER imaging reviewed    ASSESSMENT/PLAN:      1. Community-acquired pneumonia, acute hypoxic respiratory failure , COPD:  - admit to inpatient status  - continue supplemental oxygen as needed, may require home O2 on  discharge  - continue ceftriaxone and azithromycin for antibiotics  - repeat CBC in a.m.  - speech therapy evaluation for dysphagia given CT read although he denies any symptoms of dysphagia.  -  Scheduled and p.r.n. DuoNebs  - no wheezing so I do not think he requires any steroids time    2.  History of chronic diastolic CHF, CAD:  - no evidence of decompensated CHF at this time  - troponin minimally elevated but flat and unchanged, no evidence of ACS.  -  Continue home cardiac regimen of aspirin,  metoprolol,  Lipitor     3. Type 2 diabetes:  -  Hold metformin  - order sliding scale     4. Hypertension:  -  Continue home regimen of aspirin, metoprolol      Clinically Significant Risk Factors Present on Admission          # Hypochloremia: Lowest Cl = 95 mmol/L in last 2 days, will monitor as appropriate    # Hypomagnesemia: Lowest Mg = 1.6 mg/dL in last 2 days, will replace as needed     # Drug Induced Platelet Defect: home medication list includes an antiplatelet medication   # Hypertension: Noted on problem list                        Misc  DVT prophylaxis: SCDs  Code Status:  compressions and CPR okay but do not intubate    The plan was discussed with - Patient  Time: 30 min

## 2025-02-27 NOTE — PROGRESS NOTES
Writer attempted to complete CM assessment with patient, but patient was asleep upon writer entering room. Writer will re-approach patient at a later time to complete assessment.

## 2025-02-28 LAB
GLUCOSE BLDC GLUCOMTR-MCNC: 156 MG/DL (ref 70–99)
GLUCOSE BLDC GLUCOMTR-MCNC: 184 MG/DL (ref 70–99)
GLUCOSE BLDC GLUCOMTR-MCNC: 228 MG/DL (ref 70–99)
GLUCOSE BLDC GLUCOMTR-MCNC: 295 MG/DL (ref 70–99)

## 2025-02-28 PROCEDURE — 94799 UNLISTED PULMONARY SVC/PX: CPT

## 2025-02-28 PROCEDURE — 250N000013 HC RX MED GY IP 250 OP 250 PS 637: Performed by: INTERNAL MEDICINE

## 2025-02-28 PROCEDURE — 120N000001 HC R&B MED SURG/OB

## 2025-02-28 PROCEDURE — 250N000009 HC RX 250: Performed by: INTERNAL MEDICINE

## 2025-02-28 PROCEDURE — 999N000157 HC STATISTIC RCP TIME EA 10 MIN

## 2025-02-28 PROCEDURE — 94640 AIRWAY INHALATION TREATMENT: CPT

## 2025-02-28 PROCEDURE — 94640 AIRWAY INHALATION TREATMENT: CPT | Mod: 76

## 2025-02-28 PROCEDURE — 250N000011 HC RX IP 250 OP 636: Performed by: INTERNAL MEDICINE

## 2025-02-28 RX ADMIN — ACETAMINOPHEN 650 MG: 325 TABLET, FILM COATED ORAL at 19:57

## 2025-02-28 RX ADMIN — INSULIN ASPART 4 UNITS: 100 INJECTION, SOLUTION INTRAVENOUS; SUBCUTANEOUS at 12:44

## 2025-02-28 RX ADMIN — INSULIN ASPART 1 UNITS: 100 INJECTION, SOLUTION INTRAVENOUS; SUBCUTANEOUS at 10:05

## 2025-02-28 RX ADMIN — IPRATROPIUM BROMIDE AND ALBUTEROL SULFATE 3 ML: .5; 3 SOLUTION RESPIRATORY (INHALATION) at 16:05

## 2025-02-28 RX ADMIN — AMLODIPINE BESYLATE 10 MG: 5 TABLET ORAL at 09:18

## 2025-02-28 RX ADMIN — IPRATROPIUM BROMIDE AND ALBUTEROL SULFATE 3 ML: .5; 3 SOLUTION RESPIRATORY (INHALATION) at 07:31

## 2025-02-28 RX ADMIN — ASPIRIN 81 MG CHEWABLE TABLET 81 MG: 81 TABLET CHEWABLE at 09:19

## 2025-02-28 RX ADMIN — DOXYCYCLINE 100 MG: 100 INJECTION, POWDER, LYOPHILIZED, FOR SOLUTION INTRAVENOUS at 18:15

## 2025-02-28 RX ADMIN — CEFTRIAXONE SODIUM 2 G: 2 INJECTION, SOLUTION INTRAVENOUS at 16:52

## 2025-02-28 RX ADMIN — IPRATROPIUM BROMIDE AND ALBUTEROL SULFATE 3 ML: .5; 3 SOLUTION RESPIRATORY (INHALATION) at 11:08

## 2025-02-28 RX ADMIN — GUAIFENESIN AND CODEINE PHOSPHATE 5 ML: 100; 10 SOLUTION ORAL at 19:57

## 2025-02-28 RX ADMIN — DOXYCYCLINE 100 MG: 100 INJECTION, POWDER, LYOPHILIZED, FOR SOLUTION INTRAVENOUS at 05:03

## 2025-02-28 RX ADMIN — INSULIN ASPART 1 UNITS: 100 INJECTION, SOLUTION INTRAVENOUS; SUBCUTANEOUS at 17:24

## 2025-02-28 RX ADMIN — ATORVASTATIN CALCIUM 20 MG: 20 TABLET, FILM COATED ORAL at 16:54

## 2025-02-28 RX ADMIN — METOPROLOL SUCCINATE 50 MG: 50 TABLET, EXTENDED RELEASE ORAL at 09:19

## 2025-02-28 RX ADMIN — GUAIFENESIN AND CODEINE PHOSPHATE 5 ML: 100; 10 SOLUTION ORAL at 09:23

## 2025-02-28 RX ADMIN — IPRATROPIUM BROMIDE AND ALBUTEROL SULFATE 3 ML: .5; 3 SOLUTION RESPIRATORY (INHALATION) at 19:21

## 2025-02-28 ASSESSMENT — ACTIVITIES OF DAILY LIVING (ADL)
ADLS_ACUITY_SCORE: 27

## 2025-02-28 NOTE — PROGRESS NOTES
[facility]    Hospitalist Progress Note    Date of Service (when I saw the patient): 02/28/2025    Assessment & Plan   Jamie Chu is a 74 year old male who was admitted on 2/26/2025.    Acute hypoxic respiratory failure: Secondary to community-acquired pneumonia, diagnosed by CT with bibasilar infiltrates.  Patient also has history of COPD, not oxygen dependent at home.  No significant wheezing at bedside.  Denies any history of dysphagia, aspiration.  WBC is trending down currently at 15.6.  Patient is an ex-smoker.  -2/28: Patient feels more short of breath today, requiring up to 10 L high flow nasal cannula.  Lung sound clear.  Continuing scheduled DuoNebs, empiric ceftriaxone and doxycycline.  Continue aggressive pulmonary toilet with Acapella valve, incentive spirometer, ambulation as tolerated.    CVS: Patient has history of coronary disease, chronic diastolic CHF, hypertension.  EKG negative, troponins flat.  -Continue home medications.    Non-insulin-dependent diabetes mellitus type 2: Blood sugars are in the 200s here  -Okay to continue metformin  -Continue insulin sliding scale    FEN: Oral diet as tolerated    Clinically Significant Risk Factors          # Hypochloremia: Lowest Cl = 95 mmol/L in last 2 days, will monitor as appropriate    # Hypomagnesemia: Lowest Mg = 1.6 mg/dL in last 2 days, will replace as needed   # Hypoalbuminemia: Lowest albumin = 3 g/dL at 2/27/2025  5:36 AM, will monitor as appropriate     # Hypertension: Noted on problem list                     DVT Prophylaxis: Pneumatic Compression Devices    Code Status: Special Code    Disposition: Expected discharge in 1-3 days once clinically improved, oxygen weaned.      Hal You MD, MD    Interval History   Patient seen in room.  Patient feels more short of breath, but states coughing is better.  Overall, patient does state he is improved.  He is requiring 10 L high flow nasal cannula currently.  Will give him a  trial of Airvo if things worsen.    -Data reviewed today: I reviewed all new labs and imaging results over the last 24 hours. I personally reviewed imaging reports.    Physical Exam   Temp: 98.8  F (37.1  C) Temp src: Tympanic BP: 132/64 Pulse: 89   Resp: 20 SpO2: 93 % O2 Device: High Flow Nasal Cannula (HFNC) Oxygen Delivery: 10 LPM  Vitals:    02/26/25 1712 02/26/25 2130   Weight: 74.8 kg (164 lb 14.5 oz) 70.4 kg (155 lb 3.3 oz)     Vital Signs with Ranges  Temp:  [97.6  F (36.4  C)-100.6  F (38.1  C)] 98.8  F (37.1  C)  Pulse:  [89] 89  Resp:  [18-20] 20  BP: (121-141)/(60-66) 132/64  SpO2:  [84 %-97 %] 93 %      Intake/Output Summary (Last 24 hours) at 2/28/2025 0951  Last data filed at 2/28/2025 0803  Gross per 24 hour   Intake 1366 ml   Output 1250 ml   Net 116 ml         Peripheral IV 02/28/25 Left;Posterior Lower forearm (Active)   Site Assessment WDL 02/28/25 0507   Line Status Saline locked;blood return noted 02/28/25 0507   Dressing Transparent 02/28/25 0507   Dressing Status clean;dry;intact 02/28/25 0507   Line Intervention Flushed 02/28/25 0507   Phlebitis Scale 0-->no symptoms 02/28/25 0507   Infiltration? no 02/28/25 0507   Number of days: 0     No line/device    Constitutional - AA, NAD  HEENT - atraumatic, normocephalic  Neck - supple, no masses, no JVD  CVS - S1 S2 RRR, no murmurs, rubs, gallops  Respiratory - diminished breath sounds bilaterally but clear, no wheezes  GI - soft, NT, ND, + bowel sounds, no organomegaly  Musculoskeletal - no LE edema, no lesions  Neuro - oriented x 3, no gross focal deficits      Medications   Current Facility-Administered Medications   Medication Dose Route Frequency Provider Last Rate Last Admin     Current Facility-Administered Medications   Medication Dose Route Frequency Provider Last Rate Last Admin    amLODIPine (NORVASC) tablet 10 mg  10 mg Oral Mroa Griffin MD   10 mg at 02/28/25 0918    aspirin (ASA) chewable tablet 81 mg  81 mg Oral QAM Real,  Mora PARIKH MD   81 mg at 02/28/25 0919    atorvastatin (LIPITOR) tablet 20 mg  20 mg Oral Daily with supper Mora Smith MD   20 mg at 02/27/25 1649    cefTRIAXone IN D5W (ROCEPHIN) intermittent infusion 2 g  2 g Intravenous Q24H Mora Smith  mL/hr at 02/27/25 1608 2 g at 02/27/25 1608    doxycycline (VIBRAMYCIN) 100 mg vial to attach to  mL bag  100 mg Intravenous Q12H Mroa Smith MD   100 mg at 02/28/25 0503    insulin aspart (NovoLOG) injection (RAPID ACTING)  1-7 Units Subcutaneous TID AC Mora Smith MD   4 Units at 02/27/25 1644    insulin aspart (NovoLOG) injection (RAPID ACTING)  1-5 Units Subcutaneous At Bedtime Mora Smith MD   1 Units at 02/27/25 2307    ipratropium - albuterol 0.5 mg/2.5 mg/3 mL (DUONEB) neb solution 3 mL  3 mL Nebulization 4x daily Mora Smith MD   3 mL at 02/28/25 0731    metoprolol succinate ER (TOPROL XL) 24 hr tablet 50 mg  50 mg Oral QAM Mora Smith MD   50 mg at 02/28/25 0919    sodium chloride (PF) 0.9% PF flush 3 mL  3 mL Intracatheter Q8H Jaskaran Alaniz MD   3 mL at 02/27/25 0020    sodium chloride (PF) 0.9% PF flush 3 mL  3 mL Intracatheter Q8H Mora Smith MD   3 mL at 02/28/25 0923       Data   Recent Labs   Lab 02/28/25  0921 02/27/25  2305 02/27/25  1634 02/27/25  0931 02/27/25  0536 02/26/25  2202 02/26/25  1639   WBC  --   --   --   --  15.6*  --  19.4*   HGB  --   --   --   --  12.0*  --  14.0   MCV  --   --   --   --  88  --  87   PLT  --   --   --   --  250  --  263   NA  --   --   --   --  136  --  135   POTASSIUM  --   --   --   --  4.1  --  3.7   CHLORIDE  --   --   --   --  99  --  95*   CO2  --   --   --   --  29  --  25   BUN  --   --   --   --  16.7  --  18.5   CR  --   --   --   --  1.12  --  1.16   ANIONGAP  --   --   --   --  8  --  15   RUMA  --   --   --   --  8.5*  --  8.9   * 210* 293*   < > 253*   < > 287*   ALBUMIN  --   --   --   --  3.0*  --  3.6   PROTTOTAL  --   --   --   --  6.0*   --  7.0   BILITOTAL  --   --   --   --  0.9  --  1.6*   ALKPHOS  --   --   --   --  124  --  145   ALT  --   --   --   --  40  --  49   AST  --   --   --   --  36  --  52*    < > = values in this interval not displayed.     Lactic Acid   Date Value Ref Range Status   02/26/2025 1.1 0.7 - 2.0 mmol/L Final   01/16/2023 1.8 0.7 - 2.0 mmol/L Final   10/17/2022 1.6 0.7 - 2.0 mmol/L Final     Lactic Acid, Initial   Date Value Ref Range Status   02/26/2025 2.4 (H) 0.7 - 2.0 mmol/L Final       No results found for this or any previous visit (from the past 24 hours).      Hal You MD

## 2025-02-28 NOTE — PLAN OF CARE
Goal Outcome Evaluation:      Plan of Care Reviewed With: patient    Overall Patient Progress: improving    Patient alert and oriented, following commands, afebrile. SR-ST on telemetry, BP stable. Did have to titrate oxygen up to 5L today, lung sounds diminished with some wheezes at times. Does have a good congested cough. Up to the bathroom to void, no BM. SBA, able to reposition independently. PIV remains intact to L) AC. Rest of assessments as charted.     Face to face report given with opportunity to observe patient.    Report given to Lissa Lindsey RN   2/27/2025  7:05 PM

## 2025-02-28 NOTE — PLAN OF CARE
Pt is alert and oriented. VS and assessment as charted, afebrile, denies pain. On 5L NC. 90-92%.  SBA for transfers, Productive cough. Able to make needs known, Call light remains within reach.      Face to face report given with opportunity to observe patient.    Report given to SAM Armendariz RN   2/28/2025  0103

## 2025-02-28 NOTE — PLAN OF CARE
Plan of Care Reviewed With: Patient     Overall Patient Progress: Not Progressing     Patient A&O, makes needs known. Low grade 99.6 F Tylenol .VSS. PIV SL. Denies pain. Blood sugar monitored. LS dim throughout, requiring more oxygen place on High Flow NC 3L. HRR SR 80'-90's. Abd soft, nontender, with audible BS, no Bms noted. Voiding without difficulty, tea colored urine, using urinal. Up SBA. Call light within reach.     Face to face report given with opportunity to observe patient.    Report given to Janeen VARGAS.    Marcello Brewster RN   2/28/2025  7:34 AM

## 2025-02-28 NOTE — PROGRESS NOTES
Assessment completed by face to face visit with Jamie.    LOC: alert & oriented x 4.     Dx: COPD exacerbation  Chronic Disease Management: COPD, CHF, HTN, CAD, Dyslipidemia, Psoriasis    Lives with: alone  Living at:  Home in [place]  Transportation: YES Drives self    Primary PCP: Jaime Hebert  Insurance:  [employer] Medicare Advantage      Support System:  Son  Homecare/PCA: No  /County Services:   No  [place]: NO     Health Care Directive: No  Guardian: No  POA: No    Pharmacy: [facility]  Meds management: Independent    Adequate Resources for needs (housing, utilities, food/med): YES  Household chores: Independent  Work/community/social activity: YES as desired    ADLs: Independent  Ambulation:Independent  Falls: Denies  Nutrition: Denies concerns  Sleep: Denies concerns    Able to Return to Prior Living Arrangements: YES    Choice of Vendor: N/A    Barriers: None noted at this time.    Plan: Anticipate discharge home via self transportation. Patient drove himself to the hospital, so his vehicle is here.

## 2025-03-01 ENCOUNTER — APPOINTMENT (OUTPATIENT)
Dept: PHYSICAL THERAPY | Facility: HOSPITAL | Age: 75
DRG: 193 | End: 2025-03-01
Attending: INTERNAL MEDICINE
Payer: COMMERCIAL

## 2025-03-01 LAB
ANION GAP SERPL CALCULATED.3IONS-SCNC: 11 MMOL/L (ref 7–15)
BUN SERPL-MCNC: 10.8 MG/DL (ref 8–23)
CALCIUM SERPL-MCNC: 8.3 MG/DL (ref 8.8–10.4)
CHLORIDE SERPL-SCNC: 99 MMOL/L (ref 98–107)
CREAT SERPL-MCNC: 0.99 MG/DL (ref 0.67–1.17)
CRP SERPL-MCNC: 176.96 MG/L
EGFRCR SERPLBLD CKD-EPI 2021: 80 ML/MIN/1.73M2
ERYTHROCYTE [DISTWIDTH] IN BLOOD BY AUTOMATED COUNT: 13.5 % (ref 10–15)
GLUCOSE BLDC GLUCOMTR-MCNC: 134 MG/DL (ref 70–99)
GLUCOSE BLDC GLUCOMTR-MCNC: 198 MG/DL (ref 70–99)
GLUCOSE BLDC GLUCOMTR-MCNC: 250 MG/DL (ref 70–99)
GLUCOSE BLDC GLUCOMTR-MCNC: 294 MG/DL (ref 70–99)
GLUCOSE BLDC GLUCOMTR-MCNC: 296 MG/DL (ref 70–99)
GLUCOSE SERPL-MCNC: 159 MG/DL (ref 70–99)
HCO3 SERPL-SCNC: 26 MMOL/L (ref 22–29)
HCT VFR BLD AUTO: 33.1 % (ref 40–53)
HGB BLD-MCNC: 11.4 G/DL (ref 13.3–17.7)
MCH RBC QN AUTO: 30.8 PG (ref 26.5–33)
MCHC RBC AUTO-ENTMCNC: 34.4 G/DL (ref 31.5–36.5)
MCV RBC AUTO: 90 FL (ref 78–100)
PLATELET # BLD AUTO: 225 10E3/UL (ref 150–450)
POTASSIUM SERPL-SCNC: 3.8 MMOL/L (ref 3.4–5.3)
RBC # BLD AUTO: 3.7 10E6/UL (ref 4.4–5.9)
SODIUM SERPL-SCNC: 136 MMOL/L (ref 135–145)
WBC # BLD AUTO: 13.3 10E3/UL (ref 4–11)

## 2025-03-01 PROCEDURE — 97161 PT EVAL LOW COMPLEX 20 MIN: CPT | Mod: GP | Performed by: PHYSICAL THERAPIST

## 2025-03-01 PROCEDURE — 94799 UNLISTED PULMONARY SVC/PX: CPT

## 2025-03-01 PROCEDURE — 36415 COLL VENOUS BLD VENIPUNCTURE: CPT | Performed by: INTERNAL MEDICINE

## 2025-03-01 PROCEDURE — 250N000012 HC RX MED GY IP 250 OP 636 PS 637: Performed by: INTERNAL MEDICINE

## 2025-03-01 PROCEDURE — 120N000001 HC R&B MED SURG/OB

## 2025-03-01 PROCEDURE — 97530 THERAPEUTIC ACTIVITIES: CPT | Mod: GP | Performed by: PHYSICAL THERAPIST

## 2025-03-01 PROCEDURE — 250N000013 HC RX MED GY IP 250 OP 250 PS 637: Performed by: INTERNAL MEDICINE

## 2025-03-01 PROCEDURE — 86140 C-REACTIVE PROTEIN: CPT | Performed by: INTERNAL MEDICINE

## 2025-03-01 PROCEDURE — 85014 HEMATOCRIT: CPT | Performed by: INTERNAL MEDICINE

## 2025-03-01 PROCEDURE — 94640 AIRWAY INHALATION TREATMENT: CPT

## 2025-03-01 PROCEDURE — 94640 AIRWAY INHALATION TREATMENT: CPT | Mod: 76

## 2025-03-01 PROCEDURE — 80048 BASIC METABOLIC PNL TOTAL CA: CPT | Performed by: INTERNAL MEDICINE

## 2025-03-01 PROCEDURE — 250N000009 HC RX 250: Performed by: INTERNAL MEDICINE

## 2025-03-01 PROCEDURE — 250N000011 HC RX IP 250 OP 636: Performed by: INTERNAL MEDICINE

## 2025-03-01 PROCEDURE — 999N000157 HC STATISTIC RCP TIME EA 10 MIN

## 2025-03-01 RX ORDER — PREDNISONE 20 MG/1
40 TABLET ORAL DAILY
Status: COMPLETED | OUTPATIENT
Start: 2025-03-01 | End: 2025-03-05

## 2025-03-01 RX ADMIN — METOPROLOL SUCCINATE 50 MG: 50 TABLET, EXTENDED RELEASE ORAL at 08:15

## 2025-03-01 RX ADMIN — IPRATROPIUM BROMIDE AND ALBUTEROL SULFATE 3 ML: .5; 3 SOLUTION RESPIRATORY (INHALATION) at 19:55

## 2025-03-01 RX ADMIN — CEFTRIAXONE SODIUM 2 G: 2 INJECTION, SOLUTION INTRAVENOUS at 16:41

## 2025-03-01 RX ADMIN — IPRATROPIUM BROMIDE AND ALBUTEROL SULFATE 3 ML: .5; 3 SOLUTION RESPIRATORY (INHALATION) at 11:13

## 2025-03-01 RX ADMIN — DOXYCYCLINE 100 MG: 100 INJECTION, POWDER, LYOPHILIZED, FOR SOLUTION INTRAVENOUS at 06:15

## 2025-03-01 RX ADMIN — IPRATROPIUM BROMIDE AND ALBUTEROL SULFATE 3 ML: .5; 3 SOLUTION RESPIRATORY (INHALATION) at 03:48

## 2025-03-01 RX ADMIN — AMLODIPINE BESYLATE 10 MG: 5 TABLET ORAL at 08:15

## 2025-03-01 RX ADMIN — ATORVASTATIN CALCIUM 20 MG: 20 TABLET, FILM COATED ORAL at 16:41

## 2025-03-01 RX ADMIN — GUAIFENESIN AND CODEINE PHOSPHATE 5 ML: 100; 10 SOLUTION ORAL at 03:43

## 2025-03-01 RX ADMIN — PREDNISONE 40 MG: 20 TABLET ORAL at 10:37

## 2025-03-01 RX ADMIN — IPRATROPIUM BROMIDE AND ALBUTEROL SULFATE 3 ML: .5; 3 SOLUTION RESPIRATORY (INHALATION) at 07:26

## 2025-03-01 RX ADMIN — DOXYCYCLINE 100 MG: 100 INJECTION, POWDER, LYOPHILIZED, FOR SOLUTION INTRAVENOUS at 17:50

## 2025-03-01 RX ADMIN — INSULIN ASPART 4 UNITS: 100 INJECTION, SOLUTION INTRAVENOUS; SUBCUTANEOUS at 17:13

## 2025-03-01 RX ADMIN — IPRATROPIUM BROMIDE AND ALBUTEROL SULFATE 3 ML: .5; 3 SOLUTION RESPIRATORY (INHALATION) at 15:34

## 2025-03-01 RX ADMIN — ASPIRIN 81 MG CHEWABLE TABLET 81 MG: 81 TABLET CHEWABLE at 08:15

## 2025-03-01 RX ADMIN — INSULIN ASPART 3 UNITS: 100 INJECTION, SOLUTION INTRAVENOUS; SUBCUTANEOUS at 12:09

## 2025-03-01 ASSESSMENT — ACTIVITIES OF DAILY LIVING (ADL)
ADLS_ACUITY_SCORE: 27

## 2025-03-01 NOTE — PLAN OF CARE
Plan of Care Reviewed With: patient    Overall Patient Progress: no change    BP (!) 149/74 (BP Location: Right arm)   Pulse 107   Temp 98.1  F (36.7  C) (Tympanic)   Resp 24   Ht 1.829 m (6')   Wt 70.4 kg (155 lb 3.3 oz)   SpO2 94%   BMI 21.05 kg/m      A&O, VS as charted, HR tachy at times, highest temp 100.6 did receive PRN tylenol x1 last temp 98.1, denies pain, originally on 7L HFNC was able to titrate down to 5L for a bit, pt did have a coughing episode where he desated to 84%, increased o2 to 10L HFNC now on 6L HFNC, PRN neb given via RT, lungs dim, received PRN robitussin x2 for frequent productive cough, voiding independently in urinal, , 198, free from falls, call light within reach, makes needs known.        Face to face report given with opportunity to observe patient.    Report given to SAM Wallis RN   3/1/2025  7:03 AM

## 2025-03-01 NOTE — PLAN OF CARE
Goal Outcome Evaluation:       Plan of Care Reviewed With: patient    Overall Patient Progress: not progressing    Pt A&O, makes needs known. VSS, afebrile. Denied pain. Increased to 7-8L HFNC this shift, O2 sats low to mid 90's. Pt does desat to mid 80's with minimal activity with NETTLES. LS diminished. Frequent congested, productive cough. Robitussin given x1. HRR. Adequate urine output. IV SL. IV Abx continue. Call light within reach.    Face to face report given with opportunity to observe patient.    Report given to SAM Barry RN   2/28/2025  7:03 PM

## 2025-03-01 NOTE — PROGRESS NOTES
[facility]    Hospitalist Progress Note    Date of Service (when I saw the patient): 03/01/2025    Assessment & Plan   Jamie Chu is a 74 year old male who was admitted on 2/26/2025.    Acute hypoxic respiratory failure: Secondary to community-acquired pneumonia, diagnosed by CT with bibasilar infiltrates.  Patient also has history of COPD, not oxygen dependent at home.  No significant wheezing at bedside.  Denies any history of dysphagia, aspiration.  WBC is trending down currently at 15.6.  Patient is an ex-smoker.  -2/28: Patient feels more short of breath today, requiring up to 10 L high flow nasal cannula.  Lung sound clear.  Continuing scheduled DuoNebs, empiric ceftriaxone and doxycycline.  Continue aggressive pulmonary toilet with Acapella valve, incentive spirometer, ambulation as tolerated.  -3/1: Patient doing about the same.  Patient is on 8 L high flow nasal cannula.  Lungs sound clear.  Continue scheduled DuoNebs, ceftriaxone, doxycycline.  Continue pulmonary toilet.  Added prednisone despite no wheezing.    CVS: Patient has history of coronary disease, chronic diastolic CHF, hypertension.  EKG negative, troponins flat.  -Continue home medications.    Non-insulin-dependent diabetes mellitus type 2: Blood sugars are in the 200s here  -Okay to continue metformin  -Continue insulin sliding scale    FEN: Oral diet as tolerated    Clinically Significant Risk Factors               # Hypoalbuminemia: Lowest albumin = 3 g/dL at 2/27/2025  5:36 AM, will monitor as appropriate     # Hypertension: Noted on problem list     # Acute Hypoxic Respiratory Failure: Documented O2 saturation < 90%. Continue supplemental oxygen as needed                  DVT Prophylaxis: Pneumatic Compression Devices    Code Status: Special Code    Disposition: Expected discharge in 1-3 days once clinically improved, oxygen weaned.      Hal You MD, MD    Interval History   Patient seen in room.  Patient states that  he feels a little better today, but still with significant cough.  Still requiring 8 L high flow.    -Data reviewed today: I reviewed all new labs and imaging results over the last 24 hours. I personally reviewed imaging reports.    Physical Exam   Temp: 99.4  F (37.4  C) Temp src: Tympanic BP: 125/58 Pulse: 94   Resp: 20 SpO2: 92 % O2 Device: High Flow Nasal Cannula (HFNC) Oxygen Delivery: 8 LPM  Vitals:    02/26/25 1712 02/26/25 2130   Weight: 74.8 kg (164 lb 14.5 oz) 70.4 kg (155 lb 3.3 oz)     Vital Signs with Ranges  Temp:  [97.8  F (36.6  C)-100.6  F (38.1  C)] 99.4  F (37.4  C)  Pulse:  [] 94  Resp:  [18-26] 20  BP: (118-154)/(58-74) 125/58  SpO2:  [84 %-98 %] 92 %      Intake/Output Summary (Last 24 hours) at 3/1/2025 1117  Last data filed at 3/1/2025 0941  Gross per 24 hour   Intake 720 ml   Output 1175 ml   Net -455 ml         Peripheral IV 02/28/25 Left;Posterior Lower forearm (Active)   Site Assessment WDL 03/01/25 0756   Line Status Saline locked 03/01/25 0756   Dressing Transparent 03/01/25 0756   Dressing Status clean;dry;intact 03/01/25 0756   Line Intervention Flushed 03/01/25 0756   Phlebitis Scale 0-->no symptoms 03/01/25 0756   Infiltration? no 03/01/25 0756   Number of days: 1     No line/device    Constitutional - AA, NAD  HEENT - atraumatic, normocephalic  Neck - supple, no masses, no JVD  CVS - S1 S2 RRR, no murmurs, rubs, gallops  Respiratory - diminished breath sounds bilaterally but clear, no wheezes  GI - soft, NT, ND, + bowel sounds, no organomegaly  Musculoskeletal - no LE edema, no lesions  Neuro - oriented x 3, no gross focal deficits      Medications   Current Facility-Administered Medications   Medication Dose Route Frequency Provider Last Rate Last Admin     Current Facility-Administered Medications   Medication Dose Route Frequency Provider Last Rate Last Admin    amLODIPine (NORVASC) tablet 10 mg  10 mg Oral Mora Griffin MD   10 mg at 03/01/25 0815    aspirin (ASA)  chewable tablet 81 mg  81 mg Oral Mora Griffin MD   81 mg at 03/01/25 0815    atorvastatin (LIPITOR) tablet 20 mg  20 mg Oral Daily with supper Mora Smith MD   20 mg at 02/28/25 1654    cefTRIAXone IN D5W (ROCEPHIN) intermittent infusion 2 g  2 g Intravenous Q24H Mora Smith  mL/hr at 02/28/25 1652 2 g at 02/28/25 1652    doxycycline (VIBRAMYCIN) 100 mg vial to attach to  mL bag  100 mg Intravenous Q12H Mora Smith MD   100 mg at 03/01/25 0615    insulin aspart (NovoLOG) injection (RAPID ACTING)  1-7 Units Subcutaneous TID AC Mora Smith MD   1 Units at 02/28/25 1724    insulin aspart (NovoLOG) injection (RAPID ACTING)  1-5 Units Subcutaneous At Bedtime Mora Smith MD   1 Units at 02/28/25 2150    ipratropium - albuterol 0.5 mg/2.5 mg/3 mL (DUONEB) neb solution 3 mL  3 mL Nebulization 4x daily Mora Smith MD   3 mL at 03/01/25 1113    metoprolol succinate ER (TOPROL XL) 24 hr tablet 50 mg  50 mg Oral Mora Griffin MD   50 mg at 03/01/25 0815    predniSONE (DELTASONE) tablet 40 mg  40 mg Oral Daily Hal You MD   40 mg at 03/01/25 1037    sodium chloride (PF) 0.9% PF flush 3 mL  3 mL Intracatheter Q8H Jaskaran Alaniz MD   3 mL at 03/01/25 0000    sodium chloride (PF) 0.9% PF flush 3 mL  3 mL Intracatheter Q8H Mora Smith MD   3 mL at 03/01/25 0758       Data   Recent Labs   Lab 03/01/25  0814 03/01/25  0753 03/01/25  0207 02/27/25  0931 02/27/25  0536 02/26/25  2202 02/26/25  1639   WBC  --  13.3*  --   --  15.6*  --  19.4*   HGB  --  11.4*  --   --  12.0*  --  14.0   MCV  --  90  --   --  88  --  87   PLT  --  225  --   --  250  --  263   NA  --  136  --   --  136  --  135   POTASSIUM  --  3.8  --   --  4.1  --  3.7   CHLORIDE  --  99  --   --  99  --  95*   CO2  --  26  --   --  29  --  25   BUN  --  10.8  --   --  16.7  --  18.5   CR  --  0.99  --   --  1.12  --  1.16   ANIONGAP  --  11  --   --  8  --  15   RUMA  --  8.3*  --   --   8.5*  --  8.9   * 159* 198*   < > 253*   < > 287*   ALBUMIN  --   --   --   --  3.0*  --  3.6   PROTTOTAL  --   --   --   --  6.0*  --  7.0   BILITOTAL  --   --   --   --  0.9  --  1.6*   ALKPHOS  --   --   --   --  124  --  145   ALT  --   --   --   --  40  --  49   AST  --   --   --   --  36  --  52*    < > = values in this interval not displayed.     Lactic Acid   Date Value Ref Range Status   02/26/2025 1.1 0.7 - 2.0 mmol/L Final   01/16/2023 1.8 0.7 - 2.0 mmol/L Final   10/17/2022 1.6 0.7 - 2.0 mmol/L Final     Lactic Acid, Initial   Date Value Ref Range Status   02/26/2025 2.4 (H) 0.7 - 2.0 mmol/L Final       No results found for this or any previous visit (from the past 24 hours).      Hal You MD

## 2025-03-01 NOTE — PLAN OF CARE
Goal Outcome Evaluation:      Plan of Care Reviewed With: patient    Overall Patient Progress: improving    Alert and oriented. Denies pain this shift. Remains on oxygen via NC, desated to 84%, increased O2 to 8L, currently on 5L via high flow NC. Vitals and assessments as charted. Uses call light appropriately.     Face to face report given with opportunity to observe patient.    Report given to Asha Fox RN   3/1/2025  7:11 PM

## 2025-03-01 NOTE — PROGRESS NOTES
03/01/25 1430   Appointment Info   Signing Clinician's Name / Credentials (PT) Geetha Danielle, DPT   Living Environment   People in Home alone   Current Living Arrangements house   Home Accessibility stairs within home   Number of Stairs, Within Home, Primary greater than 10 stairs   Stair Railings, Within Home, Primary railings on both sides of stairs   Transportation Anticipated car, drives self   Living Environment Comments Pt reports having a ramp to enter home.  States laundry is located in basement but doesn't plan to go down there for awhile   Self-Care   Usual Activity Tolerance good   Current Activity Tolerance fair   Fall history within last six months no   Activity/Exercise/Self-Care Comment Pt states he has never had home O2, states he has always been able to get off of it before he leaves the hospital.  Pt reports being independent with all ADLs and ambulates without AD.  Pt drives   General Information   Onset of Illness/Injury or Date of Surgery 02/26/25   Referring Physician Dr. You   Patient/Family Therapy Goals Statement (PT) To get off oxygen and go home   Pertinent History of Current Problem (include personal factors and/or comorbidities that impact the POC) Pt admitted with acute respiratory failure with longstanding COPD.   Existing Precautions/Restrictions oxygen therapy device and L/min   General Observations Pt on 8L HFNC at rest with sats 92%   Cognition   Affect/Mental Status (Cognition) WFL   Orientation Status (Cognition) oriented x 3   Follows Commands (Cognition) WFL   Pain Assessment   Patient Currently in Pain No   Integumentary/Edema   Integumentary/Edema no deficits were identifed   Posture    Posture Protracted shoulders;Forward head position   Range of Motion (ROM)   Range of Motion ROM is WFL   Strength (Manual Muscle Testing)   Strength (Manual Muscle Testing) strength is WF   Bed Mobility   Bed Mobility no deficits identified   Transfers   Transfers no deficits identified    Gait/Stairs (Locomotion)   [place] Level (Gait) supervision   Assistive Device (Gait) other (see comments)  (pushed w/c only due to length of HFNC tubing, did not need for stability)   Distance in Feet (Gait) 70'   Pattern (Gait) step-through   Comment, (Gait/Stairs) O2 sats 86% with ambulation, mild dyspnea present.  Required >5 minutes for O2 sats to recover back to >90%.   Balance   Balance Comments good with and without support   Clinical Impression   Criteria for Skilled Therapeutic Intervention Yes, treatment indicated   PT Diagnosis (PT) decreased activity tolerance required for basic functional mobility and ADLs   Influenced by the following impairments decreased activity tolerance   Functional limitations due to impairments decreased ability to tolerate ADLs and mobility   Clinical Presentation (PT Evaluation Complexity) stable   Clinical Presentation Rationale clinical judgement   Clinical Decision Making (Complexity) low complexity   Planned Therapy Interventions (PT) gait training;progressive activity/exercise;patient/family education;stair training   Risk & Benefits of therapy have been explained evaluation/treatment results reviewed;care plan/treatment goals reviewed;risks/benefits reviewed;participants included;patient   Clinical Impression Comments PT evaluation completed.  Pt lives at home alone and reports being independent at baseline.  Pt currently demonstrates decreased activity tolerance for mobility and ADLs while also requiring 8L HFNC.  Pt would benefit from PT during acute care stay to progress activity tolerance as well as increased activity with nursing staff.  Anticipate pt will improve and be able to return home at discharge with no further skilled PT needs.   PT Total Evaluation Time   PT Dorita Low Complexity Minutes (18273) 11   Physical Therapy Goals   PT Frequency 6x/week   PT Predicted Duration/Target Date for Goal Attainment 03/15/25   PT Goals Gait;Stairs   PT: Gait  Independent;100 feet   PT: Stairs Modified independent;Greater than 10 stairs;Rail on both sides   Interventions   Interventions Quick Adds Therapeutic Activity   Therapeutic Activity   Therapeutic Activities: dynamic activities to improve functional performance Minutes (46858) 10   Symptoms Noted During/After Treatment Fatigue;Shortness of breath   Treatment Detail/Skilled Intervention After O2 sats recovered from initial ambulation, pt transferred sit>stand mod I from w/c.  Pt ambulated additional 70' pushing w/c due to length of O2 tubing with SBA.  Pt steady on feet with only mild dyspnea noted.  O2 sats 85% after second ambulation bout with recovery back to 90% wthin 3 minutes.  Provided cues for pursed lip breathing with both rest breaks.  RT present to give neb treatment at end of PT session.   PT Discharge Planning   PT Plan Progress activity tolerance   PT Discharge Recommendation (DC Rec) home   PT Rationale for DC Rec PT evaluation completed. Pt lives at home alone and reports being independent at baseline. Pt currently demonstrates decreased activity tolerance for mobility and ADLs while also requiring 8L HFNC. Pt would benefit from PT during acute care stay to progress activity tolerance as well as increased activity with nursing staff. Anticipate pt will improve and be able to return home at discharge with no further skilled PT needs.   PT Brief overview of current status sup>sit>stand independent.  Ambulated 70'x2 pushing w/c due to length of O2 tubing, not for stability, with SBA on 8L HFNC, sats 86-85% with recovery back to 90% within 3-5 minutes   PT Total Distance Amb During Session (feet) 140   Physical Therapy Time and Intention   Timed Code Treatment Minutes 10   Total Session Time (sum of timed and untimed services) 21

## 2025-03-02 LAB
ANION GAP SERPL CALCULATED.3IONS-SCNC: 8 MMOL/L (ref 7–15)
BUN SERPL-MCNC: 20.3 MG/DL (ref 8–23)
CALCIUM SERPL-MCNC: 8.6 MG/DL (ref 8.8–10.4)
CHLORIDE SERPL-SCNC: 100 MMOL/L (ref 98–107)
CREAT SERPL-MCNC: 1.1 MG/DL (ref 0.67–1.17)
CRP SERPL-MCNC: 160.34 MG/L
EGFRCR SERPLBLD CKD-EPI 2021: 71 ML/MIN/1.73M2
ERYTHROCYTE [DISTWIDTH] IN BLOOD BY AUTOMATED COUNT: 13.2 % (ref 10–15)
GLUCOSE BLDC GLUCOMTR-MCNC: 170 MG/DL (ref 70–99)
GLUCOSE BLDC GLUCOMTR-MCNC: 204 MG/DL (ref 70–99)
GLUCOSE BLDC GLUCOMTR-MCNC: 303 MG/DL (ref 70–99)
GLUCOSE BLDC GLUCOMTR-MCNC: 396 MG/DL (ref 70–99)
GLUCOSE BLDC GLUCOMTR-MCNC: 459 MG/DL (ref 70–99)
GLUCOSE SERPL-MCNC: 220 MG/DL (ref 70–99)
HCO3 SERPL-SCNC: 27 MMOL/L (ref 22–29)
HCT VFR BLD AUTO: 32.2 % (ref 40–53)
HGB BLD-MCNC: 11.2 G/DL (ref 13.3–17.7)
MCH RBC QN AUTO: 30.4 PG (ref 26.5–33)
MCHC RBC AUTO-ENTMCNC: 34.8 G/DL (ref 31.5–36.5)
MCV RBC AUTO: 88 FL (ref 78–100)
PLATELET # BLD AUTO: 281 10E3/UL (ref 150–450)
POTASSIUM SERPL-SCNC: 4.7 MMOL/L (ref 3.4–5.3)
RBC # BLD AUTO: 3.68 10E6/UL (ref 4.4–5.9)
SODIUM SERPL-SCNC: 135 MMOL/L (ref 135–145)
WBC # BLD AUTO: 12.1 10E3/UL (ref 4–11)

## 2025-03-02 PROCEDURE — 250N000013 HC RX MED GY IP 250 OP 250 PS 637: Performed by: INTERNAL MEDICINE

## 2025-03-02 PROCEDURE — 85018 HEMOGLOBIN: CPT | Performed by: INTERNAL MEDICINE

## 2025-03-02 PROCEDURE — 80048 BASIC METABOLIC PNL TOTAL CA: CPT | Performed by: INTERNAL MEDICINE

## 2025-03-02 PROCEDURE — 999N000157 HC STATISTIC RCP TIME EA 10 MIN

## 2025-03-02 PROCEDURE — 250N000011 HC RX IP 250 OP 636: Performed by: INTERNAL MEDICINE

## 2025-03-02 PROCEDURE — 94640 AIRWAY INHALATION TREATMENT: CPT | Mod: 76

## 2025-03-02 PROCEDURE — 120N000001 HC R&B MED SURG/OB

## 2025-03-02 PROCEDURE — 250N000012 HC RX MED GY IP 250 OP 636 PS 637: Performed by: INTERNAL MEDICINE

## 2025-03-02 PROCEDURE — 36415 COLL VENOUS BLD VENIPUNCTURE: CPT | Performed by: INTERNAL MEDICINE

## 2025-03-02 PROCEDURE — 94799 UNLISTED PULMONARY SVC/PX: CPT

## 2025-03-02 PROCEDURE — 85048 AUTOMATED LEUKOCYTE COUNT: CPT | Performed by: INTERNAL MEDICINE

## 2025-03-02 PROCEDURE — 82565 ASSAY OF CREATININE: CPT | Performed by: INTERNAL MEDICINE

## 2025-03-02 PROCEDURE — 82435 ASSAY OF BLOOD CHLORIDE: CPT | Performed by: INTERNAL MEDICINE

## 2025-03-02 PROCEDURE — 94640 AIRWAY INHALATION TREATMENT: CPT

## 2025-03-02 PROCEDURE — 250N000009 HC RX 250: Performed by: INTERNAL MEDICINE

## 2025-03-02 PROCEDURE — 86140 C-REACTIVE PROTEIN: CPT | Performed by: INTERNAL MEDICINE

## 2025-03-02 RX ADMIN — IPRATROPIUM BROMIDE AND ALBUTEROL SULFATE 3 ML: .5; 3 SOLUTION RESPIRATORY (INHALATION) at 07:31

## 2025-03-02 RX ADMIN — CEFTRIAXONE SODIUM 2 G: 2 INJECTION, SOLUTION INTRAVENOUS at 16:39

## 2025-03-02 RX ADMIN — ATORVASTATIN CALCIUM 20 MG: 20 TABLET, FILM COATED ORAL at 16:39

## 2025-03-02 RX ADMIN — METOPROLOL SUCCINATE 50 MG: 50 TABLET, EXTENDED RELEASE ORAL at 08:28

## 2025-03-02 RX ADMIN — PREDNISONE 40 MG: 20 TABLET ORAL at 08:29

## 2025-03-02 RX ADMIN — ASPIRIN 81 MG CHEWABLE TABLET 81 MG: 81 TABLET CHEWABLE at 08:28

## 2025-03-02 RX ADMIN — DOXYCYCLINE 100 MG: 100 INJECTION, POWDER, LYOPHILIZED, FOR SOLUTION INTRAVENOUS at 06:04

## 2025-03-02 RX ADMIN — INSULIN ASPART 4 UNITS: 100 INJECTION, SOLUTION INTRAVENOUS; SUBCUTANEOUS at 12:17

## 2025-03-02 RX ADMIN — IPRATROPIUM BROMIDE AND ALBUTEROL SULFATE 3 ML: .5; 3 SOLUTION RESPIRATORY (INHALATION) at 19:45

## 2025-03-02 RX ADMIN — IPRATROPIUM BROMIDE AND ALBUTEROL SULFATE 3 ML: .5; 3 SOLUTION RESPIRATORY (INHALATION) at 16:08

## 2025-03-02 RX ADMIN — IPRATROPIUM BROMIDE AND ALBUTEROL SULFATE 3 ML: .5; 3 SOLUTION RESPIRATORY (INHALATION) at 11:09

## 2025-03-02 RX ADMIN — INSULIN ASPART 7 UNITS: 100 INJECTION, SOLUTION INTRAVENOUS; SUBCUTANEOUS at 17:05

## 2025-03-02 RX ADMIN — AMLODIPINE BESYLATE 10 MG: 5 TABLET ORAL at 08:29

## 2025-03-02 RX ADMIN — INSULIN ASPART 1 UNITS: 100 INJECTION, SOLUTION INTRAVENOUS; SUBCUTANEOUS at 08:24

## 2025-03-02 RX ADMIN — DOXYCYCLINE 100 MG: 100 INJECTION, POWDER, LYOPHILIZED, FOR SOLUTION INTRAVENOUS at 17:42

## 2025-03-02 RX ADMIN — GUAIFENESIN AND CODEINE PHOSPHATE 5 ML: 100; 10 SOLUTION ORAL at 04:55

## 2025-03-02 ASSESSMENT — ACTIVITIES OF DAILY LIVING (ADL)
ADLS_ACUITY_SCORE: 28
ADLS_ACUITY_SCORE: 27
ADLS_ACUITY_SCORE: 27
ADLS_ACUITY_SCORE: 28
ADLS_ACUITY_SCORE: 28
ADLS_ACUITY_SCORE: 27
ADLS_ACUITY_SCORE: 28
ADLS_ACUITY_SCORE: 27
ADLS_ACUITY_SCORE: 27
ADLS_ACUITY_SCORE: 26
ADLS_ACUITY_SCORE: 26
ADLS_ACUITY_SCORE: 27
ADLS_ACUITY_SCORE: 26
ADLS_ACUITY_SCORE: 28
ADLS_ACUITY_SCORE: 26
ADLS_ACUITY_SCORE: 28
ADLS_ACUITY_SCORE: 28
ADLS_ACUITY_SCORE: 27
ADLS_ACUITY_SCORE: 28
ADLS_ACUITY_SCORE: 28

## 2025-03-02 NOTE — PROGRESS NOTES
[facility]    Hospitalist Progress Note    Date of Service (when I saw the patient): 03/02/2025    Assessment & Plan   Jamie Chu is a 74 year old male who was admitted on 2/26/2025.    Acute hypoxic respiratory failure: Secondary to community-acquired pneumonia, diagnosed by CT with bibasilar infiltrates.  Patient also has history of COPD, not oxygen dependent at home.  No significant wheezing at bedside.  Denies any history of dysphagia, aspiration.  WBC is trending down currently at 15.6.  Patient is an ex-smoker.  -2/28: Patient feels more short of breath today, requiring up to 10 L high flow nasal cannula.  Lung sound clear.  Continuing scheduled DuoNebs, empiric ceftriaxone and doxycycline.  Continue aggressive pulmonary toilet with Acapella valve, incentive spirometer, ambulation as tolerated.  -3/1: Patient doing about the same.  Patient is on 8 L high flow nasal cannula.  Lungs sound clear.  Continue scheduled DuoNebs, ceftriaxone, doxycycline.  Continue pulmonary toilet.  Added prednisone despite no wheezing.  -3/2: Patient improving.  Down to 4 L nasal cannula.  Added prednisone yesterday, seems to be effective.  Continuing empiric DuoNebs, doxycycline, ceftriaxone.  Weaning supplemental oxygen as able.  Continue working with PT on mobility.    CVS: Patient has history of coronary disease, chronic diastolic CHF, hypertension.  EKG negative, troponins flat.  -Continue home medications.    Non-insulin-dependent diabetes mellitus type 2: Blood sugars are in the 200s here  -Okay to continue metformin  -Continue insulin sliding scale    FEN: Oral diet as tolerated    Clinically Significant Risk Factors               # Hypoalbuminemia: Lowest albumin = 3 g/dL at 2/27/2025  5:36 AM, will monitor as appropriate     # Hypertension: Noted on problem list                     DVT Prophylaxis: Pneumatic Compression Devices    Code Status: Special Code    Disposition: Expected discharge in 1-2 days  once clinically improved, oxygen weaned.      Hal You MD, MD    Interval History   Patient seen in room.  Patient states he feels significantly better today.  He is down to.  4 L nasal cannula.  Coughing improving.    -Data reviewed today: I reviewed all new labs and imaging results over the last 24 hours. I personally reviewed imaging reports.    Physical Exam   Temp: 97.3  F (36.3  C) Temp src: Tympanic BP: 130/48 Pulse: 71   Resp: 18 SpO2: (!) 90 % O2 Device: Nasal cannula with humidification Oxygen Delivery: 4 LPM  Vitals:    02/26/25 1712 02/26/25 2130   Weight: 74.8 kg (164 lb 14.5 oz) 70.4 kg (155 lb 3.3 oz)     Vital Signs with Ranges  Temp:  [97.1  F (36.2  C)-97.9  F (36.6  C)] 97.3  F (36.3  C)  Pulse:  [71-88] 71  Resp:  [18-20] 18  BP: (121-146)/(48-67) 130/48  SpO2:  [87 %-96 %] 90 %        Intake/Output Summary (Last 24 hours) at 3/2/2025 1124  Last data filed at 3/2/2025 1024  Gross per 24 hour   Intake 720 ml   Output 1950 ml   Net -1230 ml         Peripheral IV Right Lower forearm (Active)   Site Assessment WDL 03/02/25 0730   Line Status Saline locked 03/02/25 0730   Dressing Transparent 03/02/25 0730   Dressing Status clean;dry;intact 03/02/25 0730   Line Intervention Flushed 03/02/25 0730   Phlebitis Scale 0-->no symptoms 03/02/25 0730   Infiltration? no 03/02/25 0730   Number of days:      No line/device    Constitutional - AA, NAD  HEENT - atraumatic, normocephalic  Neck - supple, no masses, no JVD  CVS - S1 S2 RRR, no murmurs, rubs, gallops  Respiratory - diminished breath sounds bilaterally but clear, no wheezes  GI - soft, NT, ND, + bowel sounds, no organomegaly  Musculoskeletal - no LE edema, no lesions  Neuro - oriented x 3, no gross focal deficits      Medications   Current Facility-Administered Medications   Medication Dose Route Frequency Provider Last Rate Last Admin     Current Facility-Administered Medications   Medication Dose Route Frequency Provider Last Rate Last Admin     amLODIPine (NORVASC) tablet 10 mg  10 mg Oral Mora Griffin MD   10 mg at 03/02/25 0829    aspirin (ASA) chewable tablet 81 mg  81 mg Oral Mora Griffin MD   81 mg at 03/02/25 0828    atorvastatin (LIPITOR) tablet 20 mg  20 mg Oral Daily with supper Mora Smith MD   20 mg at 03/01/25 1641    cefTRIAXone IN D5W (ROCEPHIN) intermittent infusion 2 g  2 g Intravenous Q24H Mora Smith  mL/hr at 03/01/25 1641 2 g at 03/01/25 1641    doxycycline (VIBRAMYCIN) 100 mg vial to attach to  mL bag  100 mg Intravenous Q12H Mora Smith MD   100 mg at 03/02/25 0604    insulin aspart (NovoLOG) injection (RAPID ACTING)  1-7 Units Subcutaneous TID AC Mora Smith MD   1 Units at 03/02/25 0824    insulin aspart (NovoLOG) injection (RAPID ACTING)  1-5 Units Subcutaneous At Bedtime Mora Smith MD   2 Units at 03/01/25 2200    ipratropium - albuterol 0.5 mg/2.5 mg/3 mL (DUONEB) neb solution 3 mL  3 mL Nebulization 4x daily Mora Smith MD   3 mL at 03/02/25 1109    metoprolol succinate ER (TOPROL XL) 24 hr tablet 50 mg  50 mg Oral Mora Griffin MD   50 mg at 03/02/25 0828    predniSONE (DELTASONE) tablet 40 mg  40 mg Oral Daily Hal You MD   40 mg at 03/02/25 0829    sodium chloride (PF) 0.9% PF flush 3 mL  3 mL Intracatheter Q8H Jaskaran Alaniz MD   3 mL at 03/02/25 0214    sodium chloride (PF) 0.9% PF flush 3 mL  3 mL Intracatheter Q8H Mora Smith MD   3 mL at 03/02/25 0830       Data   Recent Labs   Lab 03/02/25  0823 03/02/25  0446 03/02/25  0211 03/01/25  0814 03/01/25  0753 02/27/25  0931 02/27/25  0536 02/26/25  2202 02/26/25  1639   WBC  --  12.1*  --   --  13.3*  --  15.6*  --  19.4*   HGB  --  11.2*  --   --  11.4*  --  12.0*  --  14.0   MCV  --  88  --   --  90  --  88  --  87   PLT  --  281  --   --  225  --  250  --  263   NA  --  135  --   --  136  --  136  --  135   POTASSIUM  --  4.7  --   --  3.8  --  4.1  --  3.7   CHLORIDE  --  100  --    --  99  --  99  --  95*   CO2  --  27  --   --  26  --  29  --  25   BUN  --  20.3  --   --  10.8  --  16.7  --  18.5   CR  --  1.10  --   --  0.99  --  1.12  --  1.16   ANIONGAP  --  8  --   --  11  --  8  --  15   RUMA  --  8.6*  --   --  8.3*  --  8.5*  --  8.9   * 220* 204*   < > 159*   < > 253*   < > 287*   ALBUMIN  --   --   --   --   --   --  3.0*  --  3.6   PROTTOTAL  --   --   --   --   --   --  6.0*  --  7.0   BILITOTAL  --   --   --   --   --   --  0.9  --  1.6*   ALKPHOS  --   --   --   --   --   --  124  --  145   ALT  --   --   --   --   --   --  40  --  49   AST  --   --   --   --   --   --  36  --  52*    < > = values in this interval not displayed.     Lactic Acid   Date Value Ref Range Status   02/26/2025 1.1 0.7 - 2.0 mmol/L Final   01/16/2023 1.8 0.7 - 2.0 mmol/L Final   10/17/2022 1.6 0.7 - 2.0 mmol/L Final     Lactic Acid, Initial   Date Value Ref Range Status   02/26/2025 2.4 (H) 0.7 - 2.0 mmol/L Final       No results found for this or any previous visit (from the past 24 hours).      Hal You MD

## 2025-03-02 NOTE — PLAN OF CARE
Plan of Care Reviewed With: patient    Overall Patient Progress: progressing    BP (!) 146/67   Pulse 71   Temp 97.4  F (36.3  C) (Tympanic)   Resp 19   Ht 1.829 m (6')   Wt 70.4 kg (155 lb 3.3 oz)   SpO2 93%   BMI 21.05 kg/m      A&O, VS as charted, afebrile, denies pain, PIV SL, originally on 5L HFNC, on 4 L humidified NC now lungs clear/dim, freq congested productive cough received PRN robitussin x1, voiding independently in urinal, , 204, and 220 with am lab, call light within reach, makes needs known.     Face to face report given with opportunity to observe patient.    Report given to SAM Vernon RN   3/2/2025  7:12 AM

## 2025-03-02 NOTE — PLAN OF CARE
Face to face report given with opportunity to observe patient.    Report given to SAM Case RN   3/2/2025  9:26 AM

## 2025-03-02 NOTE — PLAN OF CARE
Ambulated in back su with SBA holding onto wheelchair. Tolerated 4 L with no increased work of breathing. States the walk was better than yesterday. Sats 87% when back to room, up to 90% within 1-2 min.

## 2025-03-02 NOTE — PHARMACY-MEDICATION REGIMEN REVIEW
Pharmacy Antimicrobial Stewardship Assessment     Current Antimicrobial Therapy:  Anti-infectives (From now, onward)      Start     Dose/Rate Route Frequency Ordered Stop    25 1600  cefTRIAXone IN D5W (ROCEPHIN) intermittent infusion 2 g         2 g  100 mL/hr over 30 Minutes Intravenous EVERY 24 HOURS 25 2356      25 0600  doxycycline (VIBRAMYCIN) 100 mg vial to attach to  mL bag         100 mg  over 1-2 Hours Intravenous EVERY 12 HOURS 25          Indication: CAP    Days of Therapy: 5     Pertinent Labs:  Recent Labs   Lab Test 25  0446 25  0753 25  0536 25  1639   WBC 12.1* 13.3* 15.6* 19.4*       Recent Labs   Lab Test 25  0446 25  0753 25  1856 25  1639   LACT  --   --  1.1 2.4*   CRPI 160.34* 176.96*  --  262.03*   PCAL  --   --   --  0.43      Temperature:  Temp (24hrs), Av.4  F (36.3  C), Min:97.1  F (36.2  C), Max:97.9  F (36.6  C)    Culture Results:   30-Day Micro Results       Collected Updated Procedure Result Status      2025 1714 2025 1757 Influenza A/B, RSV and SARS-CoV2 PCR (COVID-19) Nose [[id number]]    Swab from Nose    Final result Component Value   Influenza A PCR Negative   Influenza B PCR Negative   RSV PCR Negative   SARS CoV2 PCR Negative   NEGATIVE: SARS-CoV-2 (COVID-19) RNA not detected, presumed negative.            2025 1700 2025 0700 Blood Culture Wrist, Right [[id number]]   Blood from Wrist, Right    Preliminary result Component Value   Culture No growth after 3 days  [P]                2025 1639 2025 0700 Blood Culture Peripheral Blood [[id number]]   Peripheral Blood    Preliminary result Component Value   Culture No growth after 3 days  [P]                      Recommendations/Interventions:  None at this time    Charlette Suh, [facility]  2025

## 2025-03-03 LAB
ANION GAP SERPL CALCULATED.3IONS-SCNC: 9 MMOL/L (ref 7–15)
BUN SERPL-MCNC: 22.5 MG/DL (ref 8–23)
CALCIUM SERPL-MCNC: 8.5 MG/DL (ref 8.8–10.4)
CHLORIDE SERPL-SCNC: 99 MMOL/L (ref 98–107)
CREAT SERPL-MCNC: 1.12 MG/DL (ref 0.67–1.17)
EGFRCR SERPLBLD CKD-EPI 2021: 69 ML/MIN/1.73M2
ERYTHROCYTE [DISTWIDTH] IN BLOOD BY AUTOMATED COUNT: 13.2 % (ref 10–15)
GLUCOSE BLDC GLUCOMTR-MCNC: 229 MG/DL (ref 70–99)
GLUCOSE BLDC GLUCOMTR-MCNC: 268 MG/DL (ref 70–99)
GLUCOSE BLDC GLUCOMTR-MCNC: 290 MG/DL (ref 70–99)
GLUCOSE BLDC GLUCOMTR-MCNC: 371 MG/DL (ref 70–99)
GLUCOSE BLDC GLUCOMTR-MCNC: 451 MG/DL (ref 70–99)
GLUCOSE SERPL-MCNC: 298 MG/DL (ref 70–99)
HCO3 SERPL-SCNC: 27 MMOL/L (ref 22–29)
HCT VFR BLD AUTO: 31.9 % (ref 40–53)
HGB BLD-MCNC: 11.1 G/DL (ref 13.3–17.7)
MCH RBC QN AUTO: 30.5 PG (ref 26.5–33)
MCHC RBC AUTO-ENTMCNC: 34.8 G/DL (ref 31.5–36.5)
MCV RBC AUTO: 88 FL (ref 78–100)
PLATELET # BLD AUTO: 312 10E3/UL (ref 150–450)
POTASSIUM SERPL-SCNC: 4.7 MMOL/L (ref 3.4–5.3)
RBC # BLD AUTO: 3.64 10E6/UL (ref 4.4–5.9)
SODIUM SERPL-SCNC: 135 MMOL/L (ref 135–145)
WBC # BLD AUTO: 10.2 10E3/UL (ref 4–11)

## 2025-03-03 PROCEDURE — 250N000011 HC RX IP 250 OP 636: Performed by: INTERNAL MEDICINE

## 2025-03-03 PROCEDURE — 94640 AIRWAY INHALATION TREATMENT: CPT

## 2025-03-03 PROCEDURE — 999N000157 HC STATISTIC RCP TIME EA 10 MIN

## 2025-03-03 PROCEDURE — 85048 AUTOMATED LEUKOCYTE COUNT: CPT | Performed by: INTERNAL MEDICINE

## 2025-03-03 PROCEDURE — 250N000009 HC RX 250: Performed by: INTERNAL MEDICINE

## 2025-03-03 PROCEDURE — 85014 HEMATOCRIT: CPT | Performed by: INTERNAL MEDICINE

## 2025-03-03 PROCEDURE — 36415 COLL VENOUS BLD VENIPUNCTURE: CPT | Performed by: INTERNAL MEDICINE

## 2025-03-03 PROCEDURE — 80048 BASIC METABOLIC PNL TOTAL CA: CPT | Performed by: INTERNAL MEDICINE

## 2025-03-03 PROCEDURE — 82310 ASSAY OF CALCIUM: CPT | Performed by: INTERNAL MEDICINE

## 2025-03-03 PROCEDURE — 94640 AIRWAY INHALATION TREATMENT: CPT | Mod: 76

## 2025-03-03 PROCEDURE — 250N000012 HC RX MED GY IP 250 OP 636 PS 637: Performed by: INTERNAL MEDICINE

## 2025-03-03 PROCEDURE — 120N000001 HC R&B MED SURG/OB

## 2025-03-03 PROCEDURE — 82565 ASSAY OF CREATININE: CPT | Performed by: INTERNAL MEDICINE

## 2025-03-03 PROCEDURE — 250N000013 HC RX MED GY IP 250 OP 250 PS 637: Performed by: INTERNAL MEDICINE

## 2025-03-03 RX ADMIN — PREDNISONE 40 MG: 20 TABLET ORAL at 08:58

## 2025-03-03 RX ADMIN — DOXYCYCLINE 100 MG: 100 INJECTION, POWDER, LYOPHILIZED, FOR SOLUTION INTRAVENOUS at 05:41

## 2025-03-03 RX ADMIN — DOXYCYCLINE 100 MG: 100 INJECTION, POWDER, LYOPHILIZED, FOR SOLUTION INTRAVENOUS at 18:32

## 2025-03-03 RX ADMIN — ATORVASTATIN CALCIUM 20 MG: 20 TABLET, FILM COATED ORAL at 17:47

## 2025-03-03 RX ADMIN — INSULIN ASPART 5 UNITS: 100 INJECTION, SOLUTION INTRAVENOUS; SUBCUTANEOUS at 18:33

## 2025-03-03 RX ADMIN — IPRATROPIUM BROMIDE AND ALBUTEROL SULFATE 3 ML: .5; 3 SOLUTION RESPIRATORY (INHALATION) at 15:51

## 2025-03-03 RX ADMIN — INSULIN ASPART 2 UNITS: 100 INJECTION, SOLUTION INTRAVENOUS; SUBCUTANEOUS at 08:56

## 2025-03-03 RX ADMIN — METOPROLOL SUCCINATE 50 MG: 50 TABLET, EXTENDED RELEASE ORAL at 08:58

## 2025-03-03 RX ADMIN — IPRATROPIUM BROMIDE AND ALBUTEROL SULFATE 3 ML: .5; 3 SOLUTION RESPIRATORY (INHALATION) at 07:40

## 2025-03-03 RX ADMIN — ASPIRIN 81 MG CHEWABLE TABLET 81 MG: 81 TABLET CHEWABLE at 08:58

## 2025-03-03 RX ADMIN — AMLODIPINE BESYLATE 10 MG: 5 TABLET ORAL at 08:58

## 2025-03-03 RX ADMIN — IPRATROPIUM BROMIDE AND ALBUTEROL SULFATE 3 ML: .5; 3 SOLUTION RESPIRATORY (INHALATION) at 11:41

## 2025-03-03 RX ADMIN — CEFTRIAXONE SODIUM 2 G: 2 INJECTION, SOLUTION INTRAVENOUS at 16:24

## 2025-03-03 RX ADMIN — INSULIN ASPART 3 UNITS: 100 INJECTION, SOLUTION INTRAVENOUS; SUBCUTANEOUS at 13:07

## 2025-03-03 RX ADMIN — IPRATROPIUM BROMIDE AND ALBUTEROL SULFATE 3 ML: .5; 3 SOLUTION RESPIRATORY (INHALATION) at 19:32

## 2025-03-03 ASSESSMENT — ACTIVITIES OF DAILY LIVING (ADL)
ADLS_ACUITY_SCORE: 26
ADLS_ACUITY_SCORE: 28
ADLS_ACUITY_SCORE: 26
ADLS_ACUITY_SCORE: 28
ADLS_ACUITY_SCORE: 28
ADLS_ACUITY_SCORE: 26
ADLS_ACUITY_SCORE: 30
ADLS_ACUITY_SCORE: 26
ADLS_ACUITY_SCORE: 28
ADLS_ACUITY_SCORE: 28
ADLS_ACUITY_SCORE: 26
ADLS_ACUITY_SCORE: 28
ADLS_ACUITY_SCORE: 26
ADLS_ACUITY_SCORE: 26
ADLS_ACUITY_SCORE: 28
ADLS_ACUITY_SCORE: 26
ADLS_ACUITY_SCORE: 28
ADLS_ACUITY_SCORE: 26
ADLS_ACUITY_SCORE: 28
ADLS_ACUITY_SCORE: 28
ADLS_ACUITY_SCORE: 26

## 2025-03-03 NOTE — PHARMACY-MEDICATION REGIMEN REVIEW
Pharmacy Antimicrobial Stewardship Assessment     Current Antimicrobial Therapy:  Anti-infectives (From now, onward)      Start     Dose/Rate Route Frequency Ordered Stop    25 1600  cefTRIAXone IN D5W (ROCEPHIN) intermittent infusion 2 g         2 g  100 mL/hr over 30 Minutes Intravenous EVERY 24 HOURS 25 2356      25 0600  doxycycline (VIBRAMYCIN) 100 mg vial to attach to  mL bag         100 mg  over 1-2 Hours Intravenous EVERY 12 HOURS 25            Indication: CAP    Days of Therapy: 6     Pertinent Labs:    Recent Labs   Lab Test 25  0540 25  0446 25  0753 25  0536 25  1639   WBC 10.2 12.1* 13.3* 15.6* 19.4*       Recent Labs   Lab Test 25  0446 25  0753 25  1856 25  1639   LACT  --   --  1.1 2.4*   CRPI 160.34* 176.96*  --  262.03*   PCAL  --   --   --  0.43        Temperature:  Temp (24hrs), Av.8  F (36.6  C), Min:97.1  F (36.2  C), Max:98.3  F (36.8  C)      Culture Results:   30-Day Micro Results       Collected Updated Procedure Result Status      2025 1714 2025 1757 Influenza A/B, RSV and SARS-CoV2 PCR (COVID-19) Nose [[id number]]    Swab from Nose    Final result Component Value   Influenza A PCR Negative   Influenza B PCR Negative   RSV PCR Negative   SARS CoV2 PCR Negative   NEGATIVE: SARS-CoV-2 (COVID-19) RNA not detected, presumed negative.            2025 1700 2025 0735 Blood Culture Wrist, Right [[id number]]   Blood from Wrist, Right    Preliminary result Component Value   Culture No growth after 4 days  [P]                2025 1639 2025 0735 Blood Culture Peripheral Blood [[id number]]   Peripheral Blood    Preliminary result Component Value   Culture No growth after 4 days  [P]                      Recommendations/Interventions:  1. None at this time.     Monica Fox [facility]  March 3, 2025

## 2025-03-03 NOTE — PROGRESS NOTES
03/03/25 1300   Appointment Info   Signing Clinician's Name / Credentials (PT) Regan Shah DPT   Appointment Canceled Reason (PT) Patient declined   Appointment Cancel Comments (PT) Pt just was delivered food prior to PT attempting. Will attempt later today if time allows

## 2025-03-03 NOTE — PLAN OF CARE
Plan of Care Reviewed With: patient     Overall Patient Progress: progressing    /64 (BP Location: Right arm)   Pulse 79   Temp 98.3  F (36.8  C) (Tympanic)   Resp 16   Ht 1.829 m (6')   Wt 70.4 kg (155 lb 3.3 oz)   SpO2 94%   BMI 21.05 kg/m      A&O, VSS, afebrile, denies pain, remains on 3L NC, lungs clear/dim, infreq congested productive cough per pt stating grayish in color, refused PRN robitussin, voiding spontaneously, PIV SL, receiving IV doxy and rocephin per MAR, , 290, 298 with am lab, free from falls, ambulating to bathroom independently, call light within reach, makes needs known.     Face to face report given with opportunity to observe patient.    Report given to SAM Alcaraz RN   3/3/2025  7:11 AM

## 2025-03-03 NOTE — PROGRESS NOTES
[facility]    Hospitalist Progress Note    Date of Service (when I saw the patient): 03/03/2025    Assessment & Plan   Jamie Chu is a 74 year old male who was admitted on 2/26/2025.    Acute hypoxic respiratory failure: Secondary to community-acquired pneumonia, diagnosed by CT with bibasilar infiltrates.  Patient also has history of COPD, not oxygen dependent at home.  No significant wheezing at bedside.  Denies any history of dysphagia, aspiration.  WBC is trending down currently at 15.6.  Patient is an ex-smoker.  -2/28: Patient feels more short of breath today, requiring up to 10 L high flow nasal cannula.  Lung sound clear.  Continuing scheduled DuoNebs, empiric ceftriaxone and doxycycline.  Continue aggressive pulmonary toilet with Acapella valve, incentive spirometer, ambulation as tolerated.  -3/1: Patient doing about the same.  Patient is on 8 L high flow nasal cannula.  Lungs sound clear.  Continue scheduled DuoNebs, ceftriaxone, doxycycline.  Continue pulmonary toilet.  Added prednisone despite no wheezing.  -3/2: Patient improving.  Down to 4 L nasal cannula.  Added prednisone yesterday, seems to be effective.  Continuing empiric DuoNebs, doxycycline, ceftriaxone.  Weaning supplemental oxygen as able.  Continue working with PT on mobility.  -3/3: Patient improving yet.  Down to 2 L nasal cannula.  Continuing prednisone, DuoNebs, doxycycline, ceftriaxone.  Doing well with PT with mobility.  Anticipate discharge in next day or so.    CVS: Patient has history of coronary disease, chronic diastolic CHF, hypertension.  EKG negative, troponins flat.  -Continue home medications.    Non-insulin-dependent diabetes mellitus type 2: Blood sugars are in the 200s here  -Okay to continue metformin  -Continue insulin sliding scale    FEN: Oral diet as tolerated    Clinically Significant Risk Factors               # Hypoalbuminemia: Lowest albumin = 3 g/dL at 2/27/2025  5:36 AM, will monitor as  appropriate     # Hypertension: Noted on problem list                     DVT Prophylaxis: Pneumatic Compression Devices    Code Status: Special Code    Disposition: Expected discharge in 1-2 days once clinically improved, oxygen weaned.      Hal You MD, MD    Interval History   Patient seen in room.  Patient states he feels significantly better today.  He is down to 2 L nasal cannula.  Cough improving.  No fevers.    -Data reviewed today: I reviewed all new labs and imaging results over the last 24 hours. I personally reviewed imaging reports.    Physical Exam   Temp: 97.1  F (36.2  C) Temp src: Tympanic BP: (!) 142/63 Pulse: 81   Resp: 16 SpO2: (!) 90 % O2 Device: Nasal cannula with humidification Oxygen Delivery: 2 LPM  Vitals:    02/26/25 1712 02/26/25 2130   Weight: 74.8 kg (164 lb 14.5 oz) 70.4 kg (155 lb 3.3 oz)     Vital Signs with Ranges  Temp:  [97.1  F (36.2  C)-98.3  F (36.8  C)] 97.1  F (36.2  C)  Pulse:  [74-85] 81  Resp:  [16-18] 16  BP: (112-142)/(61-64) 142/63  SpO2:  [90 %-95 %] 90 %      Intake/Output Summary (Last 24 hours) at 3/3/2025 0927  Last data filed at 3/3/2025 0644  Gross per 24 hour   Intake 1361 ml   Output --   Net 1361 ml         Peripheral IV Right Lower forearm (Active)   Site Assessment WDL 03/03/25 0535   Line Status Saline locked 03/03/25 0535   Dressing Transparent 03/03/25 0535   Dressing Status clean;dry;intact 03/03/25 0535   Line Intervention Flushed 03/03/25 0535   Phlebitis Scale 0-->no symptoms 03/03/25 0535   Infiltration? no 03/03/25 0535   Number of days:      No line/device    Constitutional - AA, NAD  HEENT - atraumatic, normocephalic  Neck - supple, no masses, no JVD  CVS - S1 S2 RRR, no murmurs, rubs, gallops  Respiratory - diminished breath sounds bilaterally but clear, no wheezes  GI - soft, NT, ND, + bowel sounds, no organomegaly  Musculoskeletal - no LE edema, no lesions  Neuro - oriented x 3, no gross focal deficits      Medications   Current  Facility-Administered Medications   Medication Dose Route Frequency Provider Last Rate Last Admin     Current Facility-Administered Medications   Medication Dose Route Frequency Provider Last Rate Last Admin    amLODIPine (NORVASC) tablet 10 mg  10 mg Oral Mora Griffin MD   10 mg at 03/03/25 0858    aspirin (ASA) chewable tablet 81 mg  81 mg Oral Mora Griffin MD   81 mg at 03/03/25 0858    atorvastatin (LIPITOR) tablet 20 mg  20 mg Oral Daily with supper Mora Smith MD   20 mg at 03/02/25 1639    cefTRIAXone IN D5W (ROCEPHIN) intermittent infusion 2 g  2 g Intravenous Q24H Mora Smith  mL/hr at 03/02/25 1639 2 g at 03/02/25 1639    doxycycline (VIBRAMYCIN) 100 mg vial to attach to  mL bag  100 mg Intravenous Q12H Mora Smith MD   100 mg at 03/03/25 0541    insulin aspart (NovoLOG) injection (RAPID ACTING)  1-7 Units Subcutaneous TID AC Mora Smith MD   2 Units at 03/03/25 0856    insulin aspart (NovoLOG) injection (RAPID ACTING)  1-5 Units Subcutaneous At Bedtime Mora Smith MD   4 Units at 03/02/25 2215    ipratropium - albuterol 0.5 mg/2.5 mg/3 mL (DUONEB) neb solution 3 mL  3 mL Nebulization 4x daily Mora Smith MD   3 mL at 03/03/25 0740    metoprolol succinate ER (TOPROL XL) 24 hr tablet 50 mg  50 mg Oral Mora Griffin MD   50 mg at 03/03/25 0858    predniSONE (DELTASONE) tablet 40 mg  40 mg Oral Daily Hal You MD   40 mg at 03/03/25 0858    sodium chloride (PF) 0.9% PF flush 3 mL  3 mL Intracatheter Q8H Jaskaran Alaniz MD   3 mL at 03/03/25 0904    sodium chloride (PF) 0.9% PF flush 3 mL  3 mL Intracatheter Q8H Mora Smith MD   3 mL at 03/02/25 1639       Data   Recent Labs   Lab 03/03/25  0830 03/03/25  0540 03/03/25  0140 03/02/25  0823 03/02/25  0446 03/01/25  0814 03/01/25  0753 02/27/25  0931 02/27/25  0536 02/26/25  2202 02/26/25  1639   WBC  --  10.2  --   --  12.1*  --  13.3*  --  15.6*  --  19.4*   HGB  --  11.1*   --   --  11.2*  --  11.4*  --  12.0*  --  14.0   MCV  --  88  --   --  88  --  90  --  88  --  87   PLT  --  312  --   --  281  --  225  --  250  --  263   NA  --  135  --   --  135  --  136  --  136  --  135   POTASSIUM  --  4.7  --   --  4.7  --  3.8  --  4.1  --  3.7   CHLORIDE  --  99  --   --  100  --  99  --  99  --  95*   CO2  --  27  --   --  27  --  26  --  29  --  25   BUN  --  22.5  --   --  20.3  --  10.8  --  16.7  --  18.5   CR  --  1.12  --   --  1.10  --  0.99  --  1.12  --  1.16   ANIONGAP  --  9  --   --  8  --  11  --  8  --  15   RUMA  --  8.5*  --   --  8.6*  --  8.3*  --  8.5*  --  8.9   * 298* 290*   < > 220*   < > 159*   < > 253*   < > 287*   ALBUMIN  --   --   --   --   --   --   --   --  3.0*  --  3.6   PROTTOTAL  --   --   --   --   --   --   --   --  6.0*  --  7.0   BILITOTAL  --   --   --   --   --   --   --   --  0.9  --  1.6*   ALKPHOS  --   --   --   --   --   --   --   --  124  --  145   ALT  --   --   --   --   --   --   --   --  40  --  49   AST  --   --   --   --   --   --   --   --  36  --  52*    < > = values in this interval not displayed.     Lactic Acid   Date Value Ref Range Status   02/26/2025 1.1 0.7 - 2.0 mmol/L Final   01/16/2023 1.8 0.7 - 2.0 mmol/L Final   10/17/2022 1.6 0.7 - 2.0 mmol/L Final     Lactic Acid, Initial   Date Value Ref Range Status   02/26/2025 2.4 (H) 0.7 - 2.0 mmol/L Final       No results found for this or any previous visit (from the past 24 hours).      Hal You MD

## 2025-03-03 NOTE — PLAN OF CARE
Goal Outcome Evaluation:         Pt A&O, pleasant. VSS, weaned to 3L humidified O2. Feeling better today. Ambulating halls with SBA. On IV rocphin and doxy. Good appetite. BGs elevated 303 and 459, sliding scale insulin given.     Face to face report given with opportunity to observe patient.    Report given to SAM Barry RN   3/2/2025  7:13 PM

## 2025-03-04 ENCOUNTER — APPOINTMENT (OUTPATIENT)
Dept: PHYSICAL THERAPY | Facility: HOSPITAL | Age: 75
DRG: 193 | End: 2025-03-04
Payer: COMMERCIAL

## 2025-03-04 LAB
BACTERIA BLD CULT: NO GROWTH
BACTERIA BLD CULT: NO GROWTH
GLUCOSE BLDC GLUCOMTR-MCNC: 164 MG/DL (ref 70–99)
GLUCOSE BLDC GLUCOMTR-MCNC: 194 MG/DL (ref 70–99)
GLUCOSE BLDC GLUCOMTR-MCNC: 372 MG/DL (ref 70–99)
GLUCOSE BLDC GLUCOMTR-MCNC: 383 MG/DL (ref 70–99)
GLUCOSE BLDC GLUCOMTR-MCNC: 388 MG/DL (ref 70–99)
GLUCOSE BLDC GLUCOMTR-MCNC: 418 MG/DL (ref 70–99)
GLUCOSE BLDC GLUCOMTR-MCNC: 419 MG/DL (ref 70–99)
GLUCOSE BLDC GLUCOMTR-MCNC: 462 MG/DL (ref 70–99)
GLUCOSE BLDC GLUCOMTR-MCNC: 472 MG/DL (ref 70–99)

## 2025-03-04 PROCEDURE — 94799 UNLISTED PULMONARY SVC/PX: CPT

## 2025-03-04 PROCEDURE — 999N000157 HC STATISTIC RCP TIME EA 10 MIN

## 2025-03-04 PROCEDURE — 250N000013 HC RX MED GY IP 250 OP 250 PS 637: Performed by: INTERNAL MEDICINE

## 2025-03-04 PROCEDURE — 250N000009 HC RX 250: Performed by: INTERNAL MEDICINE

## 2025-03-04 PROCEDURE — 250N000012 HC RX MED GY IP 250 OP 636 PS 637: Performed by: INTERNAL MEDICINE

## 2025-03-04 PROCEDURE — 120N000001 HC R&B MED SURG/OB

## 2025-03-04 PROCEDURE — 94640 AIRWAY INHALATION TREATMENT: CPT

## 2025-03-04 PROCEDURE — 97530 THERAPEUTIC ACTIVITIES: CPT | Mod: GP

## 2025-03-04 PROCEDURE — 94640 AIRWAY INHALATION TREATMENT: CPT | Mod: 76

## 2025-03-04 PROCEDURE — 250N000011 HC RX IP 250 OP 636: Performed by: INTERNAL MEDICINE

## 2025-03-04 RX ORDER — METFORMIN HYDROCHLORIDE 500 MG/1
500 TABLET, EXTENDED RELEASE ORAL 2 TIMES DAILY WITH MEALS
Status: DISCONTINUED | OUTPATIENT
Start: 2025-03-04 | End: 2025-03-05 | Stop reason: HOSPADM

## 2025-03-04 RX ADMIN — PREDNISONE 40 MG: 20 TABLET ORAL at 08:38

## 2025-03-04 RX ADMIN — INSULIN ASPART 1 UNITS: 100 INJECTION, SOLUTION INTRAVENOUS; SUBCUTANEOUS at 08:37

## 2025-03-04 RX ADMIN — METOPROLOL SUCCINATE 50 MG: 50 TABLET, EXTENDED RELEASE ORAL at 08:39

## 2025-03-04 RX ADMIN — AMLODIPINE BESYLATE 10 MG: 5 TABLET ORAL at 08:38

## 2025-03-04 RX ADMIN — INSULIN ASPART 6 UNITS: 100 INJECTION, SOLUTION INTRAVENOUS; SUBCUTANEOUS at 12:34

## 2025-03-04 RX ADMIN — CEFTRIAXONE SODIUM 2 G: 2 INJECTION, SOLUTION INTRAVENOUS at 16:28

## 2025-03-04 RX ADMIN — INSULIN GLARGINE 10 UNITS: 100 INJECTION, SOLUTION SUBCUTANEOUS at 22:11

## 2025-03-04 RX ADMIN — IPRATROPIUM BROMIDE AND ALBUTEROL SULFATE 3 ML: .5; 3 SOLUTION RESPIRATORY (INHALATION) at 15:21

## 2025-03-04 RX ADMIN — IPRATROPIUM BROMIDE AND ALBUTEROL SULFATE 3 ML: .5; 3 SOLUTION RESPIRATORY (INHALATION) at 07:30

## 2025-03-04 RX ADMIN — DOXYCYCLINE 100 MG: 100 INJECTION, POWDER, LYOPHILIZED, FOR SOLUTION INTRAVENOUS at 05:59

## 2025-03-04 RX ADMIN — INSULIN GLARGINE 10 UNITS: 100 INJECTION, SOLUTION SUBCUTANEOUS at 00:57

## 2025-03-04 RX ADMIN — METFORMIN ER 500 MG 500 MG: 500 TABLET ORAL at 18:14

## 2025-03-04 RX ADMIN — INSULIN ASPART 5 UNITS: 100 INJECTION, SOLUTION INTRAVENOUS; SUBCUTANEOUS at 18:14

## 2025-03-04 RX ADMIN — ASPIRIN 81 MG CHEWABLE TABLET 81 MG: 81 TABLET CHEWABLE at 08:47

## 2025-03-04 RX ADMIN — ATORVASTATIN CALCIUM 20 MG: 20 TABLET, FILM COATED ORAL at 18:14

## 2025-03-04 RX ADMIN — DOXYCYCLINE 100 MG: 100 INJECTION, POWDER, LYOPHILIZED, FOR SOLUTION INTRAVENOUS at 18:14

## 2025-03-04 RX ADMIN — IPRATROPIUM BROMIDE AND ALBUTEROL SULFATE 3 ML: .5; 3 SOLUTION RESPIRATORY (INHALATION) at 19:48

## 2025-03-04 RX ADMIN — IPRATROPIUM BROMIDE AND ALBUTEROL SULFATE 3 ML: .5; 3 SOLUTION RESPIRATORY (INHALATION) at 11:15

## 2025-03-04 ASSESSMENT — ACTIVITIES OF DAILY LIVING (ADL)
ADLS_ACUITY_SCORE: 30

## 2025-03-04 NOTE — PROGRESS NOTES
[facility]    Hospitalist Progress Note    Date of Service (when I saw the patient): 03/04/2025    Assessment & Plan   Jamie Chu is a 74 year old male who was admitted on 2/26/2025.    Acute hypoxic respiratory failure: Secondary to community-acquired pneumonia, diagnosed by CT with bibasilar infiltrates.  Patient also has history of COPD, not oxygen dependent at home.  No significant wheezing at bedside.  Denies any history of dysphagia, aspiration.  WBC is trending down currently at 15.6.  Patient is an ex-smoker.  -2/28: Patient feels more short of breath today, requiring up to 10 L high flow nasal cannula.  Lung sound clear.  Continuing scheduled DuoNebs, empiric ceftriaxone and doxycycline.  Continue aggressive pulmonary toilet with Acapella valve, incentive spirometer, ambulation as tolerated.  -3/1: Patient doing about the same.  Patient is on 8 L high flow nasal cannula.  Lungs sound clear.  Continue scheduled DuoNebs, ceftriaxone, doxycycline.  Continue pulmonary toilet.  Added prednisone despite no wheezing.  -3/2: Patient improving.  Down to 4 L nasal cannula.  Added prednisone yesterday, seems to be effective.  Continuing empiric DuoNebs, doxycycline, ceftriaxone.  Weaning supplemental oxygen as able.  Continue working with PT on mobility.  -3/3: Patient improving yet.  Down to 2 L nasal cannula.  Continuing prednisone, DuoNebs, doxycycline, ceftriaxone.  Doing well with PT with mobility.  Anticipate discharge in next day or so.  -3/4: Patient down to 1 L nasal cannula.  Continuing prednisone, DuoNebs, doxycycline, ceftriaxone.  Anticipate discharge tomorrow with no supplemental oxygen, no further antibiotic treatment.    CVS: Patient has history of coronary disease, chronic diastolic CHF, hypertension.  EKG negative, troponins flat.  -Continue home medications.    Non-insulin-dependent diabetes mellitus type 2: Blood sugars are in the 200s here  -Okay to continue metformin  -Continue  insulin sliding scale    FEN: Oral diet as tolerated    Clinically Significant Risk Factors               # Hypoalbuminemia: Lowest albumin = 3 g/dL at 2/27/2025  5:36 AM, will monitor as appropriate     # Hypertension: Noted on problem list                     DVT Prophylaxis: Pneumatic Compression Devices    Code Status: Special Code    Disposition: Expected discharge tomorrow      Hal You MD, MD    Interval History   Patient seen in room.  Patient states he feels significantly better today.  He is down to 1L nasal cannula.  Cough improving.  No fevers.    -Data reviewed today: I reviewed all new labs and imaging results over the last 24 hours. I personally reviewed imaging reports.    Physical Exam   Temp: 97  F (36.1  C) Temp src: Tympanic BP: 111/47 Pulse: 68   Resp: 17 SpO2: 92 % O2 Device: Nasal cannula Oxygen Delivery: 1 LPM  Vitals:    02/26/25 1712 02/26/25 2130   Weight: 74.8 kg (164 lb 14.5 oz) 70.4 kg (155 lb 3.3 oz)     Vital Signs with Ranges  Temp:  [97  F (36.1  C)-98.4  F (36.9  C)] 97  F (36.1  C)  Pulse:  [61-68] 68  Resp:  [16-18] 17  BP: (105-130)/(47-61) 111/47  SpO2:  [86 %-94 %] 92 %        Intake/Output Summary (Last 24 hours) at 3/4/2025 0916  Last data filed at 3/3/2025 1915  Gross per 24 hour   Intake 480 ml   Output --   Net 480 ml         Peripheral IV Right Lower forearm (Active)   Site Assessment WDL 03/04/25 0852   Line Status Saline locked 03/04/25 0852   Dressing Transparent 03/04/25 0852   Dressing Status clean;dry;intact 03/04/25 0852   Line Intervention Flushed 03/04/25 0852   Phlebitis Scale 0-->no symptoms 03/04/25 0852   Infiltration? no 03/04/25 0852   Number of days:      No line/device    Constitutional - AA, NAD  HEENT - atraumatic, normocephalic  Neck - supple, no masses, no JVD  CVS - S1 S2 RRR, no murmurs, rubs, gallops  Respiratory - diminished breath sounds bilaterally but clear, no wheezes  GI - soft, NT, ND, + bowel sounds, no organomegaly  Musculoskeletal  - no LE edema, no lesions  Neuro - oriented x 3, no gross focal deficits      Medications   Current Facility-Administered Medications   Medication Dose Route Frequency Provider Last Rate Last Admin     Current Facility-Administered Medications   Medication Dose Route Frequency Provider Last Rate Last Admin    amLODIPine (NORVASC) tablet 10 mg  10 mg Oral Mora Griffin MD   10 mg at 03/04/25 0838    aspirin (ASA) chewable tablet 81 mg  81 mg Oral Mora Griffin MD   81 mg at 03/04/25 0847    atorvastatin (LIPITOR) tablet 20 mg  20 mg Oral Daily with supper Mora Smith MD   20 mg at 03/03/25 1747    cefTRIAXone IN D5W (ROCEPHIN) intermittent infusion 2 g  2 g Intravenous Q24H Mora Smith  mL/hr at 03/03/25 1624 2 g at 03/03/25 1624    doxycycline (VIBRAMYCIN) 100 mg vial to attach to  mL bag  100 mg Intravenous Q12H Mora Smith MD   100 mg at 03/04/25 0559    insulin aspart (NovoLOG) injection (RAPID ACTING)  1-7 Units Subcutaneous TID AC Mora Smith MD   1 Units at 03/04/25 0837    insulin aspart (NovoLOG) injection (RAPID ACTING)  1-5 Units Subcutaneous At Bedtime Mora Smith MD   5 Units at 03/03/25 2257    insulin glargine (LANTUS PEN) injection 10 Units  10 Units Subcutaneous At Bedtime Davy Castillo MD   10 Units at 03/04/25 0057    ipratropium - albuterol 0.5 mg/2.5 mg/3 mL (DUONEB) neb solution 3 mL  3 mL Nebulization 4x daily Mora Smith MD   3 mL at 03/04/25 0730    metoprolol succinate ER (TOPROL XL) 24 hr tablet 50 mg  50 mg Oral Mora Griffin MD   50 mg at 03/04/25 0839    predniSONE (DELTASONE) tablet 40 mg  40 mg Oral Daily Hal You MD   40 mg at 03/04/25 0838    sodium chloride (PF) 0.9% PF flush 3 mL  3 mL Intracatheter Q8H Jaskaran Alaniz MD   3 mL at 03/04/25 0021    sodium chloride (PF) 0.9% PF flush 3 mL  3 mL Intracatheter Q8H Mora Smith MD   3 mL at 03/04/25 0838       Data   Recent Labs   Lab  03/04/25  0747 03/04/25  0238 03/04/25  0055 03/03/25  0830 03/03/25  0540 03/02/25  0823 03/02/25  0446 03/01/25  0814 03/01/25  0753 02/27/25  0931 02/27/25  0536 02/26/25  2202 02/26/25  1639   WBC  --   --   --   --  10.2  --  12.1*  --  13.3*  --  15.6*  --  19.4*   HGB  --   --   --   --  11.1*  --  11.2*  --  11.4*  --  12.0*  --  14.0   MCV  --   --   --   --  88  --  88  --  90  --  88  --  87   PLT  --   --   --   --  312  --  281  --  225  --  250  --  263   NA  --   --   --   --  135  --  135  --  136  --  136  --  135   POTASSIUM  --   --   --   --  4.7  --  4.7  --  3.8  --  4.1  --  3.7   CHLORIDE  --   --   --   --  99  --  100  --  99  --  99  --  95*   CO2  --   --   --   --  27  --  27  --  26  --  29  --  25   BUN  --   --   --   --  22.5  --  20.3  --  10.8  --  16.7  --  18.5   CR  --   --   --   --  1.12  --  1.10  --  0.99  --  1.12  --  1.16   ANIONGAP  --   --   --   --  9  --  8  --  11  --  8  --  15   RUMA  --   --   --   --  8.5*  --  8.6*  --  8.3*  --  8.5*  --  8.9   * 194* 372*   < > 298*   < > 220*   < > 159*   < > 253*   < > 287*   ALBUMIN  --   --   --   --   --   --   --   --   --   --  3.0*  --  3.6   PROTTOTAL  --   --   --   --   --   --   --   --   --   --  6.0*  --  7.0   BILITOTAL  --   --   --   --   --   --   --   --   --   --  0.9  --  1.6*   ALKPHOS  --   --   --   --   --   --   --   --   --   --  124  --  145   ALT  --   --   --   --   --   --   --   --   --   --  40  --  49   AST  --   --   --   --   --   --   --   --   --   --  36  --  52*    < > = values in this interval not displayed.     Lactic Acid   Date Value Ref Range Status   02/26/2025 1.1 0.7 - 2.0 mmol/L Final   01/16/2023 1.8 0.7 - 2.0 mmol/L Final   10/17/2022 1.6 0.7 - 2.0 mmol/L Final     Lactic Acid, Initial   Date Value Ref Range Status   02/26/2025 2.4 (H) 0.7 - 2.0 mmol/L Final       No results found for this or any previous visit (from the past 24 hours).      Hal You MD

## 2025-03-04 NOTE — PLAN OF CARE
Plan of Care Reviewed With: Patient    Overall Patient Progress: Progressing         Pt is alert and oriented, VS and assessment as charted, afebrile, denies pain. On 2L NC. IV saline locked. Infrequent productive cough. Ambulating in hallway. Able to make needs known, call light within reach.     Face to face report given with opportunity to observe patient.    Report given to SAM Ulrich RN   3/3/2025  11:30 PM

## 2025-03-04 NOTE — PROVIDER NOTIFICATION
Provider notified about blood glucose of 472 after lunch. 6 units of novolog were given for blood glucose of 418 before lunch.

## 2025-03-04 NOTE — PHARMACY-MEDICATION REGIMEN REVIEW
Pharmacy Antimicrobial Stewardship Assessment     Current Antimicrobial Therapy:  Anti-infectives (From now, onward)      Start     Dose/Rate Route Frequency Ordered Stop    25 1600  cefTRIAXone IN D5W (ROCEPHIN) intermittent infusion 2 g         2 g  100 mL/hr over 30 Minutes Intravenous EVERY 24 HOURS 25 2356      25 0600  doxycycline (VIBRAMYCIN) 100 mg vial to attach to  mL bag         100 mg  over 1-2 Hours Intravenous EVERY 12 HOURS 25            Indication: CAP    Days of Therapy: 7     Pertinent Labs:    Recent Labs   Lab Test 25  0540 25  0446 25  0753 25  0536 25  1639   WBC 10.2 12.1* 13.3* 15.6* 19.4*       Recent Labs   Lab Test 25  0446 25  0753 25  1856 25  1639   LACT  --   --  1.1 2.4*   CRPI 160.34* 176.96*  --  262.03*   PCAL  --   --   --  0.43      Temperature:  Temp (24hrs), Av.7  F (36.5  C), Min:97  F (36.1  C), Max:98.4  F (36.9  C)      Culture Results:   30-Day Micro Results       Collected Updated Procedure Result Status      2025 1714 2025 1757 Influenza A/B, RSV and SARS-CoV2 PCR (COVID-19) Nose [[id number]]    Swab from Nose    Final result Component Value   Influenza A PCR Negative   Influenza B PCR Negative   RSV PCR Negative   SARS CoV2 PCR Negative   NEGATIVE: SARS-CoV-2 (COVID-19) RNA not detected, presumed negative.            2025 1700 2025 1329 Blood Culture Wrist, Right [[id number]]   Blood from Wrist, Right    Final result Component Value   Culture No Growth               2025 1639 2025 1329 Blood Culture Peripheral Blood [[id number]]   Peripheral Blood    Final result Component Value   Culture No Growth                      Recommendations/Interventions:  1. Could consider stopping antibiotic treatment as blood cultures finalized with no growth, patient is afebrile and had 7 days of appropriate antibiotics.     Monica Fox, [facility]  ,  2025

## 2025-03-04 NOTE — PLAN OF CARE
Goal Outcome Evaluation:     Face to face report given with opportunity to observe patient.    Report given to Janeen Rodriguez   3/4/2025  2:14 PM    Patient was at room air when I arrived, oxygen sat was at 88. Respiratory therapy put 1 LPM of oxygen via nasal cannula. Was afebrile during time of care. Lungs clear throughout infrequent productive cough. Blood sugar before lunch was 418 and 6 units of insulin was administered. Ambulating to bathroom independently, call light within reach, makes needs known.    Jasmin Rodriguez, Student Nurse

## 2025-03-04 NOTE — PLAN OF CARE
Goal Outcome Evaluation:       Plan of Care Reviewed With: patient    Overall Patient Progress: progressing    Pt A&O, independent in room. VSS, afebrile. Denied pain. On 1L O2, O2 sats low to mid 90's. NETTLES. LS diminished. Infrequent congested cough. HRR. BS active. Denied nausea. BG as charted- additional 10 units of novolog given this afternoon for  (see provider notification note). Patient's home metformin restarted this evening. IV SL. IV Rocephin and Doxy continue. Walked in halls with staff x3 today. Call light within reach.     Face to face report given with opportunity to observe patient.    Report given to SAM Morales RN   3/4/2025  7:20 PM

## 2025-03-04 NOTE — CARE PLAN
Patient alert and oriented, remains on 2L NC. Lung sounds clear/diminsihed. Blood sugar elevated recheck after sliding scale was given was 383. Provider notified and ordered an additional 5 units along with 10 units of lantus. Rest of assessments as charted.     Face to face report given with opportunity to observe patient.    Report given to Janeen Lindsey RN   3/4/2025  3:05 AM

## 2025-03-04 NOTE — PROGRESS NOTES
03/04/25 1231   Appointment Info   Signing Clinician's Name / Credentials (PT) ANGUS Ramírez   Student Supervision Direct supervision provided;Direct Patient Contact Provided;Therapy services provided with the co-signing licensed therapist guiding and directing the services, and providing the skilled judgement and assessment throughout the session   Interventions   Interventions Quick Adds Therapeutic Activity   Therapeutic Activity   Therapeutic Activities: dynamic activities to improve functional performance Minutes (80235) 15   Symptoms Noted During/After Treatment Fatigue;Shortness of breath   Treatment Detail/Skilled Intervention Pt supine in bed upon PT arrival with resting O2 sats at 88-89% on 1 L O2. Indep with supine to sitting edge of bed. Sit to stand x 2 bouts throughout session indep. Ambulated initial bout of 200' w/ SBA and pushing the wheelchair as pt preferred to walk that way. Seated break with O2 sats 85-86%. Pt does not note notable SOB. He reports feeling slightly more stable with the wheelchair, but does not need the chair in order to ambulate steadily. Discussed using a walker or cane once discharged home if pt has moments of unsteadiness or feels more fatigued. Ambulated additional 300' pushing the chair with SBA. O2 still around 85-86% after walking. Left pt seated in chair with call light in easy reach.   PT Discharge Planning   PT Plan Progress activity tolerance   PT Discharge Recommendation (DC Rec) home   PT Rationale for DC Rec Pt is safe to return home once medically deemed appropriate. He feels confident being able to return to his home and does not need to access stairs.   PT Brief overview of current status Supine to sit and sit to stand transfers indep. Amb x 500' total with SBA and pushing wheelchair, O2 sats dropping to 85% at the lowest.   PT Total Distance Amb During Session (feet) 500   Physical Therapy Time and Intention   Timed Code Treatment Minutes 15   Total  Session Time (sum of timed and untimed services) 15

## 2025-03-05 ENCOUNTER — TELEPHONE (OUTPATIENT)
Dept: FAMILY MEDICINE | Facility: OTHER | Age: 75
End: 2025-03-05

## 2025-03-05 VITALS
HEART RATE: 87 BPM | TEMPERATURE: 98 F | SYSTOLIC BLOOD PRESSURE: 110 MMHG | RESPIRATION RATE: 18 BRPM | HEIGHT: 72 IN | BODY MASS INDEX: 21.02 KG/M2 | WEIGHT: 155.2 LBS | DIASTOLIC BLOOD PRESSURE: 57 MMHG | OXYGEN SATURATION: 93 %

## 2025-03-05 LAB
GLUCOSE BLDC GLUCOMTR-MCNC: 201 MG/DL (ref 70–99)
GLUCOSE BLDC GLUCOMTR-MCNC: 321 MG/DL (ref 70–99)
GLUCOSE BLDC GLUCOMTR-MCNC: 342 MG/DL (ref 70–99)

## 2025-03-05 PROCEDURE — 250N000013 HC RX MED GY IP 250 OP 250 PS 637: Performed by: INTERNAL MEDICINE

## 2025-03-05 PROCEDURE — 250N000011 HC RX IP 250 OP 636: Performed by: INTERNAL MEDICINE

## 2025-03-05 PROCEDURE — 94640 AIRWAY INHALATION TREATMENT: CPT | Mod: 76

## 2025-03-05 PROCEDURE — 94640 AIRWAY INHALATION TREATMENT: CPT

## 2025-03-05 PROCEDURE — 250N000009 HC RX 250: Performed by: INTERNAL MEDICINE

## 2025-03-05 PROCEDURE — 999N000157 HC STATISTIC RCP TIME EA 10 MIN

## 2025-03-05 PROCEDURE — 250N000012 HC RX MED GY IP 250 OP 636 PS 637: Performed by: INTERNAL MEDICINE

## 2025-03-05 RX ORDER — PREDNISONE 20 MG/1
20 TABLET ORAL DAILY
Qty: 3 TABLET | Refills: 0 | Status: SHIPPED | OUTPATIENT
Start: 2025-03-05 | End: 2025-03-08

## 2025-03-05 RX ADMIN — INSULIN ASPART 2 UNITS: 100 INJECTION, SOLUTION INTRAVENOUS; SUBCUTANEOUS at 08:52

## 2025-03-05 RX ADMIN — AMLODIPINE BESYLATE 10 MG: 5 TABLET ORAL at 08:55

## 2025-03-05 RX ADMIN — METOPROLOL SUCCINATE 50 MG: 50 TABLET, EXTENDED RELEASE ORAL at 08:55

## 2025-03-05 RX ADMIN — METFORMIN ER 500 MG 500 MG: 500 TABLET ORAL at 08:55

## 2025-03-05 RX ADMIN — DOXYCYCLINE 100 MG: 100 INJECTION, POWDER, LYOPHILIZED, FOR SOLUTION INTRAVENOUS at 05:09

## 2025-03-05 RX ADMIN — ASPIRIN 81 MG CHEWABLE TABLET 81 MG: 81 TABLET CHEWABLE at 08:56

## 2025-03-05 RX ADMIN — IPRATROPIUM BROMIDE AND ALBUTEROL SULFATE 3 ML: .5; 3 SOLUTION RESPIRATORY (INHALATION) at 07:42

## 2025-03-05 RX ADMIN — INSULIN ASPART 5 UNITS: 100 INJECTION, SOLUTION INTRAVENOUS; SUBCUTANEOUS at 12:39

## 2025-03-05 RX ADMIN — IPRATROPIUM BROMIDE AND ALBUTEROL SULFATE 3 ML: .5; 3 SOLUTION RESPIRATORY (INHALATION) at 11:23

## 2025-03-05 RX ADMIN — PREDNISONE 40 MG: 20 TABLET ORAL at 08:55

## 2025-03-05 ASSESSMENT — ACTIVITIES OF DAILY LIVING (ADL)
ADLS_ACUITY_SCORE: 30

## 2025-03-05 NOTE — PLAN OF CARE
Goal Outcome Evaluation:     Patient was afebrile during time of care. Respiratory therapy had him on one leader of oxygen and then changed to room air. Oxygen was put back on to 1 LPM. Blood sugar before lunch was 342 and 5 units of insulin was administered. Ambulating to bathroom independently, call light within reach, and makes needs known. Patient is to be discharged sometime today.   Face to face report given with opportunity to observe patient.    Report given to Caro Rodriguez   3/5/2025  12:53 PM

## 2025-03-05 NOTE — PROVIDER NOTIFICATION
HS blood glucose 462 mg/dl. Novolog 5 units subcutaneous given (as per sliding scale) and scheduled HS Lantus 10 units subcutaneous. Blood glucose recheck one hour later is 419 mg/dl. On-call provider notified. MD acknowledged. No new orders at this time.

## 2025-03-05 NOTE — PLAN OF CARE
Plan of Care Reviewed With: Patient    Overall Patient Progress: Progressing    VS and assessments as charted. Alert, oriented and able to make needs known. Denies pain. Receiving IV Ceftriaxone and Doxycycline. Lung sounds diminished. O2 at 1 LPM via NC. Occasional productive cough; cream colored sputum. HRR. -[phone] this shift (see provider notification note). Voiding without difficulty. Up independently in room. Free from falls and/or injury this shift. Call light within reach. Bed locked and low.     Face to face report given with opportunity to observe patient.    Report given to SAM Elizondo.    Etelvina Klein RN   3/5/2025  7:48 AM

## 2025-03-05 NOTE — PROGRESS NOTES
Patient has been assessed for Home Oxygen needs. Oxygen readings:    *Pulse oximetry (SpO2) = 86% on room air at rest while awake.    *SpO2 improved to 92% on 1liters/minute at rest.    *SpO2 = 84% on room air during activity/with exercise.    *SpO2 improved to 90% on 3liters/minute during activity/with exercise.

## 2025-03-05 NOTE — DISCHARGE SUMMARY
[facility]    Discharge Summary  Hospitalist    Date of Admission:  2/26/2025  Date of Discharge:  3/5/2025  Discharging Provider: Hal You MD, MD  Date of Service (when I saw the patient): 03/05/25    Discharge Diagnoses   Active Problems:    Hypoxia    COPD exacerbation (H)    Pneumonia of both lower lobes due to infectious organism      History of Present Illness   Jamie Chu is an 74 year old male who presented with shortness of breath.  Please see admission H+P for additional details.    Hospital Course   Jamie Chu was admitted on 2/26/2025.  74-year-old male with a history of COPD, not oxygen dependent at home, non-insulin-dependent diabetes, coronary disease with history of chronic diastolic CHF, hypertension who presented with shortness of breath.  CT scan of the chest diagnosed bibasilar pneumonia.  Patient was requiring up to 10 L high flow nasal cannula.  He was treated with scheduled DuoNebs, empiric ceftriaxone and doxycycline, aggressive pulmonary toilet.  He was somewhat slow to improve, but slowly but surely he did.  Cultures, viral swabs were all negative.  Patient completed 7 days of antibiotic therapy as an inpatient here.  Patient also completed 5 days prednisone burst, will discharge him on a short taper.  Supplemental oxygen was not able to be tapered completely, he qualifies for 3 L.  Physical and Occupational Therapy assessments deemed him safe to return home.  Will have him follow-up with his primary care physician within the week to assess for continued wellbeing.    Hal You MD      Significant Results and Procedures   See below    Pending Results   These results will be followed up by Jaime Hebert    Unresulted Labs Ordered in the Past 30 Days of this Admission       No orders found from 1/27/2025 to 2/27/2025.            Code Status   DNR       Primary Care Physician   Jaime Hebert    Physical Exam   Temp: 97.1  F (36.2  C) Temp src: Tympanic BP:  132/67 Pulse: 90   Resp: 17 SpO2: (!) 89 % O2 Device: None (Room air) Oxygen Delivery: 1 LPM  Vitals:    02/26/25 1712 02/26/25 2130   Weight: 74.8 kg (164 lb 14.5 oz) 70.4 kg (155 lb 3.3 oz)     Vital Signs with Ranges  Temp:  [97.1  F (36.2  C)-98.1  F (36.7  C)] 97.1  F (36.2  C)  Pulse:  [72-90] 90  Resp:  [16-18] 17  BP: (119-132)/(52-67) 132/67  SpO2:  [87 %-92 %] 89 %  I/O last 3 completed shifts:  In: 1300 [P.O.:1300]  Out: -     Constitutional - AA, NAD  HEENT - atraumatic, normocephalic  Neck - supple, no masses, no JVD  CVS - S1 S2 RRR, no murmurs, rubs, gallops  Respiratory - diminished breath sounds bilaterally but clear, no wheezes  GI - soft, NT, ND, + bowel sounds, no organomegaly  Musculoskeletal - no LE edema, no lesions  Neuro - oriented x 3, no gross focal deficits      Discharge Disposition   Discharged to home  Condition at discharge: Stable    Consultations This Hospital Stay   SPEECH LANGUAGE PATH ADULT IP CONSULT  PHYSICAL THERAPY ADULT IP CONSULT    Time Spent on this Encounter   IHal MD, personally saw the patient today and spent greater than 30 minutes discharging this patient.    Discharge Orders      Reason for your hospital stay    CAP, COPD exacerbation     Follow-up and recommended labs and tests     Follow up with primary care provider, Jaime Hebert, within 7 days for hospital follow- up.  No follow up labs or test are needed.     Activity    Your activity upon discharge: activity as tolerated     Oxygen Adult/Peds    Oxygen Documentation  I certify that this patient, Jamie Chu has been under my care (or a nurse practitioner or physican's assistant working with me). This is the face-to-face encounter for oxygen medical necessity.      At the time of this encounter, I have reviewed the qualifying testing and have determined that supplemental oxygen is reasonable and necessary and is expected to improve the patient's condition in a home setting.         Patient has  continued oxygen desaturation due to COPD J44.9.    If portability is ordered, is the patient mobile within the home? yes    Was this visit performed as a telehealth visit: No     Diet    Follow this diet upon discharge: Current Diet:Orders Placed This Encounter      Regular Diet Adult     Discharge Medications   Current Discharge Medication List        START taking these medications    Details   predniSONE (DELTASONE) 20 MG tablet Take 1 tablet (20 mg) by mouth daily for 3 days.  Qty: 3 tablet, Refills: 0    Associated Diagnoses: COPD exacerbation (H); Pneumonia of both lower lobes due to infectious organism           CONTINUE these medications which have NOT CHANGED    Details   amLODIPine (NORVASC) 10 MG tablet Take 10 mg by mouth every morning.      aspirin (ASA) 325 MG tablet Take 975 mg by mouth daily as needed for fever or pain.      aspirin (ASA) 81 MG chewable tablet Take 81 mg by mouth every morning.      atorvastatin (LIPITOR) 20 MG tablet Take 20 mg by mouth daily (with dinner).      ipratropium - albuterol 0.5 mg/2.5 mg/3 mL (DUONEB) 0.5-2.5 (3) MG/3ML neb solution Take 1 vial by nebulization every 6 hours as needed for shortness of breath, wheezing or cough.      metFORMIN (GLUCOPHAGE XR) 500 MG 24 hr tablet Take 500 mg by mouth 2 times daily (with meals).      metoprolol succinate ER (TOPROL XL) 50 MG 24 hr tablet Take 50 mg by mouth every morning.      nitroGLYcerin (NITROSTAT) 0.4 MG sublingual tablet Place 0.4 mg under the tongue every 5 minutes as needed for chest pain.  Do not crush; maximum of 3 doses in 15 minutes.      betamethasone dipropionate (DIPROSONE) 0.05 % external ointment Apply topically 2 times daily Use on hands. Do not use for more than 14 days in a row.  Qty: 50 g, Refills: 0    Associated Diagnoses: Psoriasis      mometasone (ELOCON) 0.1 % external cream Apply once a day for psoriasis  Qty: 45 g, Refills: 3    Associated Diagnoses: Psoriasis vulgaris      mometasone (ELOCON) 0.1  % external ointment Apply topically daily.  Qty: 15 g, Refills: 0    Associated Diagnoses: Psoriasis      triamcinolone (KENALOG) 0.1 % external cream Apply once or twice a day for psoriasis  Qty: 454 g, Refills: 3    Associated Diagnoses: Psoriasis vulgaris           Allergies   No Known Allergies  Data   Most Recent 3 CBC's:  Recent Labs   Lab Test 03/03/25  0540 03/02/25  0446 03/01/25  0753   WBC 10.2 12.1* 13.3*   HGB 11.1* 11.2* 11.4*   MCV 88 88 90    281 225      Most Recent 3 BMP's:  Recent Labs   Lab Test 03/05/25  0830 03/05/25  0218 03/04/25  2307 03/03/25  0830 03/03/25  0540 03/02/25  0823 03/02/25  0446 03/01/25  0814 03/01/25  0753   NA  --   --   --   --  135  --  135  --  136   POTASSIUM  --   --   --   --  4.7  --  4.7  --  3.8   CHLORIDE  --   --   --   --  99  --  100  --  99   CO2  --   --   --   --  27  --  27  --  26   BUN  --   --   --   --  22.5  --  20.3  --  10.8   CR  --   --   --   --  1.12  --  1.10  --  0.99   ANIONGAP  --   --   --   --  9  --  8  --  11   RUMA  --   --   --   --  8.5*  --  8.6*  --  8.3*   * 321* 419*   < > 298*   < > 220*   < > 159*    < > = values in this interval not displayed.     Most Recent 2 LFT's:  Recent Labs   Lab Test 02/27/25  0536 02/26/25  1639   AST 36 52*   ALT 40 49   ALKPHOS 124 145   BILITOTAL 0.9 1.6*     Most Recent INR's and Anticoagulation Dosing History:  Anticoagulation Dose History           No data to display              Most Recent 3 Troponin's:No lab results found.  Most Recent Cholesterol Panel:  Recent Labs   Lab Test 01/08/25  1300 06/14/24  1002   CHOL  --  113   LDL  --  46   HDL  --  47   TRIG 104 98     Most Recent 6 Bacteria Isolates From Any Culture (See EPIC Reports for Culture Details):No lab results found.  Most Recent TSH, T4 and A1c Labs:  Recent Labs   Lab Test 06/14/24  1002   TSH 1.22   A1C 6.7*     Results for orders placed or performed during the hospital encounter of 02/26/25   CT Chest Pulmonary  Embolism w Contrast    Narrative    EXAM: CT CHEST PULMONARY EMBOLISM W CONTRAST  LOCATION: [facility]  DATE: 2/26/2025    INDICATION: 4 days of productive cough, copd, fever hypoxia ro pneumonia and PE  COMPARISON: 6/18/2024  TECHNIQUE: CT chest pulmonary angiogram during arterial phase injection of IV contrast. Multiplanar reformats and MIP reconstructions were performed. Dose reduction techniques were used.   CONTRAST: [id number]   58ML    FINDINGS:  ANGIOGRAM CHEST: Pulmonary arteries are normal caliber and negative for pulmonary emboli. Thoracic aorta is negative for dissection. No CT evidence of right heart strain.    LUNGS AND PLEURA: Patchy consolidation and nodular opacities within both lower lobes with corresponding bronchial wall thickening and endobronchial opacities. Tree-in-bud opacities also present in the lingula. No pleural effusion or pneumothorax.   Moderate upper lung predominant centrilobular emphysema.    MEDIASTINUM/AXILLAE: Normal.    CORONARY ARTERY CALCIFICATION: Moderate.    UPPER ABDOMEN: Normal.    MUSCULOSKELETAL: Surgical hardware in the right clavicle. Degenerative changes in the spine.      Impression    IMPRESSION:  1.  No pulmonary embolism.  2.  Bilateral lower lobe pneumonia with associated endobronchial opacities and bronchial wall thickening, which suggests aspiration or retained secretions.

## 2025-03-05 NOTE — TELEPHONE ENCOUNTER
1:06 PM    Reason for Call: OVERBOOK    Patient is having the following symptoms: [facility] hospital staff called and states patient needs to be seen for hospital follow up within 7 days of discharge. Discharging today. Was hospitalized at [facility] for hypoxia, COPD exacerbation, pneumonia. Being sent home on oxygen. Patient was scheduled for 03/17/2025 (earliest appointment) but staff requested a message be sent back to Care Team to request earlier appointment.     The patient is requesting an appointment for within 7 days with Dr. Hebert.    Was an appointment offered for this call? No    Preferred method for responding to this message: Telephone Call  What is your phone number ?[phone]    If we cannot reach you directly, may we leave a detailed response at the number you provided? Yes    Can this message wait until your PCP/provider returns, if unavailable today? Not applicable    Concha Crowder

## 2025-03-05 NOTE — PLAN OF CARE
Goal Outcome Evaluation:      Plan of Care Reviewed With: patient    Overall Patient Progress: improving    Patient discharged at 3:15 PM via wheel chair accompanied by staff. Prescriptions sent to patients preferred pharmacy. All belongings sent with patient.     Discharge instructions reviewed with patient.     Patient discharged to home.   Report called to N/A    Surgical Patient   Surgical Procedures during stay: N/A  Did patient receive discharge instruction on wound care and recognition of infection symptoms? N/A    MISC  Follow up appointment made:  Yes  Home medications returned to patient: N/A  Patient reports pain was well managed at discharge: Yes

## 2025-03-05 NOTE — DISCHARGE INSTRUCTIONS
"ED/Hospital Follow Up Appointment Monday 3/17/25 @ 1:45 PM with Jaime Hebert    [facility] [place]  [address] [place] [address]  [phone]    **Please plan to arrive at the clinic at your \"Arrive By\" time for your appointment. Our late policy will be enforced based on this time.**      "

## 2025-03-05 NOTE — PLAN OF CARE
Physical Therapy Discharge Summary    Reason for therapy discharge:    Discharged to home.    Progress towards therapy goal(s). See goals on Care Plan in [facility] electronic health record for goal details.  Goals partially met.  Barriers to achieving goals:   discharge from facility.    Therapy recommendation(s):    No further therapy is recommended.    Goal Outcome Evaluation:

## 2025-03-06 ENCOUNTER — PATIENT OUTREACH (OUTPATIENT)
Dept: CARE COORDINATION | Facility: OTHER | Age: 75
End: 2025-03-06

## 2025-03-06 NOTE — PROGRESS NOTES
Clinic Care Coordination Contact  Care Team Conversations    RN CC attempted to call patient to complete TCM. No answer. Attempt again on a later date.     Regan ELLIS RN, Care Coordinator  [facility]

## 2025-03-07 NOTE — PROGRESS NOTES
Assessment & Plan     Hospital discharge follow-up  COPD exacerbation (H)  Pneumonia of both lower lobes due to infectious organism  Recent hospitalization for severe COPD exacerbation with bilateral lower lobe pneumonia.  Completed antibiotics and prolonged steroid taper.  Off of supplemental oxygen at home after a couple days postdischarge, diligent SpO2 monitoring frequently in the low to mid 90%.  Feels back to baseline.  Normally does pretty well, but decompensates hard when he does get respiratory illness.  - CBC with platelets and differential  - Basic metabolic panel  - Reassuring today but may benefit from PFTs, has never been on maintenance regimen  - Does have as needed DuoNebs    Type 2 diabetes mellitus without complication, without long-term current use of insulin (H)  Last A1c at goal 6.7%, will update today.  - Hemoglobin A1c  - ASA/statin  - Metformin 500 mg twice daily  - Recommend annual eye exam  - Non-smoker    Essential (primary) hypertension  Normotensive today.  Continue current regimen.  - Basic metabolic panel  - Amlodipine 10 mg  - Metoprolol ER 50 mg daily    Coronary artery disease involving native coronary artery of native heart without angina pectoris  Stable.  No recent symptom change.  Reviewed recent visit with cardiology 1/8/2025.  - ASA  - Lipitor 20 mg  - Metoprolol ER 50 mg daily  - Amlodipine 10 mg daily    MED REC REQUIRED  Post Medication Reconciliation Status:  Discharge medications reconciled, continue medications without change    I spent a total of 32 minutes on the day of the visit.   Time spent by me today doing chart review, history and exam, documentation and further activities per the note    The longitudinal plan of care for the diagnosis(es)/condition(s) as documented were addressed during this visit. Due to the added complexity in care, I will continue to support Jamie Chu in the subsequent management and with ongoing continuity of care.    Follow-up about  3 months..    Lorri Ayala is a 74 year old, presenting for the following health issues:  Hospital F/U        3/10/2025    10:48 AM   Additional Questions   Roomed by RANDY Mccann CMA   Accompanied by Self         3/10/2025    10:48 AM   Patient Reported Additional Medications   Patient reports taking the following new medications None     HPI        Hospital Follow-up Visit:  Hospital/Nursing Home/IP Rehab Facility: [facility]  Date of Admission: 2/26/2025  Date of Discharge: 3/5/2025  Reason(s) for Admission:   Hypoxia  COPD exacerbation (H)  Pneumonia of both lower lobes due to infectious organism  Was the patient in the ICU or did the patient experience delirium during hospitalization?  No  Do you have any other stressors you would like to discuss with your provider? No    Problems taking medications regularly:  None  Medication changes since discharge: None  Problems adhering to non-medication therapy:  None    Summary of hospitalization:  [facility] discharge summary reviewed  Diagnostic Tests/Treatments reviewed.  Follow up needed: none  Other Healthcare Providers Involved in Patient s Care:         None  Update since discharge: improved.     -Doing much better  -Basically feels back to normal  -Use supplemental oxygen for first couple days after discharge but none since  -Diligent about SpO2 monitoring  -Seems to always be hovering in the low to mid 90 percentile  -Really no ongoing cough, no wheezing    Plan of care communicated with patient             Review of Systems  Constitutional, HEENT, cardiovascular, pulmonary, gi and gu systems are negative, except as otherwise noted.      Objective    /70   Pulse 100   Temp (!) 96  F (35.6  C) (Tympanic)   Resp 16   Wt 69.9 kg (154 lb)   SpO2 94%   BMI 20.89 kg/m    Body mass index is 20.89 kg/m .  Physical Exam  Vitals reviewed.   Constitutional:       Appearance: Normal appearance.   HENT:      Head: Normocephalic  and atraumatic.   Cardiovascular:      Rate and Rhythm: Normal rate and regular rhythm.      Heart sounds: No murmur heard.  Pulmonary:      Breath sounds: No stridor. No wheezing, rhonchi or rales.      Comments: Globally decreased air movement but clear throughout all fields.  Musculoskeletal:         General: Normal range of motion.   Skin:     General: Skin is warm and dry.   Neurological:      General: No focal deficit present.      Mental Status: He is alert and oriented to person, place, and time.   Psychiatric:         Mood and Affect: Mood normal.         Behavior: Behavior normal.         Signed Electronically by: Jaime Hebert MD

## 2025-03-10 ENCOUNTER — OFFICE VISIT (OUTPATIENT)
Dept: FAMILY MEDICINE | Facility: OTHER | Age: 75
End: 2025-03-10
Attending: STUDENT IN AN ORGANIZED HEALTH CARE EDUCATION/TRAINING PROGRAM
Payer: COMMERCIAL

## 2025-03-10 VITALS
OXYGEN SATURATION: 94 % | TEMPERATURE: 96 F | RESPIRATION RATE: 16 BRPM | SYSTOLIC BLOOD PRESSURE: 134 MMHG | DIASTOLIC BLOOD PRESSURE: 70 MMHG | HEART RATE: 100 BPM | WEIGHT: 154 LBS | BODY MASS INDEX: 20.89 KG/M2

## 2025-03-10 DIAGNOSIS — I10 ESSENTIAL (PRIMARY) HYPERTENSION: ICD-10-CM

## 2025-03-10 DIAGNOSIS — E11.9 TYPE 2 DIABETES MELLITUS WITHOUT COMPLICATION, WITHOUT LONG-TERM CURRENT USE OF INSULIN (H): ICD-10-CM

## 2025-03-10 DIAGNOSIS — J18.9 PNEUMONIA OF BOTH LOWER LOBES DUE TO INFECTIOUS ORGANISM: ICD-10-CM

## 2025-03-10 DIAGNOSIS — J44.1 COPD EXACERBATION (H): ICD-10-CM

## 2025-03-10 DIAGNOSIS — Z09 HOSPITAL DISCHARGE FOLLOW-UP: Primary | ICD-10-CM

## 2025-03-10 DIAGNOSIS — I25.10 CORONARY ARTERY DISEASE INVOLVING NATIVE CORONARY ARTERY OF NATIVE HEART WITHOUT ANGINA PECTORIS: ICD-10-CM

## 2025-03-10 PROBLEM — R00.1 BRADYCARDIA: Status: ACTIVE | Noted: 2025-01-08

## 2025-03-10 LAB
ANION GAP SERPL CALCULATED.3IONS-SCNC: 9 MMOL/L (ref 7–15)
BASOPHILS # BLD AUTO: 0 10E3/UL (ref 0–0.2)
BASOPHILS NFR BLD AUTO: 0 %
BUN SERPL-MCNC: 20 MG/DL (ref 8–23)
CALCIUM SERPL-MCNC: 8.8 MG/DL (ref 8.8–10.4)
CHLORIDE SERPL-SCNC: 98 MMOL/L (ref 98–107)
CREAT SERPL-MCNC: 1.21 MG/DL (ref 0.67–1.17)
EGFRCR SERPLBLD CKD-EPI 2021: 63 ML/MIN/1.73M2
EOSINOPHIL # BLD AUTO: 0.1 10E3/UL (ref 0–0.7)
EOSINOPHIL NFR BLD AUTO: 0 %
ERYTHROCYTE [DISTWIDTH] IN BLOOD BY AUTOMATED COUNT: 14.1 % (ref 10–15)
EST. AVERAGE GLUCOSE BLD GHB EST-MCNC: 177 MG/DL
GLUCOSE SERPL-MCNC: 276 MG/DL (ref 70–99)
HBA1C MFR BLD: 7.8 %
HCO3 SERPL-SCNC: 27 MMOL/L (ref 22–29)
HCT VFR BLD AUTO: 44.2 % (ref 40–53)
HGB BLD-MCNC: 14.9 G/DL (ref 13.3–17.7)
IMM GRANULOCYTES # BLD: 0.4 10E3/UL
IMM GRANULOCYTES NFR BLD: 2 %
LYMPHOCYTES # BLD AUTO: 0.9 10E3/UL (ref 0.8–5.3)
LYMPHOCYTES NFR BLD AUTO: 5 %
MCH RBC QN AUTO: 30.1 PG (ref 26.5–33)
MCHC RBC AUTO-ENTMCNC: 33.7 G/DL (ref 31.5–36.5)
MCV RBC AUTO: 89 FL (ref 78–100)
MONOCYTES # BLD AUTO: 1.5 10E3/UL (ref 0–1.3)
MONOCYTES NFR BLD AUTO: 8 %
NEUTROPHILS # BLD AUTO: 16.7 10E3/UL (ref 1.6–8.3)
NEUTROPHILS NFR BLD AUTO: 85 %
NRBC # BLD AUTO: 0 10E3/UL
NRBC BLD AUTO-RTO: 0 /100
PLATELET # BLD AUTO: 412 10E3/UL (ref 150–450)
POTASSIUM SERPL-SCNC: 4.9 MMOL/L (ref 3.4–5.3)
RBC # BLD AUTO: 4.95 10E6/UL (ref 4.4–5.9)
SODIUM SERPL-SCNC: 134 MMOL/L (ref 135–145)
WBC # BLD AUTO: 19.5 10E3/UL (ref 4–11)

## 2025-03-10 PROCEDURE — G0463 HOSPITAL OUTPT CLINIC VISIT: HCPCS

## 2025-03-10 PROCEDURE — 83036 HEMOGLOBIN GLYCOSYLATED A1C: CPT | Mod: ZL | Performed by: STUDENT IN AN ORGANIZED HEALTH CARE EDUCATION/TRAINING PROGRAM

## 2025-03-10 PROCEDURE — 82310 ASSAY OF CALCIUM: CPT | Mod: ZL | Performed by: STUDENT IN AN ORGANIZED HEALTH CARE EDUCATION/TRAINING PROGRAM

## 2025-03-10 PROCEDURE — 85004 AUTOMATED DIFF WBC COUNT: CPT | Mod: ZL | Performed by: STUDENT IN AN ORGANIZED HEALTH CARE EDUCATION/TRAINING PROGRAM

## 2025-03-10 PROCEDURE — 36415 COLL VENOUS BLD VENIPUNCTURE: CPT | Mod: ZL | Performed by: STUDENT IN AN ORGANIZED HEALTH CARE EDUCATION/TRAINING PROGRAM

## 2025-03-10 PROCEDURE — 80048 BASIC METABOLIC PNL TOTAL CA: CPT | Mod: ZL | Performed by: STUDENT IN AN ORGANIZED HEALTH CARE EDUCATION/TRAINING PROGRAM

## 2025-03-10 ASSESSMENT — PAIN SCALES - GENERAL: PAINLEVEL_OUTOF10: NO PAIN (0)

## 2025-03-17 DIAGNOSIS — E11.9 TYPE 2 DIABETES MELLITUS WITHOUT COMPLICATION, WITHOUT LONG-TERM CURRENT USE OF INSULIN (H): Primary | ICD-10-CM

## 2025-03-17 RX ORDER — METFORMIN HYDROCHLORIDE 500 MG/1
500 TABLET, EXTENDED RELEASE ORAL 2 TIMES DAILY WITH MEALS
Qty: 180 TABLET | Refills: 1 | Status: SHIPPED | OUTPATIENT
Start: 2025-03-17

## 2025-03-21 ENCOUNTER — TRANSFERRED RECORDS (OUTPATIENT)
Dept: HEALTH INFORMATION MANAGEMENT | Facility: CLINIC | Age: 75
End: 2025-03-21

## 2025-03-21 LAB — RETINOPATHY: POSITIVE

## 2025-07-06 SDOH — HEALTH STABILITY: PHYSICAL HEALTH: ON AVERAGE, HOW MANY DAYS PER WEEK DO YOU ENGAGE IN MODERATE TO STRENUOUS EXERCISE (LIKE A BRISK WALK)?: 4 DAYS

## 2025-07-06 SDOH — HEALTH STABILITY: PHYSICAL HEALTH: ON AVERAGE, HOW MANY MINUTES DO YOU ENGAGE IN EXERCISE AT THIS LEVEL?: 40 MIN

## 2025-07-06 ASSESSMENT — SOCIAL DETERMINANTS OF HEALTH (SDOH): HOW OFTEN DO YOU GET TOGETHER WITH FRIENDS OR RELATIVES?: MORE THAN THREE TIMES A WEEK

## 2025-07-07 ENCOUNTER — LAB (OUTPATIENT)
Dept: LAB | Facility: OTHER | Age: 75
End: 2025-07-07
Payer: COMMERCIAL

## 2025-07-07 DIAGNOSIS — E78.5 DYSLIPIDEMIA: ICD-10-CM

## 2025-07-07 DIAGNOSIS — E11.9 TYPE 2 DIABETES MELLITUS WITHOUT COMPLICATION, WITHOUT LONG-TERM CURRENT USE OF INSULIN (H): ICD-10-CM

## 2025-07-07 DIAGNOSIS — Z00.00 ENCOUNTER FOR MEDICARE ANNUAL WELLNESS EXAM: ICD-10-CM

## 2025-07-07 DIAGNOSIS — Z12.5 SCREENING FOR MALIGNANT NEOPLASM OF PROSTATE: ICD-10-CM

## 2025-07-07 DIAGNOSIS — I10 ESSENTIAL (PRIMARY) HYPERTENSION: ICD-10-CM

## 2025-07-07 LAB
HOLD SPECIMEN: NORMAL
HOLD SPECIMEN: NORMAL

## 2025-07-07 PROCEDURE — 3044F HG A1C LEVEL LT 7.0%: CPT

## 2025-07-07 PROCEDURE — 82310 ASSAY OF CALCIUM: CPT | Mod: ZL

## 2025-07-07 PROCEDURE — 83036 HEMOGLOBIN GLYCOSYLATED A1C: CPT | Mod: ZL

## 2025-07-07 PROCEDURE — 82465 ASSAY BLD/SERUM CHOLESTEROL: CPT | Mod: ZL

## 2025-07-07 PROCEDURE — G0103 PSA SCREENING: HCPCS | Mod: ZL

## 2025-07-07 PROCEDURE — 3048F LDL-C <100 MG/DL: CPT

## 2025-07-07 PROCEDURE — 36415 COLL VENOUS BLD VENIPUNCTURE: CPT | Mod: ZL

## 2025-07-08 ENCOUNTER — OFFICE VISIT (OUTPATIENT)
Dept: FAMILY MEDICINE | Facility: OTHER | Age: 75
End: 2025-07-08
Attending: STUDENT IN AN ORGANIZED HEALTH CARE EDUCATION/TRAINING PROGRAM
Payer: COMMERCIAL

## 2025-07-08 VITALS
HEART RATE: 87 BPM | WEIGHT: 165.4 LBS | HEIGHT: 71 IN | SYSTOLIC BLOOD PRESSURE: 138 MMHG | TEMPERATURE: 97.1 F | OXYGEN SATURATION: 96 % | DIASTOLIC BLOOD PRESSURE: 66 MMHG | BODY MASS INDEX: 23.15 KG/M2 | RESPIRATION RATE: 16 BRPM

## 2025-07-08 DIAGNOSIS — Z12.11 SCREENING FOR MALIGNANT NEOPLASM OF COLON: ICD-10-CM

## 2025-07-08 DIAGNOSIS — Z12.5 SCREENING FOR MALIGNANT NEOPLASM OF PROSTATE: ICD-10-CM

## 2025-07-08 DIAGNOSIS — I10 ESSENTIAL (PRIMARY) HYPERTENSION: ICD-10-CM

## 2025-07-08 DIAGNOSIS — I25.10 CORONARY ARTERY DISEASE INVOLVING NATIVE CORONARY ARTERY OF NATIVE HEART WITHOUT ANGINA PECTORIS: ICD-10-CM

## 2025-07-08 DIAGNOSIS — J44.9 CHRONIC OBSTRUCTIVE PULMONARY DISEASE, UNSPECIFIED COPD TYPE (H): ICD-10-CM

## 2025-07-08 DIAGNOSIS — L40.9 PSORIASIS: ICD-10-CM

## 2025-07-08 DIAGNOSIS — Z00.00 ENCOUNTER FOR MEDICARE ANNUAL WELLNESS EXAM: Primary | ICD-10-CM

## 2025-07-08 DIAGNOSIS — Z53.20 COLON CANCER SCREENING DECLINED: ICD-10-CM

## 2025-07-08 DIAGNOSIS — E78.5 DYSLIPIDEMIA: ICD-10-CM

## 2025-07-08 DIAGNOSIS — E11.9 TYPE 2 DIABETES MELLITUS WITHOUT COMPLICATION, WITHOUT LONG-TERM CURRENT USE OF INSULIN (H): ICD-10-CM

## 2025-07-08 DIAGNOSIS — Z00.00 HEALTHCARE MAINTENANCE: ICD-10-CM

## 2025-07-08 LAB
ALBUMIN SERPL BCG-MCNC: 4.5 G/DL (ref 3.5–5.2)
ALP SERPL-CCNC: 101 U/L (ref 40–150)
ALT SERPL W P-5'-P-CCNC: 14 U/L (ref 0–70)
ANION GAP SERPL CALCULATED.3IONS-SCNC: 13 MMOL/L (ref 7–15)
AST SERPL W P-5'-P-CCNC: 20 U/L (ref 0–45)
BILIRUB SERPL-MCNC: 0.6 MG/DL
BUN SERPL-MCNC: 17.5 MG/DL (ref 8–23)
CALCIUM SERPL-MCNC: 9.5 MG/DL (ref 8.8–10.4)
CHLORIDE SERPL-SCNC: 104 MMOL/L (ref 98–107)
CHOLEST SERPL-MCNC: 114 MG/DL
CREAT SERPL-MCNC: 1.06 MG/DL (ref 0.67–1.17)
EGFRCR SERPLBLD CKD-EPI 2021: 74 ML/MIN/1.73M2
EST. AVERAGE GLUCOSE BLD GHB EST-MCNC: 126 MG/DL
GLUCOSE SERPL-MCNC: 92 MG/DL (ref 70–99)
HBA1C MFR BLD: 6 %
HCO3 SERPL-SCNC: 25 MMOL/L (ref 22–29)
HDLC SERPL-MCNC: 50 MG/DL
LDLC SERPL CALC-MCNC: 45 MG/DL
NONHDLC SERPL-MCNC: 64 MG/DL
POTASSIUM SERPL-SCNC: 4.7 MMOL/L (ref 3.4–5.3)
PROT SERPL-MCNC: 6.7 G/DL (ref 6.4–8.3)
PSA SERPL DL<=0.01 NG/ML-MCNC: 0.68 NG/ML (ref 0–6.5)
SODIUM SERPL-SCNC: 142 MMOL/L (ref 135–145)
TRIGL SERPL-MCNC: 97 MG/DL

## 2025-07-08 PROCEDURE — G0463 HOSPITAL OUTPT CLINIC VISIT: HCPCS

## 2025-07-08 RX ORDER — METFORMIN HYDROCHLORIDE 500 MG/1
1000 TABLET, EXTENDED RELEASE ORAL 2 TIMES DAILY WITH MEALS
Qty: 360 TABLET | Refills: 3 | Status: SHIPPED | OUTPATIENT
Start: 2025-07-08

## 2025-07-08 ASSESSMENT — PAIN SCALES - GENERAL: PAINLEVEL_OUTOF10: NO PAIN (0)

## 2025-07-08 NOTE — PROGRESS NOTES
Preventive Care Visit  [facility]  Jaime Hebert MD, Family Medicine  Jul 8, 2025      Assessment & Plan     Encounter for Medicare annual wellness exam  - CBC with platelets and differential; Future  - Comprehensive metabolic panel (BMP + Alb, Alk Phos, ALT, AST, Total. Bili, TP); Future  - Hemoglobin A1c; Future  - Lipid Profile (Chol, Trig, HDL, LDL calc); Future  - Albumin Random Urine Quantitative with Creat Ratio; Future  - PSA, screen; Future    Healthcare maintenance  - BP/vitals: Reviewed  - BMI: Body mass index is 23.4 kg/m .; discussed healthy diet and excise recommendations  - ASCVD risk screening: Annual A1c/lipid screen.  Discussed risk mitigation including indications for ASA/statin therapy.   The ASCVD Risk score (Michael SANCHEZ, et al., 2019) failed to calculate for the following reasons:    Risk score cannot be calculated because patient has a medical history suggesting prior/existing ASCVD  - Mood: Denies concerns.      7/8/2025     2:16 PM 3/9/2025    12:45 PM   PHQ-2 ( 1999 [employer])   Q1: Little interest or pleasure in doing things 0 1   Q2: Feeling down, depressed or hopeless 0 0   PHQ-2 Score 0 1    Q1: Little interest or pleasure in doing things  Several days   Q2: Feeling down, depressed or hopeless  Not at all   PHQ-2 Score  1       Patient-reported   - Tobacco/substance screening: No ongoing tobacco use     Tobacco Use      Smoking status: Former        Packs/day: 0.00        Years: 0.5 packs/day for 57.2 years (28.6 ttl pk-yrs)        Types: Cigarettes        Start date: 1/1/1965        Quit date: 4/1/2022        Years since quitting: 3.2        Passive exposure: Past      Smokeless tobacco: Never  - Infectious disease screening: Low risk; screening up-to-date  - Cancer screening:              -Family history:   -Lung: Chest CT 2/26/2025; 1 year follow-up   -Colon: No prior CRC screening   -Prostate: Annual PSA today  - Immunizations: Due for pneumonia, tetanus, zoster, RSV;  declined    Type 2 diabetes mellitus without complication, without long-term current use of insulin (H)  A1c down from 7.8 to 6.0, now at goal and doing fantastic.  No changes, keep up the great work.  - Hemoglobin A1c; Future  - A1c/statin  - Metformin 1000 mg twice daily  - Recommend annual diabetic eye exam  - Non-smoker    Dyslipidemia  Well-controlled on statin therapy.  - Lipid Profile (Chol, Trig, HDL, LDL calc); Future  - Lipitor 20 mg daily    Essential (primary) hypertension  Normotensive, asymptomatic.  - CBC with platelets and differential; Future  - Comprehensive metabolic panel (BMP + Alb, Alk Phos, ALT, AST, Total. Bili, TP); Future  - Amlodipine 10 mg daily  - Metoprolol ER 50 mg daily    Coronary artery disease involving native coronary artery of native heart without angina pectoris  Stable.  Continue medication and lifestyle optimization.  Follows with [facility] cardiology.  - ASA  - Lipitor 20 mg  - Metoprolol ER 50 mg  - Amlodipine 10 mg    Chronic obstructive pulmonary disease, unspecified COPD type (H)  Stable lately, no concerns.  Exacerbation secondary to pneumonia in February but has long been back to baseline.  SpO2 96 today.  - Currently only on DuoNebs as needed  - Low threshold for new PFTs  - Ongoing consideration for maintenance regimen but right now doing very well    Screening for malignant neoplasm of colon  Colon cancer screening declined  Discussed once again this year.  No prior CRC screening and no interest at this time.  Aware of risks of undiagnosed cancer.    Screening for malignant neoplasm of prostate  - PSA, screen; Future    Psoriasis  Severe plaque psoriasis bilateral palms of hands, soles of feet.  High potency topical steroids helped some but disease persistently severe.  Discussed possibility of some more advanced supportive options and would like to see outside dermatology.  Referred to [facility]; appreciate assistance.  - Adult Dermatology   Referral; Future    Patient has been advised of split billing requirements and indicates understanding: Yes    Counseling  Appropriate preventive services were addressed with this patient via screening, questionnaire, or discussion as appropriate for fall prevention, nutrition, physical activity, Tobacco-use cessation, social engagement, weight loss and cognition.  Checklist reviewing preventive services available has been given to the patient.  Reviewed patient's diet, addressing concerns and/or questions.   The patient was instructed to see the dentist every 6 months.   The patient was provided with written information regarding signs of hearing loss.   Reviewed preventive health counseling, as reflected in patient instructions    The longitudinal plan of care for the diagnosis(es)/condition(s) as documented were addressed during this visit. Due to the added complexity in care, I will continue to support Jamie in the subsequent management and with ongoing continuity of care.    Follow-up diabetes/med check 6 months.  Follow-up 1 year annual physical.    Subjective   Jamie is a 74 year old, presenting for the following:  Hypertension, Diabetes, Lipids, and Physical        7/8/2025     2:07 PM   Additional Questions   Roomed by Gina wilson   Accompanied by Self         7/8/2025     2:07 PM   Patient Reported Additional Medications   Patient reports taking the following new medications None          HPI    Diabetes Follow-up  How often are you checking your blood sugar? Not at all  What concerns do you have today about your diabetes? None   Do you have any of these symptoms? (Select all that apply)  Numbness in feet, Burning in feet, Redness, sores, or blisters on feet, and Blurry vision          Hyperlipidemia Follow-Up  Are you regularly taking any medication or supplement to lower your cholesterol?   Yes- Lipitor 20 mg  Are you having muscle aches or other side effects that you think could be caused by your  cholesterol lowering medication?  Yes- muscle aches in shoulder    Hypertension Follow-up  Do you check your blood pressure regularly outside of the clinic? Yes   Are you following a low salt diet? Yes  Are your blood pressures ever more than 140 on the top number (systolic) OR more   than 90 on the bottom number (diastolic), for example 140/90? Yes     BP Readings from Last 2 Encounters:   07/08/25 138/66   03/10/25 134/70     Hemoglobin A1C (%)   Date Value   07/07/2025 6.0 (H)   03/10/2025 7.8 (H)     LDL Cholesterol Calculated (mg/dL)   Date Value   07/07/2025 45   06/14/2024 46     Advance Care Planning    Discussed advance care planning with patient; however, patient declined at this time.        7/6/2025   General Health   How would you rate your overall physical health? Good   Feel stress (tense, anxious, or unable to sleep) Not at all         7/6/2025   Nutrition   Diet: Regular (no restrictions)         7/6/2025   Exercise   Days per week of moderate/strenous exercise 4 days   Average minutes spent exercising at this level 40 min         7/6/2025   Social Factors   Frequency of gathering with friends or relatives More than three times a week   Worry food won't last until get money to buy more No   Food not last or not have enough money for food? No   Do you have housing? (Housing is defined as stable permanent housing and does not include staying outside in a car, in a tent, in an abandoned building, in an overnight shelter, or couch-surfing.) Yes   Are you worried about losing your housing? No   Lack of transportation? No   Unable to get utilities (heat,electricity)? No         7/6/2025   Fall Risk   Fallen 2 or more times in the past year? No   Trouble with walking or balance? No          7/6/2025   Activities of Daily Living- Home Safety   Needs help with the following daily activites None of the above   Safety concerns in the home None of the above         7/6/2025   Dental   Dentist two times every  year? (!) NO         2025   Hearing Screening   Hearing concerns? (!) I NEED TO ASK PEOPLE TO SPEAK UP OR REPEAT THEMSELVES.    (!) IT'S HARD TO FOLLOW A CONVERSATION IN A NOISY RESTAURANT OR CROWDED ROOM.   Would you like a referral for hearing testing? No       Multiple values from one day are sorted in reverse-chronological order         2025   Driving Risk Screening   Patient/family members have concerns about driving No         2025   General Alertness/Fatigue Screening   Have you been more tired than usual lately? No         2025   Urinary Incontinence Screening   Bothered by leaking urine in past 6 months No           Today's PHQ-2 Score:       2025     2:16 PM   PHQ-2 (  [employer])   Q1: Little interest or pleasure in doing things 0   Q2: Feeling down, depressed or hopeless 0   PHQ-2 Score 0         2025   Substance Use   Alcohol more than 3/day or more than 7/wk No   Do you have a current opioid prescription? No   How severe/bad is pain from 1 to 10? /10   Do you use any other substances recreationally? No     Social History     Tobacco Use    Smoking status: Former     Current packs/day: 0.00     Average packs/day: 0.5 packs/day for 57.2 years (28.6 ttl pk-yrs)     Types: Cigarettes     Start date: 1965     Quit date: 2022     Years since quitting: 3.2     Passive exposure: Past    Smokeless tobacco: Never   Vaping Use    Vaping status: Never Used   Substance Use Topics    Alcohol use: Not Currently    Drug use: Never       ASCVD Risk   The ASCVD Risk score (Michael SANCHEZ, et al., 2019) failed to calculate for the following reasons:    Risk score cannot be calculated because patient has a medical history suggesting prior/existing ASCVD    Fracture Risk Assessment Tool  Link to Frax Calculator  Use the information below to complete the Frax calculator  : 1950  Sex: male  Weight (kg): 75 kg (actual weight)  Height (cm): 179.1 cm  Previous Fragility Fracture:   No  History of parent with fractured hip:  No  Current Smoking:  No  Patient has been on glucocorticoids for more than 3 months (5mg/day or more): No  Rheumatoid Arthritis on Problem List:  No  Secondary Osteoporosis on Problem List:  No  Consumes 3 or more units of alcohol per day: No  Femoral Neck BMD (g/cm2)            Reviewed and updated as needed this visit by Provider                    Lab work is in process  Labs reviewed in EPIC  Current providers sharing in care for this patient include:  Patient Care Team:  Jaime Hebert MD as PCP - General (Family Medicine)  Jaime Hebert MD as Assigned PCP  Marilyn Alfaro [facility] as Pharmacist (Pharmacist)  ANISH Delacruz MD as Assigned Dermatology Provider    The following health maintenance items are reviewed in Epic and correct as of today:  Health Maintenance   Topic Date Due    HF ACTION PLAN  Never done    MICROALBUMIN  Never done    SPIROMETRY  Never done    COLORECTAL CANCER SCREENING  Never done    PNEUMOCOCCAL VACCINE 50+ YEARS (1 of 2 - PCV) Never done    DTAP/TDAP/TD VACCINE (1 - Tdap) Never done    ZOSTER VACCINE (1 of 2) Never done    RSV VACCINE (1 - Risk 60-74 years 1-dose series) Never done    COVID-19 VACCINE (8 - 2024-25 season) 04/14/2025    INFLUENZA VACCINE (1) 09/01/2025    A1C  01/07/2026    BMP  01/07/2026    LUNG CANCER SCREENING  02/26/2026    CBC  03/10/2026    EYE EXAM  03/21/2026    ALT  07/07/2026    LIPID  07/07/2026    MEDICARE ANNUAL WELLNESS VISIT  07/08/2026    DIABETIC FOOT EXAM  07/08/2026    FALL RISK ASSESSMENT  07/08/2026    ADVANCE CARE PLANNING  06/14/2029    TSH W/FREE T4 REFLEX  Completed    HEPATITIS C SCREENING  Completed    COPD ACTION PLAN  Completed    PHQ-2 (once per calendar year)  Completed    AORTIC ANEURYSM SCREENING (SYSTEM ASSIGNED)  Completed    HPV VACCINE  Aged Out    MENINGITIS VACCINE  Aged Out         Review of Systems  Constitutional, HEENT, cardiovascular, pulmonary, gi and gu systems are  "negative, except as otherwise noted.     Objective    Exam  /66 (BP Location: Left arm, Patient Position: Sitting, Cuff Size: Adult Regular)   Pulse 87   Temp 97.1  F (36.2  C) (Tympanic)   Resp 16   Ht 1.791 m (5' 10.5\")   Wt 75 kg (165 lb 6.4 oz)   SpO2 96%   BMI 23.40 kg/m     Estimated body mass index is 23.4 kg/m  as calculated from the following:    Height as of this encounter: 1.791 m (5' 10.5\").    Weight as of this encounter: 75 kg (165 lb 6.4 oz).    Physical Exam  Diabetic Foot Screen:  Any complaints of increased pain or numbness ? No  Is there a foot ulcer now or a history of foot ulcer? No  Does the foot have an abnormal shape? No  Are the nails thick, too long or ingrown? No  Are there any redness or open areas?  YES plantar plaque psoriasis         Sensation Testing done at all points on the diagram with monofilament     Right Foot: Sensation Normal at all points  Left Foot: Sensation Normal at all points     Risk Category: 1- Loss of protective sensation with normal appearing foot (no weakness, deformity, callous, pre-ulcer or h/o ulceration)  Performed by Jaime Hebert MD    GENERAL: alert and no distress  EYES: Eyes grossly normal to inspection, PERRL and conjunctivae and sclerae normal  HENT: ear canals and TM's normal, nose and mouth without ulcers or lesions  NECK: no adenopathy, no asymmetry, masses, or scars  RESP: lungs clear to auscultation - no rales, rhonchi or wheezes  CV: regular rate and rhythm, normal S1 S2, no S3 or S4, no murmur, click or rub, no peripheral edema  ABDOMEN: soft, nontender, no hepatosplenomegaly, no masses and bowel sounds normal  MS: no gross musculoskeletal defects noted, no edema  SKIN: no suspicious lesions or rashes  NEURO: Normal strength and tone, mentation intact and speech normal  PSYCH: mentation appears normal, affect normal/bright         7/8/2025   Mini Cog   Clock Draw Score 2 Normal   3 Item Recall 3 objects recalled   Mini Cog Total " Score 5              Signed Electronically by: Jaime Hebert MD

## 2025-07-08 NOTE — PATIENT INSTRUCTIONS
Patient Education   Preventive Care Advice   This is general advice given by our system to help you stay healthy. However, your care team may have specific advice just for you. Please talk to your care team about your preventive care needs.  Nutrition  Eat 5 or more servings of fruits and vegetables each day.  Try wheat bread, brown rice and whole grain pasta (instead of white bread, rice, and pasta).  Get enough calcium and vitamin D. Check the label on foods and aim for 100% of the RDA (recommended daily allowance).  Lifestyle  Exercise at least 150 minutes each week  (30 minutes a day, 5 days a week).  Do muscle strengthening activities 2 days a week. These help control your weight and prevent disease.  No smoking.  Wear sunscreen to prevent skin cancer.  Have a dental exam and cleaning every 6 months.  Yearly exams  See your health care team every year to talk about:  Any changes in your health.  Any medicines your care team has prescribed.  Preventive care, family planning, and ways to prevent chronic diseases.  Shots (vaccines)   HPV shots (up to age 26), if you've never had them before.  Hepatitis B shots (up to age 59), if you've never had them before.  COVID-19 shot: Get this shot when it's due.  Flu shot: Get a flu shot every year.  Tetanus shot: Get a tetanus shot every 10 years.  Pneumococcal, hepatitis A, and RSV shots: Ask your care team if you need these based on your risk.  Shingles shot (for age 50 and up)  General health tests  Diabetes screening:  Starting at age 35, Get screened for diabetes at least every 3 years.  If you are younger than age 35, ask your care team if you should be screened for diabetes.  Cholesterol test: At age 39, start having a cholesterol test every 5 years, or more often if advised.  Bone density scan (DEXA): At age 50, ask your care team if you should have this scan for osteoporosis (brittle bones).  Hepatitis C: Get tested at least once in your life.  STIs (sexually  transmitted infections)  Before age 24: Ask your care team if you should be screened for STIs.  After age 24: Get screened for STIs if you're at risk. You are at risk for STIs (including HIV) if:  You are sexually active with more than one person.  You don't use condoms every time.  You or a partner was diagnosed with a sexually transmitted infection.  If you are at risk for HIV, ask about PrEP medicine to prevent HIV.  Get tested for HIV at least once in your life, whether you are at risk for HIV or not.  Cancer screening tests  Cervical cancer screening: If you have a cervix, begin getting regular cervical cancer screening tests starting at age 21.  Breast cancer scan (mammogram): If you've ever had breasts, begin having regular mammograms starting at age 40. This is a scan to check for breast cancer.  Colon cancer screening: It is important to start screening for colon cancer at age 45.  Have a colonoscopy test every 10 years (or more often if you're at risk) Or, ask your provider about stool tests like a FIT test every year or Cologuard test every 3 years.  To learn more about your testing options, visit:   .  For help making a decision, visit:   https://bit.ly/rz25749.  Prostate cancer screening test: If you have a prostate, ask your care team if a prostate cancer screening test (PSA) at age 55 is right for you.  Lung cancer screening: If you are a current or former smoker ages 50 to 80, ask your care team if ongoing lung cancer screenings are right for you.  For informational purposes only. Not to replace the advice of your health care provider. Copyright   2023 [facility] [employer]. All rights reserved. Clinically reviewed by the [facility]itions [facility]. [place] [id number] - REV 01/24.  Hearing Loss: Care Instructions  Overview     Hearing loss is a sudden or slow decrease in how well you hear. It can range from slight to profound. Permanent hearing loss can occur with aging. It also can  happen when you are exposed long-term to loud noise. Examples include listening to loud music, riding motorcycles, or being around other loud machines.  Hearing loss can affect your work and home life. It can make you feel lonely or depressed. You may feel that you have lost your independence. But hearing aids and other devices can help you hear better and feel connected to others.  Follow-up care is a key part of your treatment and safety. Be sure to make and go to all appointments, and call your doctor if you are having problems. It's also a good idea to know your test results and keep a list of the medicines you take.  How can you care for yourself at home?  Avoid loud noises whenever possible. This helps keep your hearing from getting worse.  Always wear hearing protection around loud noises.  Wear a hearing aid as directed.  A professional can help you pick a hearing aid that will work best for you.  You can also get hearing aids over the counter for mild to moderate hearing loss.  Have hearing tests as your doctor suggests. They can show whether your hearing has changed. Your hearing aid may need to be adjusted.  Use other devices as needed. These may include:  Telephone amplifiers and hearing aids that can connect to a television, stereo, radio, or microphone.  Devices that use lights or vibrations. These alert you to the doorbell, a ringing telephone, or a baby monitor.  Television closed-captioning. This shows the words at the bottom of the screen. Most new TVs can do this.  TTY (text telephone). This lets you type messages back and forth on the telephone instead of talking or listening. These devices are also called TDD. When messages are typed on the keyboard, they are sent over the phone line to a receiving TTY. The message is shown on a monitor.  Use text messaging, social media, and email if it is hard for you to communicate by telephone.  Try to learn a listening technique called speechreading. It is  "not lipreading. You pay attention to people's gestures, expressions, posture, and tone of voice. These clues can help you understand what a person is saying. Face the person you are talking to, and have them face you. Make sure the lighting is good. You need to see the other person's face clearly.  Think about counseling if you need help to adjust to your hearing loss.  When should you call for help?  Watch closely for changes in your health, and be sure to contact your doctor if:    You think your hearing is getting worse.     You have new symptoms, such as dizziness or nausea.   Where can you learn more?  Go to https://www.[employer].net/patiented  Enter R798 in the search box to learn more about \"Hearing Loss: Care Instructions.\"  Current as of: October 27, 2024  Content Version: 14.5    0235-5343 [employer].   Care instructions adapted under license by your healthcare professional. If you have questions about a medical condition or this instruction, always ask your healthcare professional. [employer] disclaims any warranty or liability for your use of this information.       "

## (undated) RX ORDER — LIDOCAINE HCL/EPINEPHRINE/PF 2%-1:200K
VIAL (ML) INJECTION
Status: DISPENSED
Start: 2024-06-27